# Patient Record
Sex: FEMALE | Race: WHITE | NOT HISPANIC OR LATINO
[De-identification: names, ages, dates, MRNs, and addresses within clinical notes are randomized per-mention and may not be internally consistent; named-entity substitution may affect disease eponyms.]

---

## 2021-07-12 ENCOUNTER — APPOINTMENT (OUTPATIENT)
Dept: UROGYNECOLOGY | Facility: CLINIC | Age: 56
End: 2021-07-12

## 2022-11-17 ENCOUNTER — OUTPATIENT (OUTPATIENT)
Dept: OUTPATIENT SERVICES | Facility: HOSPITAL | Age: 57
LOS: 1 days | End: 2022-11-17
Payer: MEDICARE

## 2022-11-17 VITALS
OXYGEN SATURATION: 100 % | TEMPERATURE: 98 F | DIASTOLIC BLOOD PRESSURE: 67 MMHG | WEIGHT: 229.06 LBS | RESPIRATION RATE: 16 BRPM | HEIGHT: 64 IN | HEART RATE: 83 BPM | SYSTOLIC BLOOD PRESSURE: 125 MMHG

## 2022-11-17 DIAGNOSIS — Z29.9 ENCOUNTER FOR PROPHYLACTIC MEASURES, UNSPECIFIED: ICD-10-CM

## 2022-11-17 DIAGNOSIS — Z01.818 ENCOUNTER FOR OTHER PREPROCEDURAL EXAMINATION: ICD-10-CM

## 2022-11-17 DIAGNOSIS — M47.896 OTHER SPONDYLOSIS, LUMBAR REGION: ICD-10-CM

## 2022-11-17 DIAGNOSIS — Z98.890 OTHER SPECIFIED POSTPROCEDURAL STATES: Chronic | ICD-10-CM

## 2022-11-17 DIAGNOSIS — Z90.710 ACQUIRED ABSENCE OF BOTH CERVIX AND UTERUS: Chronic | ICD-10-CM

## 2022-11-17 DIAGNOSIS — Z96.652 PRESENCE OF LEFT ARTIFICIAL KNEE JOINT: Chronic | ICD-10-CM

## 2022-11-17 DIAGNOSIS — Z90.49 ACQUIRED ABSENCE OF OTHER SPECIFIED PARTS OF DIGESTIVE TRACT: Chronic | ICD-10-CM

## 2022-11-17 LAB
A1C WITH ESTIMATED AVERAGE GLUCOSE RESULT: 5.9 % — HIGH (ref 4–5.6)
ANION GAP SERPL CALC-SCNC: 3 MMOL/L — LOW (ref 5–17)
APPEARANCE UR: CLEAR — SIGNIFICANT CHANGE UP
APTT BLD: 28.9 SEC — SIGNIFICANT CHANGE UP (ref 27.5–35.5)
BASOPHILS # BLD AUTO: 0.03 K/UL — SIGNIFICANT CHANGE UP (ref 0–0.2)
BASOPHILS NFR BLD AUTO: 0.5 % — SIGNIFICANT CHANGE UP (ref 0–2)
BILIRUB UR-MCNC: NEGATIVE — SIGNIFICANT CHANGE UP
BUN SERPL-MCNC: 19 MG/DL — SIGNIFICANT CHANGE UP (ref 7–23)
CALCIUM SERPL-MCNC: 9.7 MG/DL — SIGNIFICANT CHANGE UP (ref 8.5–10.1)
CHLORIDE SERPL-SCNC: 105 MMOL/L — SIGNIFICANT CHANGE UP (ref 96–108)
CO2 SERPL-SCNC: 29 MMOL/L — SIGNIFICANT CHANGE UP (ref 22–31)
COLOR SPEC: YELLOW — SIGNIFICANT CHANGE UP
CREAT SERPL-MCNC: 0.47 MG/DL — LOW (ref 0.5–1.3)
DIFF PNL FLD: NEGATIVE — SIGNIFICANT CHANGE UP
EGFR: 110 ML/MIN/1.73M2 — SIGNIFICANT CHANGE UP
EOSINOPHIL # BLD AUTO: 0.3 K/UL — SIGNIFICANT CHANGE UP (ref 0–0.5)
EOSINOPHIL NFR BLD AUTO: 4.8 % — SIGNIFICANT CHANGE UP (ref 0–6)
ESTIMATED AVERAGE GLUCOSE: 123 MG/DL — HIGH (ref 68–114)
GLUCOSE SERPL-MCNC: 103 MG/DL — HIGH (ref 70–99)
GLUCOSE UR QL: NEGATIVE — SIGNIFICANT CHANGE UP
HCT VFR BLD CALC: 42.2 % — SIGNIFICANT CHANGE UP (ref 34.5–45)
HGB BLD-MCNC: 14 G/DL — SIGNIFICANT CHANGE UP (ref 11.5–15.5)
IMM GRANULOCYTES NFR BLD AUTO: 0.2 % — SIGNIFICANT CHANGE UP (ref 0–0.9)
INR BLD: 0.95 RATIO — SIGNIFICANT CHANGE UP (ref 0.88–1.16)
KETONES UR-MCNC: NEGATIVE — SIGNIFICANT CHANGE UP
LEUKOCYTE ESTERASE UR-ACNC: ABNORMAL
LYMPHOCYTES # BLD AUTO: 1.82 K/UL — SIGNIFICANT CHANGE UP (ref 1–3.3)
LYMPHOCYTES # BLD AUTO: 29 % — SIGNIFICANT CHANGE UP (ref 13–44)
MCHC RBC-ENTMCNC: 28.6 PG — SIGNIFICANT CHANGE UP (ref 27–34)
MCHC RBC-ENTMCNC: 33.2 GM/DL — SIGNIFICANT CHANGE UP (ref 32–36)
MCV RBC AUTO: 86.3 FL — SIGNIFICANT CHANGE UP (ref 80–100)
MONOCYTES # BLD AUTO: 0.64 K/UL — SIGNIFICANT CHANGE UP (ref 0–0.9)
MONOCYTES NFR BLD AUTO: 10.2 % — SIGNIFICANT CHANGE UP (ref 2–14)
MRSA PCR RESULT.: DETECTED
NEUTROPHILS # BLD AUTO: 3.48 K/UL — SIGNIFICANT CHANGE UP (ref 1.8–7.4)
NEUTROPHILS NFR BLD AUTO: 55.3 % — SIGNIFICANT CHANGE UP (ref 43–77)
NITRITE UR-MCNC: NEGATIVE — SIGNIFICANT CHANGE UP
PH UR: 5 — SIGNIFICANT CHANGE UP (ref 5–8)
PLATELET # BLD AUTO: 204 K/UL — SIGNIFICANT CHANGE UP (ref 150–400)
POTASSIUM SERPL-MCNC: 4.2 MMOL/L — SIGNIFICANT CHANGE UP (ref 3.5–5.3)
POTASSIUM SERPL-SCNC: 4.2 MMOL/L — SIGNIFICANT CHANGE UP (ref 3.5–5.3)
PROT UR-MCNC: NEGATIVE — SIGNIFICANT CHANGE UP
PROTHROM AB SERPL-ACNC: 11 SEC — SIGNIFICANT CHANGE UP (ref 10.5–13.4)
RBC # BLD: 4.89 M/UL — SIGNIFICANT CHANGE UP (ref 3.8–5.2)
RBC # FLD: 13.2 % — SIGNIFICANT CHANGE UP (ref 10.3–14.5)
S AUREUS DNA NOSE QL NAA+PROBE: DETECTED
SODIUM SERPL-SCNC: 137 MMOL/L — SIGNIFICANT CHANGE UP (ref 135–145)
SP GR SPEC: 1.02 — SIGNIFICANT CHANGE UP (ref 1.01–1.02)
UROBILINOGEN FLD QL: NEGATIVE — SIGNIFICANT CHANGE UP
WBC # BLD: 6.28 K/UL — SIGNIFICANT CHANGE UP (ref 3.8–10.5)
WBC # FLD AUTO: 6.28 K/UL — SIGNIFICANT CHANGE UP (ref 3.8–10.5)

## 2022-11-17 PROCEDURE — 87086 URINE CULTURE/COLONY COUNT: CPT

## 2022-11-17 PROCEDURE — 71046 X-RAY EXAM CHEST 2 VIEWS: CPT

## 2022-11-17 PROCEDURE — 87640 STAPH A DNA AMP PROBE: CPT

## 2022-11-17 PROCEDURE — 86901 BLOOD TYPING SEROLOGIC RH(D): CPT

## 2022-11-17 PROCEDURE — 71046 X-RAY EXAM CHEST 2 VIEWS: CPT | Mod: 26

## 2022-11-17 PROCEDURE — 80048 BASIC METABOLIC PNL TOTAL CA: CPT

## 2022-11-17 PROCEDURE — 85025 COMPLETE CBC W/AUTO DIFF WBC: CPT

## 2022-11-17 PROCEDURE — 99214 OFFICE O/P EST MOD 30 MIN: CPT | Mod: 25

## 2022-11-17 PROCEDURE — 85730 THROMBOPLASTIN TIME PARTIAL: CPT

## 2022-11-17 PROCEDURE — 87641 MR-STAPH DNA AMP PROBE: CPT

## 2022-11-17 PROCEDURE — 86900 BLOOD TYPING SEROLOGIC ABO: CPT

## 2022-11-17 PROCEDURE — 93005 ELECTROCARDIOGRAM TRACING: CPT

## 2022-11-17 PROCEDURE — 86850 RBC ANTIBODY SCREEN: CPT

## 2022-11-17 PROCEDURE — 93010 ELECTROCARDIOGRAM REPORT: CPT

## 2022-11-17 PROCEDURE — 81001 URINALYSIS AUTO W/SCOPE: CPT

## 2022-11-17 PROCEDURE — 36415 COLL VENOUS BLD VENIPUNCTURE: CPT

## 2022-11-17 PROCEDURE — 83036 HEMOGLOBIN GLYCOSYLATED A1C: CPT

## 2022-11-17 PROCEDURE — 85610 PROTHROMBIN TIME: CPT

## 2022-11-17 NOTE — H&P PST ADULT - NS PRO LAST MENSTRUAL DATE
Prior to injection verified pt identity using pt name and date of birth.    Allergy injection/s given and charted on paper allergy flow sheet.  Patient left AMA, signing form, not staying for the observation period.    Morgan Buchanan RN on 4/6/2022 at 11:22 AM        
Hysterectomy at 33 years old

## 2022-11-17 NOTE — H&P PST ADULT - HISTORY OF PRESENT ILLNESS
57 years old female with spinal stenosis and spondylolisthesis. Reports constant aching and at times sharp pain to mid lumbar spine radiating to bilateral feet. Numbness and tingling to bilateral legs. Reports pain for "a few years " which has gotten  worst in the last year.  Four falls since Summer 2020. Last fell 11/16/2020 with trauma to left palm of hand resulting in 12 sutures to site. Falls due to weakness in left lower extremity. Denies changes in bowel or urinary habits. Pain medications for pain. Planned bilateral laminectomy, fusion and instrumentation L2-5.

## 2022-11-17 NOTE — H&P PST ADULT - ALLERGIC/IMMUNOLOGIC
Reason for call:  Other   Patient called regarding (reason for call): appointment   Additional comments: extra time after physical     Phone number to reach patient:  Home number on file [phone] (home)    Best Time:  Any time    Can we leave a detailed message on this number?  YES    
S-(situation): I received a message from scheduling asking if pt needs more time for her physical this  Monday?  Pt intends to establish care with Dr Langley.    I spoke with pt.    She needs to establish care with primary care provider.  Also, she has history of endometrial cancer in 2007 and was treated with radiation.  Pt had a follow up visit in 2008 with Dr Melara in oncology.  She has not had any health care since.  Pt reports that she walks with a cane due to bilateral hip pain and that her legs are getting weaker.  She has bilateral leg swelling, presumed lymph drainage, ever since her surgery.    She complains of ongoing urinary incontinence.       B-(background): per above.    A-(assessment): upcoming appt to establish care.    R-(recommendations): Advised to arrive fasting for her 7:20 appt on Monday.  Appt is appropriately scheduled.  Orders and follow up recommendations will be determined at the visit.    Zenaida Biggs RN      
negative

## 2022-11-17 NOTE — H&P PST ADULT - NSICDXPASTSURGICALHX_GEN_ALL_CORE_FT
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 Replaced after 9 months    History of cholecystectomy 2018    History of hysterectomy at 33 years old    Status post creation of urethral sling by suprapubic approach at 33 years old

## 2022-11-17 NOTE — H&P PST ADULT - ASSESSMENT
57 years old female present to PST prior to bilateral laminectomies, fusion , instrumentation L2-5, bone graft, allograft, BNP with Dr. Schaefer.    Plan   1. NPO as per ASU  2. Take the following medications with sips of water on the day of procedure: Lisinopril, Amlodipine, Fluoxetine, may take Clonazepam  3. Use E-Z sponge as directed  4. Use Mupirocin as directed.  5. Drink a quart of extra  fluids the day before your surgery.  6 Medical optimization for surgery with Dr. Rosales and cardiac evaluation with Dr. Nation  7. CBC, CMP, PT/ INR and PTT, Urinalysis, Urine culture, Type and Screen, HGB AIC, MRSA sent to lab  8. EKG and Chest x- ray done in [facility]  9. Stop Meloxicam 7 days prior to surgery  10. To schedule covid swab    JOSEI SCORE [CLOT]    AGE RELATED RISK FACTORS                                                       MOBILITY RELATED FACTORS  [x ] Age 41-60 years                                            (1 Point)                  [ ] Bed rest                                                        (1 Point)  [ ] Age: 61-74 years                                           (2 Points)                 [ ] Plaster cast                                                   (2 Points)  [ ] Age= 75 years                                              (3 Points)                 [ ] Bed bound for more than 72 hours                 (2 Points)    DISEASE RELATED RISK FACTORS                                               GENDER SPECIFIC FACTORS  [x ] Edema in the lower extremities                       (1 Point)                  [ ] Pregnancy                                                     (1 Point)  [ ] Varicose veins                                               (1 Point)                  [ ] Post-partum < 6 weeks                                   (1 Point)             [x ] BMI > 25 Kg/m2                                            (1 Point)                  [ ] Hormonal therapy  or oral contraception          (1 Point)                 [ ] Sepsis (in the previous month)                        (1 Point)                  [ ] History of pregnancy complications                 (1 point)  [ ] Pneumonia or serious lung disease                                               [ ] Unexplained or recurrent                     (1 Point)           (in the previous month)                               (1 Point)  [ ] Abnormal pulmonary function test                     (1 Point)                 SURGERY RELATED RISK FACTORS  [ ] Acute myocardial infarction                              (1 Point)                 [ ]  Section                                             (1 Point)  [ ] Congestive heart failure (in the previous month)  (1 Point)               [ ] Minor surgery                                                  (1 Point)   [ ] Inflammatory bowel disease                             (1 Point)                 [ ] Arthroscopic surgery                                        (2 Points)  [ ] Central venous access                                      (2 Points)                [ x] General surgery lasting more than 45 minutes   (2 Points)       [ ] Stroke (in the previous month)                          (5 Points)               [ ] Elective arthroplasty                                         (5 Points)            [ ] malignancy present or previous                      (2 points)                                                                                                                                   HEMATOLOGY RELATED FACTORS                                                 TRAUMA RELATED RISK FACTORS  [ ] Prior episodes of VTE                                     (3 Points)                 [ ] Fracture of the hip, pelvis, or leg                       (5 Points)  [ ] Positive family history for VTE                         (3 Points)                 [ ] Acute spinal cord injury (in the previous month)  (5 Points)  [ ] Prothrombin 91312 A                                     (3 Points)                 [ ] Paralysis  (less than 1 month)                             (5 Points)  [ ] Factor V Leiden                                             (3 Points)                  [ ] Multiple Trauma within 1 month                        (5 Points)  [ ] Lupus anticoagulants                                     (3 Points)                                                           [ ] Anticardiolipin antibodies                               (3 Points)                                                       [ ] High homocysteine in the blood                      (3 Points)                                             [ ] Other congenital or acquired thrombophilia      (3 Points)                                                [ ] Heparin induced thrombocytopenia                  (3 Points)                                          Total Score [      5    ]

## 2022-11-17 NOTE — H&P PST ADULT - NSICDXPASTMEDICALHX_GEN_ALL_CORE_FT
PAST MEDICAL HISTORY:  2019 novel coronavirus disease (COVID-19) 1/ 2022    Anxiety and depression     Asthma     Eczema     HTN (hypertension)     Joint pain Knees. left knee replaced.    Lower back pain with radiating pain to bilatral lower extremities    Lumbar stenosis     Obesity     Osteoarthritis of both knees     Peripheral edema     Prolapse of female pelvic organs     Rheumatoid arthritis     Seasonal allergies     Spondylolisthesis Lumbar

## 2022-11-17 NOTE — H&P PST ADULT - NSICDXFAMILYHX_GEN_ALL_CORE_FT
FAMILY HISTORY:  Father  Still living? No  Family history of bone cancer, Age at diagnosis: Age Unknown  Family history of osteoarthritis, Age at diagnosis: Age Unknown  FHx: diabetes mellitus, Age at diagnosis: Age Unknown    Mother  Still living? No  Family history of dementia, Age at diagnosis: Age Unknown

## 2022-11-18 DIAGNOSIS — M47.896 OTHER SPONDYLOSIS, LUMBAR REGION: ICD-10-CM

## 2022-11-18 DIAGNOSIS — Z01.818 ENCOUNTER FOR OTHER PREPROCEDURAL EXAMINATION: ICD-10-CM

## 2022-11-18 LAB
CULTURE RESULTS: NO GROWTH — SIGNIFICANT CHANGE UP
SPECIMEN SOURCE: SIGNIFICANT CHANGE UP

## 2022-12-01 RX ORDER — TRANEXAMIC ACID 100 MG/ML
1000 INJECTION, SOLUTION INTRAVENOUS ONCE
Refills: 0 | Status: DISCONTINUED | OUTPATIENT
Start: 2022-12-02 | End: 2022-12-07

## 2022-12-01 RX ORDER — TRANEXAMIC ACID 100 MG/ML
1 INJECTION, SOLUTION INTRAVENOUS
Qty: 1000 | Refills: 0 | Status: DISCONTINUED | OUTPATIENT
Start: 2022-12-02 | End: 2022-12-07

## 2022-12-02 ENCOUNTER — INPATIENT (INPATIENT)
Facility: HOSPITAL | Age: 57
LOS: 4 days | Discharge: HOME | DRG: 460 | End: 2022-12-07
Attending: ORTHOPAEDIC SURGERY | Admitting: ORTHOPAEDIC SURGERY
Payer: MEDICARE

## 2022-12-02 VITALS
SYSTOLIC BLOOD PRESSURE: 118 MMHG | RESPIRATION RATE: 16 BRPM | DIASTOLIC BLOOD PRESSURE: 79 MMHG | WEIGHT: 229.28 LBS | TEMPERATURE: 98 F | HEIGHT: 64 IN | HEART RATE: 80 BPM | OXYGEN SATURATION: 100 %

## 2022-12-02 DIAGNOSIS — Z90.710 ACQUIRED ABSENCE OF BOTH CERVIX AND UTERUS: Chronic | ICD-10-CM

## 2022-12-02 DIAGNOSIS — Z98.890 OTHER SPECIFIED POSTPROCEDURAL STATES: Chronic | ICD-10-CM

## 2022-12-02 DIAGNOSIS — Z90.49 ACQUIRED ABSENCE OF OTHER SPECIFIED PARTS OF DIGESTIVE TRACT: Chronic | ICD-10-CM

## 2022-12-02 DIAGNOSIS — M47.896 OTHER SPONDYLOSIS, LUMBAR REGION: ICD-10-CM

## 2022-12-02 DIAGNOSIS — Z96.652 PRESENCE OF LEFT ARTIFICIAL KNEE JOINT: Chronic | ICD-10-CM

## 2022-12-02 PROCEDURE — 87040 BLOOD CULTURE FOR BACTERIA: CPT

## 2022-12-02 PROCEDURE — C1889: CPT

## 2022-12-02 PROCEDURE — 97116 GAIT TRAINING THERAPY: CPT | Mod: GP

## 2022-12-02 PROCEDURE — 36415 COLL VENOUS BLD VENIPUNCTURE: CPT

## 2022-12-02 PROCEDURE — 97163 PT EVAL HIGH COMPLEX 45 MIN: CPT | Mod: GP

## 2022-12-02 PROCEDURE — 71045 X-RAY EXAM CHEST 1 VIEW: CPT

## 2022-12-02 PROCEDURE — C9399: CPT

## 2022-12-02 PROCEDURE — C1713: CPT

## 2022-12-02 PROCEDURE — 97530 THERAPEUTIC ACTIVITIES: CPT | Mod: GP

## 2022-12-02 PROCEDURE — 82962 GLUCOSE BLOOD TEST: CPT

## 2022-12-02 PROCEDURE — 76000 FLUOROSCOPY <1 HR PHYS/QHP: CPT

## 2022-12-02 PROCEDURE — 94640 AIRWAY INHALATION TREATMENT: CPT

## 2022-12-02 PROCEDURE — 80048 BASIC METABOLIC PNL TOTAL CA: CPT

## 2022-12-02 PROCEDURE — 85025 COMPLETE CBC W/AUTO DIFF WBC: CPT

## 2022-12-02 PROCEDURE — 81001 URINALYSIS AUTO W/SCOPE: CPT

## 2022-12-02 PROCEDURE — 85027 COMPLETE CBC AUTOMATED: CPT

## 2022-12-02 PROCEDURE — 86891 AUTOLOGOUS BLOOD OP SALVAGE: CPT

## 2022-12-02 RX ORDER — HYDROMORPHONE HYDROCHLORIDE 2 MG/ML
0.5 INJECTION INTRAMUSCULAR; INTRAVENOUS; SUBCUTANEOUS
Refills: 0 | Status: DISCONTINUED | OUTPATIENT
Start: 2022-12-02 | End: 2022-12-04

## 2022-12-02 RX ORDER — HYDROMORPHONE HYDROCHLORIDE 2 MG/ML
30 INJECTION INTRAMUSCULAR; INTRAVENOUS; SUBCUTANEOUS
Refills: 0 | Status: DISCONTINUED | OUTPATIENT
Start: 2022-12-02 | End: 2022-12-04

## 2022-12-02 RX ORDER — ACETAMINOPHEN 500 MG
650 TABLET ORAL EVERY 6 HOURS
Refills: 0 | Status: DISCONTINUED | OUTPATIENT
Start: 2022-12-02 | End: 2022-12-07

## 2022-12-02 RX ORDER — CEFAZOLIN SODIUM 1 G
2000 VIAL (EA) INJECTION EVERY 8 HOURS
Refills: 0 | Status: COMPLETED | OUTPATIENT
Start: 2022-12-02 | End: 2022-12-03

## 2022-12-02 RX ORDER — SODIUM CHLORIDE 9 MG/ML
1000 INJECTION, SOLUTION INTRAVENOUS
Refills: 0 | Status: DISCONTINUED | OUTPATIENT
Start: 2022-12-02 | End: 2022-12-02

## 2022-12-02 RX ORDER — OXYCODONE HYDROCHLORIDE 5 MG/1
10 TABLET ORAL
Refills: 0 | Status: COMPLETED | OUTPATIENT
Start: 2022-12-02 | End: 2022-12-09

## 2022-12-02 RX ORDER — SENNA PLUS 8.6 MG/1
2 TABLET ORAL AT BEDTIME
Refills: 0 | Status: DISCONTINUED | OUTPATIENT
Start: 2022-12-02 | End: 2022-12-07

## 2022-12-02 RX ORDER — FLUOXETINE HCL 10 MG
40 CAPSULE ORAL DAILY
Refills: 0 | Status: DISCONTINUED | OUTPATIENT
Start: 2022-12-02 | End: 2022-12-07

## 2022-12-02 RX ORDER — CYCLOBENZAPRINE HYDROCHLORIDE 10 MG/1
10 TABLET, FILM COATED ORAL EVERY 8 HOURS
Refills: 0 | Status: DISCONTINUED | OUTPATIENT
Start: 2022-12-02 | End: 2022-12-07

## 2022-12-02 RX ORDER — ALBUTEROL 90 UG/1
2 AEROSOL, METERED ORAL EVERY 6 HOURS
Refills: 0 | Status: DISCONTINUED | OUTPATIENT
Start: 2022-12-02 | End: 2022-12-07

## 2022-12-02 RX ORDER — CLONAZEPAM 1 MG
1 TABLET ORAL THREE TIMES A DAY
Refills: 0 | Status: DISCONTINUED | OUTPATIENT
Start: 2022-12-02 | End: 2022-12-07

## 2022-12-02 RX ORDER — INFLUENZA VIRUS VACCINE 15; 15; 15; 15 UG/.5ML; UG/.5ML; UG/.5ML; UG/.5ML
0.5 SUSPENSION INTRAMUSCULAR ONCE
Refills: 0 | Status: DISCONTINUED | OUTPATIENT
Start: 2022-12-02 | End: 2022-12-07

## 2022-12-02 RX ORDER — MONTELUKAST 4 MG/1
10 TABLET, CHEWABLE ORAL AT BEDTIME
Refills: 0 | Status: DISCONTINUED | OUTPATIENT
Start: 2022-12-02 | End: 2022-12-07

## 2022-12-02 RX ORDER — LISINOPRIL 2.5 MG/1
10 TABLET ORAL DAILY
Refills: 0 | Status: DISCONTINUED | OUTPATIENT
Start: 2022-12-02 | End: 2022-12-07

## 2022-12-02 RX ORDER — SODIUM CHLORIDE 9 MG/ML
1000 INJECTION, SOLUTION INTRAVENOUS
Refills: 0 | Status: DISCONTINUED | OUTPATIENT
Start: 2022-12-02 | End: 2022-12-07

## 2022-12-02 RX ORDER — AMLODIPINE BESYLATE 2.5 MG/1
10 TABLET ORAL DAILY
Refills: 0 | Status: DISCONTINUED | OUTPATIENT
Start: 2022-12-02 | End: 2022-12-07

## 2022-12-02 RX ORDER — GABAPENTIN 400 MG/1
600 CAPSULE ORAL THREE TIMES A DAY
Refills: 0 | Status: DISCONTINUED | OUTPATIENT
Start: 2022-12-02 | End: 2022-12-07

## 2022-12-02 RX ORDER — NALOXONE HYDROCHLORIDE 4 MG/.1ML
0.1 SPRAY NASAL
Refills: 0 | Status: DISCONTINUED | OUTPATIENT
Start: 2022-12-02 | End: 2022-12-04

## 2022-12-02 RX ORDER — DIAZEPAM 5 MG
5 TABLET ORAL EVERY 12 HOURS
Refills: 0 | Status: DISCONTINUED | OUTPATIENT
Start: 2022-12-02 | End: 2022-12-07

## 2022-12-02 RX ORDER — ONDANSETRON 8 MG/1
4 TABLET, FILM COATED ORAL EVERY 6 HOURS
Refills: 0 | Status: DISCONTINUED | OUTPATIENT
Start: 2022-12-02 | End: 2022-12-07

## 2022-12-02 RX ORDER — MORPHINE SULFATE 50 MG/1
4 CAPSULE, EXTENDED RELEASE ORAL
Refills: 0 | Status: COMPLETED | OUTPATIENT
Start: 2022-12-02 | End: 2022-12-09

## 2022-12-02 RX ORDER — CEPHALEXIN 500 MG
1 CAPSULE ORAL
Qty: 0 | Refills: 0 | DISCHARGE

## 2022-12-02 RX ORDER — ONDANSETRON 8 MG/1
4 TABLET, FILM COATED ORAL EVERY 6 HOURS
Refills: 0 | Status: DISCONTINUED | OUTPATIENT
Start: 2022-12-02 | End: 2022-12-04

## 2022-12-02 RX ADMIN — HYDROMORPHONE HYDROCHLORIDE 0.5 MILLIGRAM(S): 2 INJECTION INTRAMUSCULAR; INTRAVENOUS; SUBCUTANEOUS at 16:10

## 2022-12-02 RX ADMIN — GABAPENTIN 600 MILLIGRAM(S): 400 CAPSULE ORAL at 22:42

## 2022-12-02 RX ADMIN — Medication 100 MILLIGRAM(S): at 19:36

## 2022-12-02 RX ADMIN — HYDROMORPHONE HYDROCHLORIDE 30 MILLILITER(S): 2 INJECTION INTRAMUSCULAR; INTRAVENOUS; SUBCUTANEOUS at 15:02

## 2022-12-02 RX ADMIN — ONDANSETRON 4 MILLIGRAM(S): 8 TABLET, FILM COATED ORAL at 15:03

## 2022-12-02 RX ADMIN — CYCLOBENZAPRINE HYDROCHLORIDE 10 MILLIGRAM(S): 10 TABLET, FILM COATED ORAL at 22:42

## 2022-12-02 RX ADMIN — MONTELUKAST 10 MILLIGRAM(S): 4 TABLET, CHEWABLE ORAL at 23:37

## 2022-12-02 NOTE — BRIEF OPERATIVE NOTE - NSICDXBRIEFPOSTOP_GEN_ALL_CORE_FT
POST-OP DIAGNOSIS:  Lumbar canal stenosis 02-Dec-2022 14:46:26  Vasyl Schaefer  Spondylolisthesis, lumbar region 02-Dec-2022 14:47:05  Vasyl Schaefer

## 2022-12-02 NOTE — PATIENT PROFILE ADULT - FALL HARM RISK - RISK INTERVENTIONS

## 2022-12-02 NOTE — BRIEF OPERATIVE NOTE - NSICDXBRIEFPREOP_GEN_ALL_CORE_FT
PRE-OP DIAGNOSIS:  Lumbar canal stenosis 02-Dec-2022 14:45:54  Vasyl Schaefer  Spondylolisthesis, lumbar region 02-Dec-2022 14:46:10  Vasyl Schaefer

## 2022-12-03 LAB
ANION GAP SERPL CALC-SCNC: 7 MMOL/L — SIGNIFICANT CHANGE UP (ref 5–17)
BASOPHILS # BLD AUTO: 0.02 K/UL — SIGNIFICANT CHANGE UP (ref 0–0.2)
BASOPHILS NFR BLD AUTO: 0.1 % — SIGNIFICANT CHANGE UP (ref 0–2)
BUN SERPL-MCNC: 14 MG/DL — SIGNIFICANT CHANGE UP (ref 7–23)
CALCIUM SERPL-MCNC: 8.6 MG/DL — SIGNIFICANT CHANGE UP (ref 8.5–10.1)
CHLORIDE SERPL-SCNC: 108 MMOL/L — SIGNIFICANT CHANGE UP (ref 96–108)
CO2 SERPL-SCNC: 24 MMOL/L — SIGNIFICANT CHANGE UP (ref 22–31)
CREAT SERPL-MCNC: 0.58 MG/DL — SIGNIFICANT CHANGE UP (ref 0.5–1.3)
EGFR: 104 ML/MIN/1.73M2 — SIGNIFICANT CHANGE UP
EOSINOPHIL # BLD AUTO: 0 K/UL — SIGNIFICANT CHANGE UP (ref 0–0.5)
EOSINOPHIL NFR BLD AUTO: 0 % — SIGNIFICANT CHANGE UP (ref 0–6)
GLUCOSE SERPL-MCNC: 146 MG/DL — HIGH (ref 70–99)
HCT VFR BLD CALC: 34.2 % — LOW (ref 34.5–45)
HGB BLD-MCNC: 11 G/DL — LOW (ref 11.5–15.5)
IMM GRANULOCYTES NFR BLD AUTO: 0.6 % — SIGNIFICANT CHANGE UP (ref 0–0.9)
LYMPHOCYTES # BLD AUTO: 1.48 K/UL — SIGNIFICANT CHANGE UP (ref 1–3.3)
LYMPHOCYTES # BLD AUTO: 10.4 % — LOW (ref 13–44)
MCHC RBC-ENTMCNC: 28.2 PG — SIGNIFICANT CHANGE UP (ref 27–34)
MCHC RBC-ENTMCNC: 32.2 GM/DL — SIGNIFICANT CHANGE UP (ref 32–36)
MCV RBC AUTO: 87.7 FL — SIGNIFICANT CHANGE UP (ref 80–100)
MONOCYTES # BLD AUTO: 1.28 K/UL — HIGH (ref 0–0.9)
MONOCYTES NFR BLD AUTO: 9 % — SIGNIFICANT CHANGE UP (ref 2–14)
NEUTROPHILS # BLD AUTO: 11.31 K/UL — HIGH (ref 1.8–7.4)
NEUTROPHILS NFR BLD AUTO: 79.9 % — HIGH (ref 43–77)
PLATELET # BLD AUTO: 223 K/UL — SIGNIFICANT CHANGE UP (ref 150–400)
POTASSIUM SERPL-MCNC: 4.2 MMOL/L — SIGNIFICANT CHANGE UP (ref 3.5–5.3)
POTASSIUM SERPL-SCNC: 4.2 MMOL/L — SIGNIFICANT CHANGE UP (ref 3.5–5.3)
RBC # BLD: 3.9 M/UL — SIGNIFICANT CHANGE UP (ref 3.8–5.2)
RBC # FLD: 13.9 % — SIGNIFICANT CHANGE UP (ref 10.3–14.5)
SODIUM SERPL-SCNC: 139 MMOL/L — SIGNIFICANT CHANGE UP (ref 135–145)
WBC # BLD: 14.18 K/UL — HIGH (ref 3.8–10.5)
WBC # FLD AUTO: 14.18 K/UL — HIGH (ref 3.8–10.5)

## 2022-12-03 PROCEDURE — 99222 1ST HOSP IP/OBS MODERATE 55: CPT

## 2022-12-03 RX ADMIN — Medication 100 MILLIGRAM(S): at 02:40

## 2022-12-03 RX ADMIN — CYCLOBENZAPRINE HYDROCHLORIDE 10 MILLIGRAM(S): 10 TABLET, FILM COATED ORAL at 14:03

## 2022-12-03 RX ADMIN — CYCLOBENZAPRINE HYDROCHLORIDE 10 MILLIGRAM(S): 10 TABLET, FILM COATED ORAL at 05:24

## 2022-12-03 RX ADMIN — Medication 40 MILLIGRAM(S): at 11:02

## 2022-12-03 RX ADMIN — HYDROMORPHONE HYDROCHLORIDE 30 MILLILITER(S): 2 INJECTION INTRAMUSCULAR; INTRAVENOUS; SUBCUTANEOUS at 23:03

## 2022-12-03 RX ADMIN — SODIUM CHLORIDE 100 MILLILITER(S): 9 INJECTION, SOLUTION INTRAVENOUS at 02:43

## 2022-12-03 RX ADMIN — LISINOPRIL 10 MILLIGRAM(S): 2.5 TABLET ORAL at 11:02

## 2022-12-03 RX ADMIN — CYCLOBENZAPRINE HYDROCHLORIDE 10 MILLIGRAM(S): 10 TABLET, FILM COATED ORAL at 21:26

## 2022-12-03 RX ADMIN — MONTELUKAST 10 MILLIGRAM(S): 4 TABLET, CHEWABLE ORAL at 21:26

## 2022-12-03 RX ADMIN — GABAPENTIN 600 MILLIGRAM(S): 400 CAPSULE ORAL at 14:03

## 2022-12-03 RX ADMIN — GABAPENTIN 600 MILLIGRAM(S): 400 CAPSULE ORAL at 05:24

## 2022-12-03 RX ADMIN — GABAPENTIN 600 MILLIGRAM(S): 400 CAPSULE ORAL at 21:25

## 2022-12-03 RX ADMIN — SENNA PLUS 2 TABLET(S): 8.6 TABLET ORAL at 21:25

## 2022-12-03 NOTE — PHYSICAL THERAPY INITIAL EVALUATION ADULT - ADDITIONAL COMMENTS
Pt. lives with her boy-friend and her adult kids, Pt. has ramp to enter home and no steps inside. Pt. was using rollator or cane as needed.

## 2022-12-03 NOTE — PHYSICAL THERAPY INITIAL EVALUATION ADULT - GENERAL OBSERVATIONS, REHAB EVAL
Pt. received supine in bed + IV + O2 nc + one drain + PCA + medina agreeable to PT. Bed mob logroll with SBA/CG A, sit to stand to RW with CG A amb 5 ft with RW SBA. Pt. received supine in bed + IV + O2 nc + one drain + PCA + medina agreeable to PT. Bed mob logroll with SBA/CG A, sit to stand to RW with CG A amb 75 ft with RW SBA.

## 2022-12-03 NOTE — CONSULT NOTE ADULT - SUBJECTIVE AND OBJECTIVE BOX
CC-     HPI:      Review of system- All 10 systems reviewed and is as per HPI otherwise negative.       T(C): 36.4 (12-03-22 @ 09:04), Max: 37.2 (12-03-22 @ 00:50)  HR: 71 (12-03-22 @ 09:04) (65 - 91)  BP: 105/60 (12-03-22 @ 09:04) (99/57 - 117/72)  RR: 18 (12-03-22 @ 09:04) (13 - 19)  SpO2: 96% (12-03-22 @ 09:04) (96% - 100%)  Wt(kg): --    LABS:                        11.0   14.18 )-----------( 223      ( 03 Dec 2022 06:33 )             34.2     12-03    139  |  108  |  14  ----------------------------<  146<H>  4.2   |  24  |  0.58    Ca    8.6      03 Dec 2022 06:33    CAPILLARY BLOOD GLUCOSE  POCT Blood Glucose.: 155 mg/dL (03 Dec 2022 06:24)  POCT Blood Glucose.: 151 mg/dL (02 Dec 2022 15:02)    RADIOLOGY & ADDITIONAL TESTS:      PHYSICAL EXAM:  GENERAL: NAD, well-groomed, well-developed  HEAD:  Atraumatic, Normocephalic  EYES: EOMI, PERRLA, conjunctiva and sclera clear  HEENT: Moist mucous membranes  NECK: Supple, No JVD  NERVOUS SYSTEM:  Alert & Oriented X3, Motor Strength 5/5 B/L upper and lower extremities; DTRs 2+ intact and symmetric  CHEST/LUNG: Clear to auscultation bilaterally; No rales, rhonchi, wheezing, or rubs  HEART: Regular rate and rhythm; No murmurs, rubs, or gallops  ABDOMEN: Soft, Nontender, Nondistended; Bowel sounds present  GENITOURINARY- Voiding, no palpable bladder  EXTREMITIES:  2+ Peripheral Pulses, No clubbing, cyanosis, or edema  MUSCULOSKELTAL- No muscle tenderness, Muscle tone normal, No joint tenderness, no Joint swelling, Joint range of motion-normal  SKIN-no rash, no lesion  CNS- alert, oriented X3, non focal       acetaminophen     Tablet .. 650 milliGRAM(s) Oral every 6 hours PRN  albuterol    90 MICROgram(s) HFA Inhaler 2 Puff(s) Inhalation every 6 hours PRN  amLODIPine   Tablet 10 milliGRAM(s) Oral daily  clonazePAM  Tablet 1 milliGRAM(s) Oral three times a day PRN  cyclobenzaprine 10 milliGRAM(s) Oral every 8 hours  diazepam    Tablet 5 milliGRAM(s) Oral every 12 hours PRN  FLUoxetine 40 milliGRAM(s) Oral daily  gabapentin 600 milliGRAM(s) Oral three times a day  HYDROmorphone PCA (1 mG/mL) 30 milliLiter(s) PCA Continuous PCA Continuous  HYDROmorphone PCA (1 mG/mL) Rescue Clinician Bolus 0.5 milliGRAM(s) IV Push every 15 minutes PRN  influenza   Vaccine 0.5 milliLiter(s) IntraMuscular once  lactated ringers. 1000 milliLiter(s) IV Continuous <Continuous>  lisinopril 10 milliGRAM(s) Oral daily  montelukast 10 milliGRAM(s) Oral at bedtime  morphine  - Injectable 4 milliGRAM(s) IV Push every 3 hours PRN  naloxone Injectable 0.1 milliGRAM(s) IV Push every 3 minutes PRN  ondansetron Injectable 4 milliGRAM(s) IV Push every 6 hours PRN  ondansetron Injectable 4 milliGRAM(s) IV Push every 6 hours PRN  oxyCODONE    IR 10 milliGRAM(s) Oral every 3 hours PRN  senna 2 Tablet(s) Oral at bedtime  tranexamic acid Infusion 1 mG/kG/Hr IV Continuous <Continuous>  tranexamic acid IVPB 1000 milliGRAM(s) IV Intermittent once    Assessment/Plan       CC- s/p L2-5 lumbar laminectomy/PSF    HPI:  56yo/F with PMH HTN, obesity, rheumatoid arthritis, lumbar spinal stenosis presented for elective lumbar laminectomy/ spinal fusion. Hospitalist consult called for postop medical management    PMH- as above  PSH- LT TKR, hysterectomy, cholecystectomy  Soc hx- denies smoking, alcohol socially  Fam hx- f bone ca, diabetes, m dementia    12/3/22- using PCA for pain. Frances in. BETITO- 415cc/24h    Review of system- All 10 systems reviewed and is as per HPI otherwise negative.     T(C): 36.4 (12-03-22 @ 09:04), Max: 37.2 (12-03-22 @ 00:50)  HR: 71 (12-03-22 @ 09:04) (65 - 91)  BP: 105/60 (12-03-22 @ 09:04) (99/57 - 117/72)  RR: 18 (12-03-22 @ 09:04) (13 - 19)  SpO2: 96% (12-03-22 @ 09:04) (96% - 100%)  Wt(kg): --    LABS:                        11.0   14.18 )-----------( 223      ( 03 Dec 2022 06:33 )             34.2     12-03    139  |  108  |  14  ----------------------------<  146<H>  4.2   |  24  |  0.58    Ca    8.6      03 Dec 2022 06:33    CAPILLARY BLOOD GLUCOSE  POCT Blood Glucose.: 155 mg/dL (03 Dec 2022 06:24)  POCT Blood Glucose.: 151 mg/dL (02 Dec 2022 15:02)    RADIOLOGY & ADDITIONAL TESTS:      PHYSICAL EXAM:  GENERAL: NAD, well-groomed, well-developed  HEAD:  Atraumatic, Normocephalic  EYES: EOMI, PERRLA, conjunctiva and sclera clear  HEENT: Moist mucous membranes  NECK: Supple, No JVD  NERVOUS SYSTEM:  Alert & Oriented X3, Motor Strength 5/5 B/L upper and lower extremities; DTRs 2+ intact and symmetric  CHEST/LUNG: Clear to auscultation bilaterally; No rales, rhonchi, wheezing, or rubs  HEART: Regular rate and rhythm; No murmurs, rubs, or gallops  ABDOMEN: Soft, Nontender, Nondistended; Bowel sounds present  GENITOURINARY- Daniels+  EXTREMITIES:  2+ Peripheral Pulses, No clubbing, cyanosis, or edema  MUSCULOSKELTAL- dressing dry, BETITO+  SKIN-no rash, no lesion  CNS- alert, oriented X3, non focal     MEDICATIONS  (STANDING):  amLODIPine   Tablet 10 milliGRAM(s) Oral daily  cyclobenzaprine 10 milliGRAM(s) Oral every 8 hours  FLUoxetine 40 milliGRAM(s) Oral daily  gabapentin 600 milliGRAM(s) Oral three times a day  HYDROmorphone PCA (1 mG/mL) 30 milliLiter(s) PCA Continuous PCA Continuous  influenza   Vaccine 0.5 milliLiter(s) IntraMuscular once  lactated ringers. 1000 milliLiter(s) (100 mL/Hr) IV Continuous <Continuous>  lisinopril 10 milliGRAM(s) Oral daily  montelukast 10 milliGRAM(s) Oral at bedtime  senna 2 Tablet(s) Oral at bedtime  tranexamic acid Infusion 1 mG/kG/Hr (52.8 mL/Hr) IV Continuous <Continuous>  tranexamic acid IVPB 1000 milliGRAM(s) IV Intermittent once    MEDICATIONS  (PRN):  acetaminophen     Tablet .. 650 milliGRAM(s) Oral every 6 hours PRN Temp greater or equal to 38.5C (101.3F), Mild Pain (1 - 3)  albuterol    90 MICROgram(s) HFA Inhaler 2 Puff(s) Inhalation every 6 hours PRN for shortness of breath and/or wheezing  clonazePAM  Tablet 1 milliGRAM(s) Oral three times a day PRN for anxiety  diazepam    Tablet 5 milliGRAM(s) Oral every 12 hours PRN muscle spasm  HYDROmorphone PCA (1 mG/mL) Rescue Clinician Bolus 0.5 milliGRAM(s) IV Push every 15 minutes PRN for Pain Scale GREATER THAN 6  morphine  - Injectable 4 milliGRAM(s) IV Push every 3 hours PRN Severe Pain (7 - 10)  naloxone Injectable 0.1 milliGRAM(s) IV Push every 3 minutes PRN For ANY of the following changes in patient status:  A. RR LESS THAN 10 breaths per minute, B. Oxygen saturation LESS THAN 90%, C. Sedation score of 6  ondansetron Injectable 4 milliGRAM(s) IV Push every 6 hours PRN Nausea  ondansetron Injectable 4 milliGRAM(s) IV Push every 6 hours PRN Nausea and/or Vomiting  oxyCODONE    IR 10 milliGRAM(s) Oral every 3 hours PRN Moderate Pain (4 - 6)      Assessment/Plan  #Lumbar spinal stenosis s/p L2-5 lumbar lami/PSF  Spine team following  Pain meds vis PCA  Daniels per spine  Incentive spirometry  OOb to ambulate  Monitor HH postop  Muscle relaxants, neurontin  Monitor BETITO drain output    #HTN  Norvasc    #DVT proph- venodynes    #Dispo- thank you for consult, will follow with you

## 2022-12-03 NOTE — PHYSICAL THERAPY INITIAL EVALUATION ADULT - PERTINENT HX OF CURRENT PROBLEM, REHAB EVAL
Pt. is 56 yo female s/p Posterior laminectomy of lumbar spine Pt. is 58 yo female s/p Posterior laminectomy of lumbar spine L3-S1.

## 2022-12-04 PROCEDURE — 99232 SBSQ HOSP IP/OBS MODERATE 35: CPT

## 2022-12-04 RX ORDER — MORPHINE SULFATE 50 MG/1
4 CAPSULE, EXTENDED RELEASE ORAL
Refills: 0 | Status: DISCONTINUED | OUTPATIENT
Start: 2022-12-04 | End: 2022-12-07

## 2022-12-04 RX ORDER — OXYCODONE HYDROCHLORIDE 5 MG/1
5 TABLET ORAL EVERY 4 HOURS
Refills: 0 | Status: DISCONTINUED | OUTPATIENT
Start: 2022-12-04 | End: 2022-12-07

## 2022-12-04 RX ORDER — OXYCODONE HYDROCHLORIDE 5 MG/1
10 TABLET ORAL
Refills: 0 | Status: DISCONTINUED | OUTPATIENT
Start: 2022-12-04 | End: 2022-12-07

## 2022-12-04 RX ADMIN — MORPHINE SULFATE 4 MILLIGRAM(S): 50 CAPSULE, EXTENDED RELEASE ORAL at 18:10

## 2022-12-04 RX ADMIN — CYCLOBENZAPRINE HYDROCHLORIDE 10 MILLIGRAM(S): 10 TABLET, FILM COATED ORAL at 15:29

## 2022-12-04 RX ADMIN — SODIUM CHLORIDE 100 MILLILITER(S): 9 INJECTION, SOLUTION INTRAVENOUS at 05:02

## 2022-12-04 RX ADMIN — CYCLOBENZAPRINE HYDROCHLORIDE 10 MILLIGRAM(S): 10 TABLET, FILM COATED ORAL at 21:24

## 2022-12-04 RX ADMIN — Medication 650 MILLIGRAM(S): at 19:35

## 2022-12-04 RX ADMIN — Medication 30 MILLILITER(S): at 20:15

## 2022-12-04 RX ADMIN — GABAPENTIN 600 MILLIGRAM(S): 400 CAPSULE ORAL at 05:02

## 2022-12-04 RX ADMIN — GABAPENTIN 600 MILLIGRAM(S): 400 CAPSULE ORAL at 15:29

## 2022-12-04 RX ADMIN — OXYCODONE HYDROCHLORIDE 10 MILLIGRAM(S): 5 TABLET ORAL at 17:28

## 2022-12-04 RX ADMIN — LISINOPRIL 10 MILLIGRAM(S): 2.5 TABLET ORAL at 10:33

## 2022-12-04 RX ADMIN — OXYCODONE HYDROCHLORIDE 10 MILLIGRAM(S): 5 TABLET ORAL at 16:58

## 2022-12-04 RX ADMIN — Medication 650 MILLIGRAM(S): at 20:20

## 2022-12-04 RX ADMIN — Medication 40 MILLIGRAM(S): at 10:28

## 2022-12-04 RX ADMIN — SENNA PLUS 2 TABLET(S): 8.6 TABLET ORAL at 21:25

## 2022-12-04 RX ADMIN — CYCLOBENZAPRINE HYDROCHLORIDE 10 MILLIGRAM(S): 10 TABLET, FILM COATED ORAL at 05:02

## 2022-12-04 RX ADMIN — GABAPENTIN 600 MILLIGRAM(S): 400 CAPSULE ORAL at 21:24

## 2022-12-05 LAB
ANION GAP SERPL CALC-SCNC: -1 MMOL/L — LOW (ref 5–17)
APPEARANCE UR: CLEAR — SIGNIFICANT CHANGE UP
BILIRUB UR-MCNC: NEGATIVE — SIGNIFICANT CHANGE UP
BUN SERPL-MCNC: 21 MG/DL — SIGNIFICANT CHANGE UP (ref 7–23)
CALCIUM SERPL-MCNC: 9.1 MG/DL — SIGNIFICANT CHANGE UP (ref 8.5–10.1)
CHLORIDE SERPL-SCNC: 105 MMOL/L — SIGNIFICANT CHANGE UP (ref 96–108)
CO2 SERPL-SCNC: 32 MMOL/L — HIGH (ref 22–31)
COLOR SPEC: YELLOW — SIGNIFICANT CHANGE UP
CREAT SERPL-MCNC: 0.49 MG/DL — LOW (ref 0.5–1.3)
DIFF PNL FLD: NEGATIVE — SIGNIFICANT CHANGE UP
EGFR: 108 ML/MIN/1.73M2 — SIGNIFICANT CHANGE UP
GLUCOSE SERPL-MCNC: 99 MG/DL — SIGNIFICANT CHANGE UP (ref 70–99)
GLUCOSE UR QL: NEGATIVE — SIGNIFICANT CHANGE UP
HCT VFR BLD CALC: 33.3 % — LOW (ref 34.5–45)
HGB BLD-MCNC: 10.4 G/DL — LOW (ref 11.5–15.5)
KETONES UR-MCNC: NEGATIVE — SIGNIFICANT CHANGE UP
LEUKOCYTE ESTERASE UR-ACNC: ABNORMAL
MCHC RBC-ENTMCNC: 28.2 PG — SIGNIFICANT CHANGE UP (ref 27–34)
MCHC RBC-ENTMCNC: 31.2 GM/DL — LOW (ref 32–36)
MCV RBC AUTO: 90.2 FL — SIGNIFICANT CHANGE UP (ref 80–100)
NITRITE UR-MCNC: NEGATIVE — SIGNIFICANT CHANGE UP
PH UR: 5 — SIGNIFICANT CHANGE UP (ref 5–8)
PLATELET # BLD AUTO: 203 K/UL — SIGNIFICANT CHANGE UP (ref 150–400)
POTASSIUM SERPL-MCNC: 5 MMOL/L — SIGNIFICANT CHANGE UP (ref 3.5–5.3)
POTASSIUM SERPL-SCNC: 5 MMOL/L — SIGNIFICANT CHANGE UP (ref 3.5–5.3)
PROT UR-MCNC: NEGATIVE — SIGNIFICANT CHANGE UP
RBC # BLD: 3.69 M/UL — LOW (ref 3.8–5.2)
RBC # FLD: 14.1 % — SIGNIFICANT CHANGE UP (ref 10.3–14.5)
SODIUM SERPL-SCNC: 136 MMOL/L — SIGNIFICANT CHANGE UP (ref 135–145)
SP GR SPEC: 1.01 — SIGNIFICANT CHANGE UP (ref 1.01–1.02)
UROBILINOGEN FLD QL: NEGATIVE — SIGNIFICANT CHANGE UP
WBC # BLD: 11.16 K/UL — HIGH (ref 3.8–10.5)
WBC # FLD AUTO: 11.16 K/UL — HIGH (ref 3.8–10.5)

## 2022-12-05 PROCEDURE — 99233 SBSQ HOSP IP/OBS HIGH 50: CPT

## 2022-12-05 RX ORDER — DIPHENHYDRAMINE HCL 50 MG
25 CAPSULE ORAL EVERY 6 HOURS
Refills: 0 | Status: DISCONTINUED | OUTPATIENT
Start: 2022-12-05 | End: 2022-12-07

## 2022-12-05 RX ADMIN — OXYCODONE HYDROCHLORIDE 10 MILLIGRAM(S): 5 TABLET ORAL at 22:35

## 2022-12-05 RX ADMIN — MORPHINE SULFATE 4 MILLIGRAM(S): 50 CAPSULE, EXTENDED RELEASE ORAL at 10:29

## 2022-12-05 RX ADMIN — OXYCODONE HYDROCHLORIDE 10 MILLIGRAM(S): 5 TABLET ORAL at 02:55

## 2022-12-05 RX ADMIN — MORPHINE SULFATE 4 MILLIGRAM(S): 50 CAPSULE, EXTENDED RELEASE ORAL at 06:52

## 2022-12-05 RX ADMIN — CYCLOBENZAPRINE HYDROCHLORIDE 10 MILLIGRAM(S): 10 TABLET, FILM COATED ORAL at 05:24

## 2022-12-05 RX ADMIN — OXYCODONE HYDROCHLORIDE 10 MILLIGRAM(S): 5 TABLET ORAL at 08:23

## 2022-12-05 RX ADMIN — OXYCODONE HYDROCHLORIDE 10 MILLIGRAM(S): 5 TABLET ORAL at 07:53

## 2022-12-05 RX ADMIN — OXYCODONE HYDROCHLORIDE 10 MILLIGRAM(S): 5 TABLET ORAL at 11:47

## 2022-12-05 RX ADMIN — MONTELUKAST 10 MILLIGRAM(S): 4 TABLET, CHEWABLE ORAL at 21:21

## 2022-12-05 RX ADMIN — MORPHINE SULFATE 4 MILLIGRAM(S): 50 CAPSULE, EXTENDED RELEASE ORAL at 10:14

## 2022-12-05 RX ADMIN — SODIUM CHLORIDE 100 MILLILITER(S): 9 INJECTION, SOLUTION INTRAVENOUS at 05:24

## 2022-12-05 RX ADMIN — OXYCODONE HYDROCHLORIDE 10 MILLIGRAM(S): 5 TABLET ORAL at 02:17

## 2022-12-05 RX ADMIN — OXYCODONE HYDROCHLORIDE 10 MILLIGRAM(S): 5 TABLET ORAL at 12:17

## 2022-12-05 RX ADMIN — MORPHINE SULFATE 4 MILLIGRAM(S): 50 CAPSULE, EXTENDED RELEASE ORAL at 07:07

## 2022-12-05 RX ADMIN — Medication 650 MILLIGRAM(S): at 21:25

## 2022-12-05 RX ADMIN — MORPHINE SULFATE 4 MILLIGRAM(S): 50 CAPSULE, EXTENDED RELEASE ORAL at 00:39

## 2022-12-05 RX ADMIN — OXYCODONE HYDROCHLORIDE 10 MILLIGRAM(S): 5 TABLET ORAL at 18:43

## 2022-12-05 RX ADMIN — GABAPENTIN 600 MILLIGRAM(S): 400 CAPSULE ORAL at 05:24

## 2022-12-05 RX ADMIN — GABAPENTIN 600 MILLIGRAM(S): 400 CAPSULE ORAL at 22:06

## 2022-12-05 RX ADMIN — OXYCODONE HYDROCHLORIDE 10 MILLIGRAM(S): 5 TABLET ORAL at 15:05

## 2022-12-05 RX ADMIN — Medication 40 MILLIGRAM(S): at 10:14

## 2022-12-05 RX ADMIN — OXYCODONE HYDROCHLORIDE 10 MILLIGRAM(S): 5 TABLET ORAL at 22:02

## 2022-12-05 RX ADMIN — OXYCODONE HYDROCHLORIDE 10 MILLIGRAM(S): 5 TABLET ORAL at 19:15

## 2022-12-05 RX ADMIN — Medication 1 APPLICATION(S): at 21:19

## 2022-12-05 RX ADMIN — CYCLOBENZAPRINE HYDROCHLORIDE 10 MILLIGRAM(S): 10 TABLET, FILM COATED ORAL at 21:21

## 2022-12-05 RX ADMIN — Medication 650 MILLIGRAM(S): at 21:20

## 2022-12-05 RX ADMIN — MORPHINE SULFATE 4 MILLIGRAM(S): 50 CAPSULE, EXTENDED RELEASE ORAL at 00:24

## 2022-12-05 RX ADMIN — OXYCODONE HYDROCHLORIDE 10 MILLIGRAM(S): 5 TABLET ORAL at 15:35

## 2022-12-05 RX ADMIN — GABAPENTIN 600 MILLIGRAM(S): 400 CAPSULE ORAL at 13:46

## 2022-12-05 RX ADMIN — Medication 25 MILLIGRAM(S): at 17:10

## 2022-12-05 NOTE — PROGRESS NOTE ADULT - ASSESSMENT
Patient presents with increasing L leg pain and back pain. Studies revealed marked spinal stenosis L 3/4 and L2/3.L4/5. Patient failed nonoperative modalities and underwent lumbar lami L2-5, PSF L2-5 with SSS/LBG/BMP on 12/2/22.    POD#1  Maintain PCA  Monitor BETITO  Increase activity with PT-may ambulate without brace  Monitor labs  D/C medina-monitor voiding
Patient presents with increasing L leg pain and back pain. Studies revealed marked spinal stenosis L 3/4 and L2/3.L4/5. Patient failed nonoperative modalities and underwent lumbar lami L2-5, PSF L2-5 with SSS/LBG/BMP on 12/2/22.    POD#3  Cont SA analgesia  Monitor BETITO  Increase activity with PT  Monitor labs  Monitor fever  
Patient presents with increasing L leg pain and back pain. Studies revealed marked spinal stenosis L 3/4 and L2/3.L4/5. Patient failed nonoperative modalities and underwent lumbar lami L2-5, PSF L2-5 with SSS/LBG/BMP on 12/2/22.    POD#2  D/C PCA-start SA analgesia  Monitor BETITO  Increase activity with PT  Monitor labs

## 2022-12-06 LAB
HCT VFR BLD CALC: 35.6 % — SIGNIFICANT CHANGE UP (ref 34.5–45)
HGB BLD-MCNC: 11.2 G/DL — LOW (ref 11.5–15.5)
MCHC RBC-ENTMCNC: 27.9 PG — SIGNIFICANT CHANGE UP (ref 27–34)
MCHC RBC-ENTMCNC: 31.5 GM/DL — LOW (ref 32–36)
MCV RBC AUTO: 88.8 FL — SIGNIFICANT CHANGE UP (ref 80–100)
PLATELET # BLD AUTO: 205 K/UL — SIGNIFICANT CHANGE UP (ref 150–400)
RBC # BLD: 4.01 M/UL — SIGNIFICANT CHANGE UP (ref 3.8–5.2)
RBC # FLD: 13.7 % — SIGNIFICANT CHANGE UP (ref 10.3–14.5)
WBC # BLD: 9.8 K/UL — SIGNIFICANT CHANGE UP (ref 3.8–10.5)
WBC # FLD AUTO: 9.8 K/UL — SIGNIFICANT CHANGE UP (ref 3.8–10.5)

## 2022-12-06 PROCEDURE — 99233 SBSQ HOSP IP/OBS HIGH 50: CPT

## 2022-12-06 PROCEDURE — 71045 X-RAY EXAM CHEST 1 VIEW: CPT | Mod: 26

## 2022-12-06 RX ORDER — BUDESONIDE AND FORMOTEROL FUMARATE DIHYDRATE 160; 4.5 UG/1; UG/1
2 AEROSOL RESPIRATORY (INHALATION)
Refills: 0 | Status: DISCONTINUED | OUTPATIENT
Start: 2022-12-06 | End: 2022-12-07

## 2022-12-06 RX ORDER — TIOTROPIUM BROMIDE 18 UG/1
2 CAPSULE ORAL; RESPIRATORY (INHALATION) DAILY
Refills: 0 | Status: DISCONTINUED | OUTPATIENT
Start: 2022-12-06 | End: 2022-12-07

## 2022-12-06 RX ADMIN — MONTELUKAST 10 MILLIGRAM(S): 4 TABLET, CHEWABLE ORAL at 21:26

## 2022-12-06 RX ADMIN — CYCLOBENZAPRINE HYDROCHLORIDE 10 MILLIGRAM(S): 10 TABLET, FILM COATED ORAL at 21:26

## 2022-12-06 RX ADMIN — CYCLOBENZAPRINE HYDROCHLORIDE 10 MILLIGRAM(S): 10 TABLET, FILM COATED ORAL at 06:12

## 2022-12-06 RX ADMIN — Medication 650 MILLIGRAM(S): at 04:10

## 2022-12-06 RX ADMIN — OXYCODONE HYDROCHLORIDE 10 MILLIGRAM(S): 5 TABLET ORAL at 06:00

## 2022-12-06 RX ADMIN — GABAPENTIN 600 MILLIGRAM(S): 400 CAPSULE ORAL at 21:26

## 2022-12-06 RX ADMIN — OXYCODONE HYDROCHLORIDE 10 MILLIGRAM(S): 5 TABLET ORAL at 09:15

## 2022-12-06 RX ADMIN — BUDESONIDE AND FORMOTEROL FUMARATE DIHYDRATE 2 PUFF(S): 160; 4.5 AEROSOL RESPIRATORY (INHALATION) at 21:10

## 2022-12-06 RX ADMIN — CYCLOBENZAPRINE HYDROCHLORIDE 10 MILLIGRAM(S): 10 TABLET, FILM COATED ORAL at 13:11

## 2022-12-06 RX ADMIN — OXYCODONE HYDROCHLORIDE 10 MILLIGRAM(S): 5 TABLET ORAL at 13:50

## 2022-12-06 RX ADMIN — GABAPENTIN 600 MILLIGRAM(S): 400 CAPSULE ORAL at 13:11

## 2022-12-06 RX ADMIN — LISINOPRIL 10 MILLIGRAM(S): 2.5 TABLET ORAL at 10:24

## 2022-12-06 RX ADMIN — Medication 650 MILLIGRAM(S): at 04:40

## 2022-12-06 RX ADMIN — Medication 650 MILLIGRAM(S): at 22:12

## 2022-12-06 RX ADMIN — OXYCODONE HYDROCHLORIDE 10 MILLIGRAM(S): 5 TABLET ORAL at 18:22

## 2022-12-06 RX ADMIN — OXYCODONE HYDROCHLORIDE 10 MILLIGRAM(S): 5 TABLET ORAL at 05:16

## 2022-12-06 RX ADMIN — Medication 5 MILLIGRAM(S): at 04:10

## 2022-12-06 RX ADMIN — GABAPENTIN 600 MILLIGRAM(S): 400 CAPSULE ORAL at 05:16

## 2022-12-06 RX ADMIN — OXYCODONE HYDROCHLORIDE 10 MILLIGRAM(S): 5 TABLET ORAL at 01:38

## 2022-12-06 RX ADMIN — Medication 1 APPLICATION(S): at 13:14

## 2022-12-06 RX ADMIN — Medication 5 MILLIGRAM(S): at 21:26

## 2022-12-06 RX ADMIN — AMLODIPINE BESYLATE 10 MILLIGRAM(S): 2.5 TABLET ORAL at 10:24

## 2022-12-06 RX ADMIN — OXYCODONE HYDROCHLORIDE 10 MILLIGRAM(S): 5 TABLET ORAL at 13:11

## 2022-12-06 RX ADMIN — OXYCODONE HYDROCHLORIDE 10 MILLIGRAM(S): 5 TABLET ORAL at 02:20

## 2022-12-06 RX ADMIN — Medication 40 MILLIGRAM(S): at 10:23

## 2022-12-06 RX ADMIN — OXYCODONE HYDROCHLORIDE 10 MILLIGRAM(S): 5 TABLET ORAL at 08:28

## 2022-12-06 RX ADMIN — OXYCODONE HYDROCHLORIDE 10 MILLIGRAM(S): 5 TABLET ORAL at 19:15

## 2022-12-06 RX ADMIN — Medication 1 MILLIGRAM(S): at 10:23

## 2022-12-06 RX ADMIN — Medication 650 MILLIGRAM(S): at 22:55

## 2022-12-06 NOTE — PROVIDER CONTACT NOTE (OTHER) - SITUATION
With patient movement in bed, BETITO drain dislodged from incision site. Device placed at the bedside. Drain site cleaned and dressed with gauze and tegaderm.
BETITO drain to left side back incision site no longer holding suction. 10ml of serosanguinous fluid emptied.

## 2022-12-06 NOTE — ADVANCED PRACTICE NURSE CONSULT - ASSESSMENT
This is a 57 year old female admitted on 12/2/2022 for Per brief operative note: Posterior Laminectomy of lumbar spine fusion of spine. PMHx: COVID-19, Eczema, obesity, joint pain, asthma, anxiety and depression, rheumatoid arthitis, peripheral edema, HTN, Spondylolisthesis, lumbar stenosis.     Consulted to assess Lumbar blistering around Tegaderm   Patient consented for me to assess.   Noted to patients lumbar with now seristrips in place and noted nonfluid filled s/p blisters. The blistering is outlining where the tegaderm was and can be classified as a tension blister. Maintain Silvadene to blistered area. May keep open to air because the skin is fragile. May place a nonstick telfa   Patient reported itching to skin, noted multiple excoriated area and educated patient to use caution when itching. Recommend Sween 24 Dimethicone to areas of itching to hydrate dry skin.

## 2022-12-07 ENCOUNTER — TRANSCRIPTION ENCOUNTER (OUTPATIENT)
Age: 57
End: 2022-12-07

## 2022-12-07 VITALS
HEART RATE: 86 BPM | TEMPERATURE: 100 F | SYSTOLIC BLOOD PRESSURE: 100 MMHG | RESPIRATION RATE: 18 BRPM | OXYGEN SATURATION: 94 % | DIASTOLIC BLOOD PRESSURE: 62 MMHG

## 2022-12-07 LAB
ANION GAP SERPL CALC-SCNC: 6 MMOL/L — SIGNIFICANT CHANGE UP (ref 5–17)
BUN SERPL-MCNC: 12 MG/DL — SIGNIFICANT CHANGE UP (ref 7–23)
CALCIUM SERPL-MCNC: 8.7 MG/DL — SIGNIFICANT CHANGE UP (ref 8.5–10.1)
CHLORIDE SERPL-SCNC: 103 MMOL/L — SIGNIFICANT CHANGE UP (ref 96–108)
CO2 SERPL-SCNC: 26 MMOL/L — SIGNIFICANT CHANGE UP (ref 22–31)
CREAT SERPL-MCNC: 0.48 MG/DL — LOW (ref 0.5–1.3)
EGFR: 109 ML/MIN/1.73M2 — SIGNIFICANT CHANGE UP
GLUCOSE SERPL-MCNC: 114 MG/DL — HIGH (ref 70–99)
HCT VFR BLD CALC: 30.7 % — LOW (ref 34.5–45)
HGB BLD-MCNC: 9.7 G/DL — LOW (ref 11.5–15.5)
MCHC RBC-ENTMCNC: 27.9 PG — SIGNIFICANT CHANGE UP (ref 27–34)
MCHC RBC-ENTMCNC: 31.6 GM/DL — LOW (ref 32–36)
MCV RBC AUTO: 88.2 FL — SIGNIFICANT CHANGE UP (ref 80–100)
PLATELET # BLD AUTO: 200 K/UL — SIGNIFICANT CHANGE UP (ref 150–400)
POTASSIUM SERPL-MCNC: 4.1 MMOL/L — SIGNIFICANT CHANGE UP (ref 3.5–5.3)
POTASSIUM SERPL-SCNC: 4.1 MMOL/L — SIGNIFICANT CHANGE UP (ref 3.5–5.3)
RBC # BLD: 3.48 M/UL — LOW (ref 3.8–5.2)
RBC # FLD: 13.9 % — SIGNIFICANT CHANGE UP (ref 10.3–14.5)
SODIUM SERPL-SCNC: 135 MMOL/L — SIGNIFICANT CHANGE UP (ref 135–145)
WBC # BLD: 8.31 K/UL — SIGNIFICANT CHANGE UP (ref 3.8–10.5)
WBC # FLD AUTO: 8.31 K/UL — SIGNIFICANT CHANGE UP (ref 3.8–10.5)

## 2022-12-07 PROCEDURE — 99232 SBSQ HOSP IP/OBS MODERATE 35: CPT

## 2022-12-07 RX ORDER — MELOXICAM 15 MG/1
1 TABLET ORAL
Qty: 0 | Refills: 0 | DISCHARGE

## 2022-12-07 RX ADMIN — OXYCODONE HYDROCHLORIDE 10 MILLIGRAM(S): 5 TABLET ORAL at 01:00

## 2022-12-07 RX ADMIN — TIOTROPIUM BROMIDE 2 PUFF(S): 18 CAPSULE ORAL; RESPIRATORY (INHALATION) at 09:07

## 2022-12-07 RX ADMIN — OXYCODONE HYDROCHLORIDE 10 MILLIGRAM(S): 5 TABLET ORAL at 10:41

## 2022-12-07 RX ADMIN — OXYCODONE HYDROCHLORIDE 10 MILLIGRAM(S): 5 TABLET ORAL at 11:11

## 2022-12-07 RX ADMIN — CYCLOBENZAPRINE HYDROCHLORIDE 10 MILLIGRAM(S): 10 TABLET, FILM COATED ORAL at 05:24

## 2022-12-07 RX ADMIN — OXYCODONE HYDROCHLORIDE 10 MILLIGRAM(S): 5 TABLET ORAL at 05:24

## 2022-12-07 RX ADMIN — Medication 1 APPLICATION(S): at 09:27

## 2022-12-07 RX ADMIN — BUDESONIDE AND FORMOTEROL FUMARATE DIHYDRATE 2 PUFF(S): 160; 4.5 AEROSOL RESPIRATORY (INHALATION) at 09:07

## 2022-12-07 RX ADMIN — Medication 5 MILLIGRAM(S): at 09:28

## 2022-12-07 RX ADMIN — OXYCODONE HYDROCHLORIDE 10 MILLIGRAM(S): 5 TABLET ORAL at 06:21

## 2022-12-07 RX ADMIN — OXYCODONE HYDROCHLORIDE 10 MILLIGRAM(S): 5 TABLET ORAL at 00:36

## 2022-12-07 RX ADMIN — Medication 40 MILLIGRAM(S): at 09:28

## 2022-12-07 RX ADMIN — GABAPENTIN 600 MILLIGRAM(S): 400 CAPSULE ORAL at 05:24

## 2022-12-07 RX ADMIN — Medication 1 MILLIGRAM(S): at 11:37

## 2022-12-07 NOTE — PROGRESS NOTE ADULT - REASON FOR ADMISSION
Back and L leg pain
Back and L leg pain
Posterior lumbar laminectomy/ fusion
Back and L leg pain
Posterior lumbar laminectomy/ fusion

## 2022-12-07 NOTE — DISCHARGE NOTE NURSING/CASE MANAGEMENT/SOCIAL WORK - NSDCPEFALRISK_GEN_ALL_CORE
For information on Fall & Injury Prevention, visit: [email or web address] OR  [email or web address] OR  https://www.cdc.gov/steadi/patient.html

## 2022-12-07 NOTE — DISCHARGE NOTE NURSING/CASE MANAGEMENT/SOCIAL WORK - PATIENT PORTAL LINK FT
You can access the FollowMyHealth Patient Portal offered by [facility] by registering at the following website: [email or web address]. By joining [employer]’s FollowMyHealth portal, you will also be able to view your health information using other applications (apps) compatible with our system.

## 2022-12-07 NOTE — PROGRESS NOTE ADULT - SUBJECTIVE AND OBJECTIVE BOX
CC- s/p L2-5 lumbar laminectomy/PSF    HPI:  58yo/F with PMH HTN, obesity, rheumatoid arthritis, lumbar spinal stenosis presented for elective lumbar laminectomy/ spinal fusion. Hospitalist consult called for postop medical management      12/3/22- using PCA for pain. Daniels in. BETITO- 415cc/24h  12/4/22- up and ambulating, brace on. Daniels removed and voiding. BETITO- 355cc/24h  12/5/22- Tmax last night 101.4, has some dry cough and mild dysuria. BETITO- 75cc/24h  12/6/22: seen at bedside, awake, alert, T max 100.4, non productive cough, + exp wheezes, no cp no sob, lynne po, no n/v    Review of system- All 10 systems reviewed and is as per HPI otherwise negative.     Vital Signs Last 24 Hrs  T(C): 37.1 (06 Dec 2022 09:05), Max: 38 (05 Dec 2022 20:12)  T(F): 98.7 (06 Dec 2022 09:05), Max: 100.4 (05 Dec 2022 20:12)  HR: 85 (06 Dec 2022 09:05) (74 - 94)  BP: 124/61 (06 Dec 2022 09:05) (111/53 - 124/61)  BP(mean): 75 (05 Dec 2022 20:12) (75 - 75)  RR: 18 (06 Dec 2022 09:05) (18 - 18)  SpO2: 100% (06 Dec 2022 09:05) (97% - 100%)    Parameters below as of 06 Dec 2022 09:05  Patient On (Oxygen Delivery Method): room air      LABS:                                                      11.2   9.80  )-----------( 205      ( 06 Dec 2022 08:35 )             35.6       12-05    136  |  105  |  21  ----------------------------<  99  5.0   |  32<H>  |  0.49<L>    Ca    9.1      05 Dec 2022 10:36          PHYSICAL EXAM:  GENERAL: NAD, well-groomed, well-developed  HEAD:  Atraumatic, Normocephalic  EYES: EOMI, PERRLA, conjunctiva and sclera clear  HEENT: Moist mucous membranes  NECK: Supple, No JVD  NERVOUS SYSTEM:  Alert & Oriented X3, Motor Strength 5/5 B/L upper and lower extremities; DTRs 2+ intact and symmetric  CHEST/LUNG: scattered rhonchi,  exp wheezes, non productive cough  HEART: Regular rate and rhythm; No murmurs, rubs, or gallops  ABDOMEN: Soft, Nontender, Nondistended; Bowel sounds present  GENITOURINARY- voiding  EXTREMITIES:  2+ Peripheral Pulses, No clubbing, cyanosis, or edema  MUSCULOSKELETAL- dressing dry, BETITO seelf dislodged  SKIN-blisters at tape sites surrounding the incision where dsg was removed  CNS- alert, oriented X3, non focal     MEDICATIONS  (STANDING):  amLODIPine   Tablet 10 milliGRAM(s) Oral daily  cyclobenzaprine 10 milliGRAM(s) Oral every 8 hours  FLUoxetine 40 milliGRAM(s) Oral daily  gabapentin 600 milliGRAM(s) Oral three times a day  HYDROmorphone PCA (1 mG/mL) 30 milliLiter(s) PCA Continuous PCA Continuous  influenza   Vaccine 0.5 milliLiter(s) IntraMuscular once  lactated ringers. 1000 milliLiter(s) (100 mL/Hr) IV Continuous <Continuous>  lisinopril 10 milliGRAM(s) Oral daily  montelukast 10 milliGRAM(s) Oral at bedtime  senna 2 Tablet(s) Oral at bedtime  tranexamic acid Infusion 1 mG/kG/Hr (52.8 mL/Hr) IV Continuous <Continuous>  tranexamic acid IVPB 1000 milliGRAM(s) IV Intermittent once    MEDICATIONS  (PRN):  acetaminophen     Tablet .. 650 milliGRAM(s) Oral every 6 hours PRN Temp greater or equal to 38.5C (101.3F), Mild Pain (1 - 3)  albuterol    90 MICROgram(s) HFA Inhaler 2 Puff(s) Inhalation every 6 hours PRN for shortness of breath and/or wheezing  clonazePAM  Tablet 1 milliGRAM(s) Oral three times a day PRN for anxiety  diazepam    Tablet 5 milliGRAM(s) Oral every 12 hours PRN muscle spasm  HYDROmorphone PCA (1 mG/mL) Rescue Clinician Bolus 0.5 milliGRAM(s) IV Push every 15 minutes PRN for Pain Scale GREATER THAN 6  morphine  - Injectable 4 milliGRAM(s) IV Push every 3 hours PRN Severe Pain (7 - 10)  naloxone Injectable 0.1 milliGRAM(s) IV Push every 3 minutes PRN For ANY of the following changes in patient status:  A. RR LESS THAN 10 breaths per minute, B. Oxygen saturation LESS THAN 90%, C. Sedation score of 6  ondansetron Injectable 4 milliGRAM(s) IV Push every 6 hours PRN Nausea  ondansetron Injectable 4 milliGRAM(s) IV Push every 6 hours PRN Nausea and/or Vomiting  oxyCODONE    IR 10 milliGRAM(s) Oral every 3 hours PRN Moderate Pain (4 - 6)      Assessment/Plan  #Lumbar spinal stenosis s/p L2-5 lumbar lami/PSF  Spine team following  Pain meds prn  Daniels removed- voiding  Incentive spirometry  OOb to ambulate, brace on  Monitor HH postop  Muscle relaxants, neurontin  Monitor BETITO drain output- 75cc    #Febrile episode  Patient is very concerned about possible MRSA as she had infections in the past  Will r/o other sources as well  UA negative  Will get blood c/s pending  Continue incentive spirometry- pt using it regularly  Monitor fever trends  No need for abx for now    # asthma exacerbation   cont albuterol   add budosenide/formeterol, spriva    #HTN  Norvasc    #DVT proph- venodynes    #Dispo- per spine    
CC- s/p L2-5 lumbar laminectomy/PSF    HPI:  58yo/F with PMH HTN, obesity, rheumatoid arthritis, lumbar spinal stenosis presented for elective lumbar laminectomy/ spinal fusion. Hospitalist consult called for postop medical management      12/3/22- using PCA for pain. Daniels in. BETITO- 415cc/24h  12/4/22- up and ambulating, brace on. Daniels removed and voiding. BETITO- 355cc/24h  12/5/22- Tmax last night 101.4, has some dry cough and mild dysuria. BETITO- 75cc/24h  12/6/22: seen at bedside, awake, alert, T max 100.4, non productive cough, + exp wheezes, no cp no sob, lynne po, no n/v  12/7/22- Tmax 100.6, wheezing better    Review of system- All 10 systems reviewed and is as per HPI otherwise negative.     Vital Signs Last 24 Hrs  T(C): 37.6 (07 Dec 2022 08:49), Max: 38.1 (06 Dec 2022 19:39)  T(F): 99.7 (07 Dec 2022 08:49), Max: 100.6 (06 Dec 2022 19:39)  HR: 74 (07 Dec 2022 09:12) (74 - 104)  BP: 100/62 (07 Dec 2022 08:49) (96/58 - 132/90)  BP(mean): --  RR: 18 (07 Dec 2022 08:49) (18 - 18)  SpO2: 94% (07 Dec 2022 08:49) (93% - 100%)    Parameters below as of 07 Dec 2022 08:49  Patient On (Oxygen Delivery Method): room air      LABS:                                9.7    8.31  )-----------( 200      ( 07 Dec 2022 07:04 )             30.7     07 Dec 2022 07:04    135    |  103    |  12     ----------------------------<  114    4.1     |  26     |  0.48     Ca    8.7        07 Dec 2022 07:04    PHYSICAL EXAM:  GENERAL: NAD, well-groomed, well-developed  HEAD:  Atraumatic, Normocephalic  EYES: EOMI, PERRLA, conjunctiva and sclera clear  HEENT: Moist mucous membranes  NECK: Supple, No JVD  NERVOUS SYSTEM:  Alert & Oriented X3, Motor Strength 5/5 B/L upper and lower extremities; DTRs 2+ intact and symmetric  CHEST/LUNG: scattered rhonchi,  exp wheezes, non productive cough  HEART: Regular rate and rhythm; No murmurs, rubs, or gallops  ABDOMEN: Soft, Nontender, Nondistended; Bowel sounds present  GENITOURINARY- voiding  EXTREMITIES:  2+ Peripheral Pulses, No clubbing, cyanosis, or edema  MUSCULOSKELETAL- dressing dry, BETITO self dislodged  SKIN-blisters at tape sites surrounding the incision where dsg was removed  CNS- alert, oriented X3, non focal     MEDICATIONS  (STANDING):  amLODIPine   Tablet 10 milliGRAM(s) Oral daily  cyclobenzaprine 10 milliGRAM(s) Oral every 8 hours  FLUoxetine 40 milliGRAM(s) Oral daily  gabapentin 600 milliGRAM(s) Oral three times a day  HYDROmorphone PCA (1 mG/mL) 30 milliLiter(s) PCA Continuous PCA Continuous  influenza   Vaccine 0.5 milliLiter(s) IntraMuscular once  lactated ringers. 1000 milliLiter(s) (100 mL/Hr) IV Continuous <Continuous>  lisinopril 10 milliGRAM(s) Oral daily  montelukast 10 milliGRAM(s) Oral at bedtime  senna 2 Tablet(s) Oral at bedtime  tranexamic acid Infusion 1 mG/kG/Hr (52.8 mL/Hr) IV Continuous <Continuous>  tranexamic acid IVPB 1000 milliGRAM(s) IV Intermittent once    MEDICATIONS  (PRN):  acetaminophen     Tablet .. 650 milliGRAM(s) Oral every 6 hours PRN Temp greater or equal to 38.5C (101.3F), Mild Pain (1 - 3)  albuterol    90 MICROgram(s) HFA Inhaler 2 Puff(s) Inhalation every 6 hours PRN for shortness of breath and/or wheezing  clonazePAM  Tablet 1 milliGRAM(s) Oral three times a day PRN for anxiety  diazepam    Tablet 5 milliGRAM(s) Oral every 12 hours PRN muscle spasm  HYDROmorphone PCA (1 mG/mL) Rescue Clinician Bolus 0.5 milliGRAM(s) IV Push every 15 minutes PRN for Pain Scale GREATER THAN 6  morphine  - Injectable 4 milliGRAM(s) IV Push every 3 hours PRN Severe Pain (7 - 10)  naloxone Injectable 0.1 milliGRAM(s) IV Push every 3 minutes PRN For ANY of the following changes in patient status:  A. RR LESS THAN 10 breaths per minute, B. Oxygen saturation LESS THAN 90%, C. Sedation score of 6  ondansetron Injectable 4 milliGRAM(s) IV Push every 6 hours PRN Nausea  ondansetron Injectable 4 milliGRAM(s) IV Push every 6 hours PRN Nausea and/or Vomiting  oxyCODONE    IR 10 milliGRAM(s) Oral every 3 hours PRN Moderate Pain (4 - 6)      Assessment/Plan  #Lumbar spinal stenosis s/p L2-5 lumbar lami/PSF  Spine team following  Pain meds prn  Daniels removed- voiding  Incentive spirometry  OOb to ambulate, brace on  Monitor HH postop  Muscle relaxants, neurontin    #Febrile episode  Patient is very concerned about possible MRSA as she had infections in the past- blood culture drawn on 12/5- no growth  UA negative  CXR done- my reading no infiltrate, official reading pending  Continue incentive spirometry- pt using it regularly  Monitor fever trends  No need for abx for now    # asthma exacerbation   cont albuterol   add budesonide/formeterol, spriva    #HTN  Norvasc    #DVT proph- venodynes    #Dispo- per spine, likely home today    
CC- s/p L2-5 lumbar laminectomy/PSF    HPI:  58yo/F with PMH HTN, obesity, rheumatoid arthritis, lumbar spinal stenosis presented for elective lumbar laminectomy/ spinal fusion. Hospitalist consult called for postop medical management    PMH- as above  PSH- LT TKR, hysterectomy, cholecystectomy  Soc hx- denies smoking, alcohol socially  Fam hx- f bone ca, diabetes, m dementia    12/3/22- using PCA for pain. Daniels in. BETITO- 415cc/24h  12/4/22- up and ambulating, brace on. Daniels removed and voiding. BETITO- 355cc/24h    Review of system- All 10 systems reviewed and is as per HPI otherwise negative.     Vital Signs Last 24 Hrs  T(C): 37 (04 Dec 2022 08:52), Max: 37 (04 Dec 2022 08:52)  T(F): 98.6 (04 Dec 2022 08:52), Max: 98.6 (04 Dec 2022 08:52)  HR: 79 (04 Dec 2022 08:52) (77 - 86)  BP: 100/50 (04 Dec 2022 08:52) (100/50 - 113/67)  BP(mean): 88 (03 Dec 2022 20:31) (88 - 88)  RR: 18 (04 Dec 2022 08:52) (18 - 18)  SpO2: 95% (04 Dec 2022 08:52) (94% - 95%)    Parameters below as of 04 Dec 2022 08:52  Patient On (Oxygen Delivery Method): room air    LABS:                                  11.0   14.18 )-----------( 223      ( 03 Dec 2022 06:33 )             34.2     03 Dec 2022 06:33    139    |  108    |  14     ----------------------------<  146    4.2     |  24     |  0.58     Ca    8.6        03 Dec 2022 06:33    CAPILLARY BLOOD GLUCOSE  POCT Blood Glucose.: 131 mg/dL (03 Dec 2022 21:28)    RADIOLOGY & ADDITIONAL TESTS:      PHYSICAL EXAM:  GENERAL: NAD, well-groomed, well-developed  HEAD:  Atraumatic, Normocephalic  EYES: EOMI, PERRLA, conjunctiva and sclera clear  HEENT: Moist mucous membranes  NECK: Supple, No JVD  NERVOUS SYSTEM:  Alert & Oriented X3, Motor Strength 5/5 B/L upper and lower extremities; DTRs 2+ intact and symmetric  CHEST/LUNG: Clear to auscultation bilaterally; No rales, rhonchi, wheezing, or rubs  HEART: Regular rate and rhythm; No murmurs, rubs, or gallops  ABDOMEN: Soft, Nontender, Nondistended; Bowel sounds present  GENITOURINARY- Daniels removed- voiding  EXTREMITIES:  2+ Peripheral Pulses, No clubbing, cyanosis, or edema  MUSCULOSKELTAL- dressing dry, BETITO+  SKIN-no rash, no lesion  CNS- alert, oriented X3, non focal     MEDICATIONS  (STANDING):  amLODIPine   Tablet 10 milliGRAM(s) Oral daily  cyclobenzaprine 10 milliGRAM(s) Oral every 8 hours  FLUoxetine 40 milliGRAM(s) Oral daily  gabapentin 600 milliGRAM(s) Oral three times a day  HYDROmorphone PCA (1 mG/mL) 30 milliLiter(s) PCA Continuous PCA Continuous  influenza   Vaccine 0.5 milliLiter(s) IntraMuscular once  lactated ringers. 1000 milliLiter(s) (100 mL/Hr) IV Continuous <Continuous>  lisinopril 10 milliGRAM(s) Oral daily  montelukast 10 milliGRAM(s) Oral at bedtime  senna 2 Tablet(s) Oral at bedtime  tranexamic acid Infusion 1 mG/kG/Hr (52.8 mL/Hr) IV Continuous <Continuous>  tranexamic acid IVPB 1000 milliGRAM(s) IV Intermittent once    MEDICATIONS  (PRN):  acetaminophen     Tablet .. 650 milliGRAM(s) Oral every 6 hours PRN Temp greater or equal to 38.5C (101.3F), Mild Pain (1 - 3)  albuterol    90 MICROgram(s) HFA Inhaler 2 Puff(s) Inhalation every 6 hours PRN for shortness of breath and/or wheezing  clonazePAM  Tablet 1 milliGRAM(s) Oral three times a day PRN for anxiety  diazepam    Tablet 5 milliGRAM(s) Oral every 12 hours PRN muscle spasm  HYDROmorphone PCA (1 mG/mL) Rescue Clinician Bolus 0.5 milliGRAM(s) IV Push every 15 minutes PRN for Pain Scale GREATER THAN 6  morphine  - Injectable 4 milliGRAM(s) IV Push every 3 hours PRN Severe Pain (7 - 10)  naloxone Injectable 0.1 milliGRAM(s) IV Push every 3 minutes PRN For ANY of the following changes in patient status:  A. RR LESS THAN 10 breaths per minute, B. Oxygen saturation LESS THAN 90%, C. Sedation score of 6  ondansetron Injectable 4 milliGRAM(s) IV Push every 6 hours PRN Nausea  ondansetron Injectable 4 milliGRAM(s) IV Push every 6 hours PRN Nausea and/or Vomiting  oxyCODONE    IR 10 milliGRAM(s) Oral every 3 hours PRN Moderate Pain (4 - 6)      Assessment/Plan  #Lumbar spinal stenosis s/p L2-5 lumbar lami/PSF  Spine team following  Pain meds vis PCA  Daniels removed- voiding  Incentive spirometry  OOb to ambulate, brace on  Monitor HH postop  Muscle relaxants, neurontin  Monitor BETITO drain output- 355cc    #HTN  Norvasc    #DVT proph- venodynes    #Dispo- per spine    
POST OPERATIVE DAY #:  4  STATUS POST:  Lami/fusion/instr L2-5                     SUBJECTIVE: Patient seen and examined. Doing well. Mobilizing slowly. Tolerating PO.    OBJECTIVE:     Vital Signs Last 24 Hrs  T(C): 37.1 (06 Dec 2022 09:05), Max: 38 (05 Dec 2022 20:12)  T(F): 98.7 (06 Dec 2022 09:05), Max: 100.4 (05 Dec 2022 20:12)  HR: 85 (06 Dec 2022 09:05) (74 - 94)  BP: 124/61 (06 Dec 2022 09:05) (111/53 - 124/61)  BP(mean): 75 (05 Dec 2022 20:12) (75 - 75)  RR: 18 (06 Dec 2022 09:05) (18 - 18)  SpO2: 100% (06 Dec 2022 09:05) (97% - 100%)    Parameters below as of 06 Dec 2022 09:05  Patient On (Oxygen Delivery Method): room air        Awake and alert  HEENT: unremarkable  Neck: supple  Back:          Incision: clean/dry/intact . Some tape blisters noted.         Drain dislodged during the evening.  Bilateral Upper Extremities:         Good Motor Strength         Sensation intact  Bilateral Lower Extremities:          Good Motor Strength          Sensation Intact                   I&O's Detail    05 Dec 2022 07:01  -  06 Dec 2022 07:00  --------------------------------------------------------  IN:  Total IN: 0 mL    OUT:    Bulb (mL): 75 mL  Total OUT: 75 mL    Total NET: -75 mL          LABS:                        11.2   9.80  )-----------( 205      ( 06 Dec 2022 08:35 )             35.6     12-05    136  |  105  |  21  ----------------------------<  99  5.0   |  32<H>  |  0.49<L>    Ca    9.1      05 Dec 2022 10:36        A/P :  57y Female s/p  Lami/fusion/instr L2-5           POD # 4  -    Pain control  -    DVT ppx: SCDs    -    OOB as tolerated  -    Physical Therapy, re; ambulation  -    Continue present treatment  
Patient presents with increasing L leg pain and back pain. Studies revealed marked spinal stenosis L 3/4 and L2/3.L4/5. Patient failed nonoperative modalities and underwent lumbar lami L2-5, PSF L2-5 with SSS/LBG/BMP on 12/2/22.    12/3/22 Patient does not have L leg pain today-even while ambulating    PAST MEDICAL & SURGICAL HISTORY:  Lumbar stenosis      Spondylolisthesis  Lumbar      HTN (hypertension)      Peripheral edema      Rheumatoid arthritis      Anxiety and depression      Asthma      Lower back pain  with radiating pain to bilatral lower extremities      Osteoarthritis of both knees      Joint pain  Knees. left knee replaced.      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 Replaced after 9 months      MEDICATIONS  (STANDING):  amLODIPine   Tablet 10 milliGRAM(s) Oral daily  cyclobenzaprine 10 milliGRAM(s) Oral every 8 hours  FLUoxetine 40 milliGRAM(s) Oral daily  gabapentin 600 milliGRAM(s) Oral three times a day  HYDROmorphone PCA (1 mG/mL) 30 milliLiter(s) PCA Continuous PCA Continuous  influenza   Vaccine 0.5 milliLiter(s) IntraMuscular once  lactated ringers. 1000 milliLiter(s) (100 mL/Hr) IV Continuous <Continuous>  lisinopril 10 milliGRAM(s) Oral daily  montelukast 10 milliGRAM(s) Oral at bedtime  senna 2 Tablet(s) Oral at bedtime  tranexamic acid Infusion 1 mG/kG/Hr (52.8 mL/Hr) IV Continuous <Continuous>  tranexamic acid IVPB 1000 milliGRAM(s) IV Intermittent once    MEDICATIONS  (PRN):  acetaminophen     Tablet .. 650 milliGRAM(s) Oral every 6 hours PRN Temp greater or equal to 38.5C (101.3F), Mild Pain (1 - 3)  albuterol    90 MICROgram(s) HFA Inhaler 2 Puff(s) Inhalation every 6 hours PRN for shortness of breath and/or wheezing  clonazePAM  Tablet 1 milliGRAM(s) Oral three times a day PRN for anxiety  diazepam    Tablet 5 milliGRAM(s) Oral every 12 hours PRN muscle spasm  HYDROmorphone PCA (1 mG/mL) Rescue Clinician Bolus 0.5 milliGRAM(s) IV Push every 15 minutes PRN for Pain Scale GREATER THAN 6  morphine  - Injectable 4 milliGRAM(s) IV Push every 3 hours PRN Severe Pain (7 - 10)  naloxone Injectable 0.1 milliGRAM(s) IV Push every 3 minutes PRN For ANY of the following changes in patient status:  A. RR LESS THAN 10 breaths per minute, B. Oxygen saturation LESS THAN 90%, C. Sedation score of 6  ondansetron Injectable 4 milliGRAM(s) IV Push every 6 hours PRN Nausea  ondansetron Injectable 4 milliGRAM(s) IV Push every 6 hours PRN Nausea and/or Vomiting  oxyCODONE    IR 10 milliGRAM(s) Oral every 3 hours PRN Moderate Pain (4 - 6)    Allergies    Levaquin (Hives; Flushing)    Intolerances    PE:    Vital Signs Last 24 Hrs  T(C): 36.4 (03 Dec 2022 09:04), Max: 37.2 (03 Dec 2022 00:50)  T(F): 97.5 (03 Dec 2022 09:04), Max: 99 (03 Dec 2022 00:50)  HR: 71 (03 Dec 2022 09:04) (65 - 91)  BP: 105/60 (03 Dec 2022 09:04) (99/57 - 117/72)  BP(mean): --  RR: 18 (03 Dec 2022 09:04) (13 - 19)  SpO2: 96% (03 Dec 2022 09:04) (96% - 100%)    Parameters below as of 03 Dec 2022 09:04  Patient On (Oxygen Delivery Method): nasal cannula    Patient sitting in bedside chair-ambulating with PT earlier to day  Tolerating diet  Daniels with good U/O  Dressing clean and dry  HP with moderate drainage  No motor deficits LEs    LABS                        11.0   14.18 )-----------( 223      ( 03 Dec 2022 06:33 )             34.2     12-03    139  |  108  |  14  ----------------------------<  146<H>  4.2   |  24  |  0.58    Ca    8.6      03 Dec 2022 06:33    
CC- s/p L2-5 lumbar laminectomy/PSF    HPI:  56yo/F with PMH HTN, obesity, rheumatoid arthritis, lumbar spinal stenosis presented for elective lumbar laminectomy/ spinal fusion. Hospitalist consult called for postop medical management    PMH- as above  PSH- LT TKR, hysterectomy, cholecystectomy  Soc hx- denies smoking, alcohol socially  Fam hx- f bone ca, diabetes, m dementia    12/3/22- using PCA for pain. Daniels in. BETITO- 415cc/24h  22- up and ambulating, brace on. Daniels removed and voiding. BETITO- 355cc/24h  22- Tmax last night 101.4, has some dry cough and mild dysuria. BETITO- 75cc/24h    Review of system- All 10 systems reviewed and is as per HPI otherwise negative.     Vital Signs Last 24 Hrs  T(C): 37 (05 Dec 2022 12:00), Max: 38.6 (04 Dec 2022 19:23)  T(F): 98.6 (05 Dec 2022 12:00), Max: 101.4 (04 Dec 2022 19:23)  HR: 95 (05 Dec 2022 08:55) (83 - 95)  BP: 109/46 (05 Dec 2022 08:55) (98/43 - 118/58)  BP(mean): --  RR: 18 (05 Dec 2022 08:55) (18 - 20)  SpO2: 100% (05 Dec 2022 08:55) (94% - 100%)    Parameters below as of 05 Dec 2022 08:55  Patient On (Oxygen Delivery Method): room air    LABS:                                   10.4   11.16 )-----------( 203      ( 05 Dec 2022 10:36 )             33.3     05 Dec 2022 10:36    136    |  105    |  21     ----------------------------<  99     5.0     |  32     |  0.49     Ca    9.1        05 Dec 2022 10:36    Urinalysis Basic - ( 05 Dec 2022 13:22 )  Color: Yellow / Appearance: Clear / S.010 / pH: x  Gluc: x / Ketone: Negative  / Bili: Negative / Urobili: Negative   Blood: x / Protein: Negative / Nitrite: Negative   Leuk Esterase: Trace / RBC: 0-2 /HPF / WBC 0-2 /HPF   Sq Epi: x / Non Sq Epi: Few / Bacteria: Occasional    RADIOLOGY & ADDITIONAL TESTS:      PHYSICAL EXAM:  GENERAL: NAD, well-groomed, well-developed  HEAD:  Atraumatic, Normocephalic  EYES: EOMI, PERRLA, conjunctiva and sclera clear  HEENT: Moist mucous membranes  NECK: Supple, No JVD  NERVOUS SYSTEM:  Alert & Oriented X3, Motor Strength 5/5 B/L upper and lower extremities; DTRs 2+ intact and symmetric  CHEST/LUNG: Clear to auscultation bilaterally; No rales, rhonchi, wheezing, or rubs  HEART: Regular rate and rhythm; No murmurs, rubs, or gallops  ABDOMEN: Soft, Nontender, Nondistended; Bowel sounds present  GENITOURINARY- Daniels removed- voiding  EXTREMITIES:  2+ Peripheral Pulses, No clubbing, cyanosis, or edema  MUSCULOSKELTAL- dressing dry, BETITO+  SKIN-no rash, no lesion  CNS- alert, oriented X3, non focal     MEDICATIONS  (STANDING):  amLODIPine   Tablet 10 milliGRAM(s) Oral daily  cyclobenzaprine 10 milliGRAM(s) Oral every 8 hours  FLUoxetine 40 milliGRAM(s) Oral daily  gabapentin 600 milliGRAM(s) Oral three times a day  HYDROmorphone PCA (1 mG/mL) 30 milliLiter(s) PCA Continuous PCA Continuous  influenza   Vaccine 0.5 milliLiter(s) IntraMuscular once  lactated ringers. 1000 milliLiter(s) (100 mL/Hr) IV Continuous <Continuous>  lisinopril 10 milliGRAM(s) Oral daily  montelukast 10 milliGRAM(s) Oral at bedtime  senna 2 Tablet(s) Oral at bedtime  tranexamic acid Infusion 1 mG/kG/Hr (52.8 mL/Hr) IV Continuous <Continuous>  tranexamic acid IVPB 1000 milliGRAM(s) IV Intermittent once    MEDICATIONS  (PRN):  acetaminophen     Tablet .. 650 milliGRAM(s) Oral every 6 hours PRN Temp greater or equal to 38.5C (101.3F), Mild Pain (1 - 3)  albuterol    90 MICROgram(s) HFA Inhaler 2 Puff(s) Inhalation every 6 hours PRN for shortness of breath and/or wheezing  clonazePAM  Tablet 1 milliGRAM(s) Oral three times a day PRN for anxiety  diazepam    Tablet 5 milliGRAM(s) Oral every 12 hours PRN muscle spasm  HYDROmorphone PCA (1 mG/mL) Rescue Clinician Bolus 0.5 milliGRAM(s) IV Push every 15 minutes PRN for Pain Scale GREATER THAN 6  morphine  - Injectable 4 milliGRAM(s) IV Push every 3 hours PRN Severe Pain (7 - 10)  naloxone Injectable 0.1 milliGRAM(s) IV Push every 3 minutes PRN For ANY of the following changes in patient status:  A. RR LESS THAN 10 breaths per minute, B. Oxygen saturation LESS THAN 90%, C. Sedation score of 6  ondansetron Injectable 4 milliGRAM(s) IV Push every 6 hours PRN Nausea  ondansetron Injectable 4 milliGRAM(s) IV Push every 6 hours PRN Nausea and/or Vomiting  oxyCODONE    IR 10 milliGRAM(s) Oral every 3 hours PRN Moderate Pain (4 - 6)      Assessment/Plan  #Lumbar spinal stenosis s/p L2-5 lumbar lami/PSF  Spine team following  Pain meds prn  Daniels removed- voiding  Incentive spirometry  OOb to ambulate, brace on  Monitor HH postop  Muscle relaxants, neurontin  Monitor BETITO drain output- 75cc    #Febrile episode  Patient is very concerned about possible MRSA as she had infections in the past  Will r/o other sources as well  UA negative  Will get blood c/s  Continue incentive spirometry- pt using it regularly  Monitor fever trends  No need for abx for now    #HTN  Norvasc    #DVT proph- venodynes    #Dispo- per spine    
Patient presents with increasing L leg pain and back pain. Studies revealed marked spinal stenosis L 3/4 and L2/3.L4/5. Patient failed nonoperative modalities and underwent lumbar lami L2-5, PSF L2-5 with SSS/LBG/BMP on 12/2/22.    12/3/22 Patient does not have L leg pain today-even while ambulating  12/4/22 Patient with incisional pain without radicular pain, restless night due to pain     PAST MEDICAL & SURGICAL HISTORY:  Lumbar stenosis      Spondylolisthesis  Lumbar      HTN (hypertension)      Peripheral edema      Rheumatoid arthritis      Anxiety and depression      Asthma      Lower back pain  with radiating pain to bilatral lower extremities      Osteoarthritis of both knees      Joint pain  Knees. left knee replaced.      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 Replaced after 9 months      MEDICATIONS  (STANDING):  amLODIPine   Tablet 10 milliGRAM(s) Oral daily  cyclobenzaprine 10 milliGRAM(s) Oral every 8 hours  FLUoxetine 40 milliGRAM(s) Oral daily  gabapentin 600 milliGRAM(s) Oral three times a day  HYDROmorphone PCA (1 mG/mL) 30 milliLiter(s) PCA Continuous PCA Continuous  influenza   Vaccine 0.5 milliLiter(s) IntraMuscular once  lactated ringers. 1000 milliLiter(s) (100 mL/Hr) IV Continuous <Continuous>  lisinopril 10 milliGRAM(s) Oral daily  montelukast 10 milliGRAM(s) Oral at bedtime  senna 2 Tablet(s) Oral at bedtime  tranexamic acid Infusion 1 mG/kG/Hr (52.8 mL/Hr) IV Continuous <Continuous>  tranexamic acid IVPB 1000 milliGRAM(s) IV Intermittent once    MEDICATIONS  (PRN):  acetaminophen     Tablet .. 650 milliGRAM(s) Oral every 6 hours PRN Temp greater or equal to 38.5C (101.3F), Mild Pain (1 - 3)  albuterol    90 MICROgram(s) HFA Inhaler 2 Puff(s) Inhalation every 6 hours PRN for shortness of breath and/or wheezing  clonazePAM  Tablet 1 milliGRAM(s) Oral three times a day PRN for anxiety  diazepam    Tablet 5 milliGRAM(s) Oral every 12 hours PRN muscle spasm  HYDROmorphone PCA (1 mG/mL) Rescue Clinician Bolus 0.5 milliGRAM(s) IV Push every 15 minutes PRN for Pain Scale GREATER THAN 6  morphine  - Injectable 4 milliGRAM(s) IV Push every 3 hours PRN Severe Pain (7 - 10)  naloxone Injectable 0.1 milliGRAM(s) IV Push every 3 minutes PRN For ANY of the following changes in patient status:  A. RR LESS THAN 10 breaths per minute, B. Oxygen saturation LESS THAN 90%, C. Sedation score of 6  ondansetron Injectable 4 milliGRAM(s) IV Push every 6 hours PRN Nausea  ondansetron Injectable 4 milliGRAM(s) IV Push every 6 hours PRN Nausea and/or Vomiting  oxyCODONE    IR 10 milliGRAM(s) Oral every 3 hours PRN Moderate Pain (4 - 6)    Allergies    Levaquin (Hives; Flushing)    Intolerances    PE:    Vital Signs Last 24 Hrs  T(C): 37 (04 Dec 2022 08:52), Max: 37 (04 Dec 2022 08:52)  T(F): 98.6 (04 Dec 2022 08:52), Max: 98.6 (04 Dec 2022 08:52)  HR: 79 (04 Dec 2022 08:52) (77 - 86)  BP: 100/50 (04 Dec 2022 08:52) (100/50 - 113/67)  BP(mean): 88 (03 Dec 2022 20:31) (88 - 88)  RR: 18 (04 Dec 2022 08:52) (18 - 18)  SpO2: 95% (04 Dec 2022 08:52) (94% - 95%)    Parameters below as of 04 Dec 2022 08:52  Patient On (Oxygen Delivery Method): room air    Patient sitting upright in bed-ambulated to , c/o incisional pain  Tolerating diet  Voiding with good U/O  Dressing clean and dry  BETITO with persistent moderate drainage  No motor deficits LEs    LABS                                   11.0   14.18 )-----------( 223      ( 03 Dec 2022 06:33 )             34.2     12-03    139  |  108  |  14  ----------------------------<  146<H>  4.2   |  24  |  0.58    Ca    8.6      03 Dec 2022 06:33    
Patient presents with increasing L leg pain and back pain. Studies revealed marked spinal stenosis L 3/4 and L2/3.L4/5. Patient failed nonoperative modalities and underwent lumbar lami L2-5, PSF L2-5 with SSS/LBG/BMP on 12/2/22.    12/3/22 Patient does not have L leg pain today-even while ambulating  12/4/22 Patient with incisional pain without radicular pain, restless night due to pain   12/5/22 Patient comfortable-(+) fever overnight    PAST MEDICAL & SURGICAL HISTORY:  Lumbar stenosis      Spondylolisthesis  Lumbar      HTN (hypertension)      Peripheral edema      Rheumatoid arthritis      Anxiety and depression      Asthma      Lower back pain  with radiating pain to bilatral lower extremities      Osteoarthritis of both knees      Joint pain  Knees. left knee replaced.      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 Replaced after 9 months      MEDICATIONS  (STANDING):  amLODIPine   Tablet 10 milliGRAM(s) Oral daily  cyclobenzaprine 10 milliGRAM(s) Oral every 8 hours  FLUoxetine 40 milliGRAM(s) Oral daily  gabapentin 600 milliGRAM(s) Oral three times a day  HYDROmorphone PCA (1 mG/mL) 30 milliLiter(s) PCA Continuous PCA Continuous  influenza   Vaccine 0.5 milliLiter(s) IntraMuscular once  lactated ringers. 1000 milliLiter(s) (100 mL/Hr) IV Continuous <Continuous>  lisinopril 10 milliGRAM(s) Oral daily  montelukast 10 milliGRAM(s) Oral at bedtime  senna 2 Tablet(s) Oral at bedtime  tranexamic acid Infusion 1 mG/kG/Hr (52.8 mL/Hr) IV Continuous <Continuous>  tranexamic acid IVPB 1000 milliGRAM(s) IV Intermittent once    MEDICATIONS  (PRN):  acetaminophen     Tablet .. 650 milliGRAM(s) Oral every 6 hours PRN Temp greater or equal to 38.5C (101.3F), Mild Pain (1 - 3)  albuterol    90 MICROgram(s) HFA Inhaler 2 Puff(s) Inhalation every 6 hours PRN for shortness of breath and/or wheezing  clonazePAM  Tablet 1 milliGRAM(s) Oral three times a day PRN for anxiety  diazepam    Tablet 5 milliGRAM(s) Oral every 12 hours PRN muscle spasm  HYDROmorphone PCA (1 mG/mL) Rescue Clinician Bolus 0.5 milliGRAM(s) IV Push every 15 minutes PRN for Pain Scale GREATER THAN 6  morphine  - Injectable 4 milliGRAM(s) IV Push every 3 hours PRN Severe Pain (7 - 10)  naloxone Injectable 0.1 milliGRAM(s) IV Push every 3 minutes PRN For ANY of the following changes in patient status:  A. RR LESS THAN 10 breaths per minute, B. Oxygen saturation LESS THAN 90%, C. Sedation score of 6  ondansetron Injectable 4 milliGRAM(s) IV Push every 6 hours PRN Nausea  ondansetron Injectable 4 milliGRAM(s) IV Push every 6 hours PRN Nausea and/or Vomiting  oxyCODONE    IR 10 milliGRAM(s) Oral every 3 hours PRN Moderate Pain (4 - 6)    Allergies    Levaquin (Hives; Flushing)    Intolerances    PE:    Vital Signs Last 24 Hrs  T(C): 37.8 (05 Dec 2022 08:55), Max: 38.6 (04 Dec 2022 19:23)  T(F): 100 (05 Dec 2022 08:55), Max: 101.4 (04 Dec 2022 19:23)  HR: 95 (05 Dec 2022 08:55) (83 - 95)  BP: 109/46 (05 Dec 2022 08:55) (98/43 - 118/58)  BP(mean): --  RR: 18 (05 Dec 2022 08:55) (18 - 20)  SpO2: 100% (05 Dec 2022 08:55) (94% - 100%)    Parameters below as of 05 Dec 2022 08:55  Patient On (Oxygen Delivery Method): room air    Patient sitting upright in bed-ambulated to , c/o incisional pain  Tolerating diet  Voiding with good U/O  Dressing clean and dry  BETITO with decreasing moderate drainage-left in  No motor deficits LEs    LABS             pending

## 2022-12-07 NOTE — DISCHARGE NOTE PROVIDER - NSDCMRMEDTOKEN_GEN_ALL_CORE_FT
Albuterol (Eqv-ProAir HFA) 90 mcg/inh inhalation aerosol: 2 puff(s) inhaled every 6 hours, As Needed - for shortness of breath and/or wheezing  amLODIPine 10 mg oral tablet: 1 tab(s) orally once a day (in the morning)  clonazePAM 1 mg oral tablet: 1 tab(s) orally 3 times a day, As Needed - for anxiety  cyclobenzaprine 10 mg oral tablet: 1 tab(s) orally 3 times a day, As Needed for muscle spasm  FLUoxetine 40 mg oral capsule: 1 cap(s) orally once a day (in the morning)  gabapentin 300 mg oral capsule: 2 cap(s) orally 3 times a day  hydrocodone- Acetaminophen:  mg  1 tab by mouth every 4 to 6 hours as needed , max. 4 perday  leflunomide 20 mg oral tablet: 1 tab(s) orally once a day (in the morning)  lisinopril 10 mg oral tablet: 1 tab(s) orally 2 times a day  montelukast 10 mg oral tablet: 1 tab(s) orally once a day (at bedtime)  silver sulfADIAZINE 1% topical cream: 1 application topically once a day MDD:1

## 2022-12-07 NOTE — DISCHARGE NOTE PROVIDER - HOSPITAL COURSE
Patient presents with increasing L leg pain and back pain. Studies revealed marked spinal stenosis L 3/4 and L2/3.L4/5. Patient failed nonoperative modalities and underwent lumbar lami L2-5, PSF L2-5 with SSS/LBG/BMP on 12/2/22.    12/3/22 Patient does not have L leg pain today-even while ambulating. PCA continues.  12/4/22 Patient with incisional pain without radicular pain, restless night due to pain. PCA discontinued.    12/5/22 Patient comfortable-(+) fever overnight. BC drawn.  12/6/22 Patient developed blisters due to tegaderm yesterday-dressing removed. Silvadene applied. BETITO dislodged. Incision clean and dry. BC negative. WBC improved  12/7/22 Patient comfortable. Low grade temp overnight. WBC normal today. Incision clean and dry. Neuro intact. Blisters improved. Stable for D/C today

## 2022-12-07 NOTE — DISCHARGE NOTE PROVIDER - CARE PROVIDER_API CALL
Vasyl Schaefer)  Orthopaedic Surgery  [address] [place] [address]  Phone: [phone]  Fax: [phone]  Follow Up Time:

## 2022-12-09 DIAGNOSIS — E66.9 OBESITY, UNSPECIFIED: ICD-10-CM

## 2022-12-09 DIAGNOSIS — M48.061 SPINAL STENOSIS, LUMBAR REGION WITHOUT NEUROGENIC CLAUDICATION: ICD-10-CM
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
## 2022-12-09 DIAGNOSIS — Z86.14 PERSONAL HISTORY OF METHICILLIN RESISTANT STAPHYLOCOCCUS AUREUS INFECTION: ICD-10-CM

## 2022-12-09 DIAGNOSIS — M43.16 SPONDYLOLISTHESIS, LUMBAR REGION: ICD-10-CM

## 2022-12-09 DIAGNOSIS — Z88.1 ALLERGY STATUS TO OTHER ANTIBIOTIC AGENTS STATUS: ICD-10-CM

## 2022-12-09 DIAGNOSIS — Z86.16 PERSONAL HISTORY OF COVID-19: ICD-10-CM
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
## 2022-12-09 DIAGNOSIS — M06.9 RHEUMATOID ARTHRITIS, UNSPECIFIED: ICD-10-CM

## 2022-12-09 DIAGNOSIS — R50.9 FEVER, UNSPECIFIED: ICD-10-CM

## 2022-12-09 DIAGNOSIS — J45.901 UNSPECIFIED ASTHMA WITH (ACUTE) EXACERBATION: ICD-10-CM

## 2022-12-09 DIAGNOSIS — S30.820A BLISTER (NONTHERMAL) OF LOWER BACK AND PELVIS, INITIAL ENCOUNTER: ICD-10-CM

## 2022-12-09 DIAGNOSIS — Z96.652 PRESENCE OF LEFT ARTIFICIAL KNEE JOINT: ICD-10-CM

## 2022-12-09 DIAGNOSIS — I10 ESSENTIAL (PRIMARY) HYPERTENSION: ICD-10-CM

## 2022-12-09 DIAGNOSIS — F41.8 OTHER SPECIFIED ANXIETY DISORDERS: ICD-10-CM

## 2022-12-10 LAB
CULTURE RESULTS: SIGNIFICANT CHANGE UP
SPECIMEN SOURCE: SIGNIFICANT CHANGE UP

## 2023-01-14 ENCOUNTER — EMERGENCY (EMERGENCY)
Facility: HOSPITAL | Age: 58
LOS: 0 days | Discharge: HOME | End: 2023-01-14
Attending: EMERGENCY MEDICINE
Payer: MEDICARE

## 2023-01-14 VITALS
DIASTOLIC BLOOD PRESSURE: 96 MMHG | TEMPERATURE: 98 F | WEIGHT: 231.04 LBS | HEART RATE: 91 BPM | HEIGHT: 64 IN | SYSTOLIC BLOOD PRESSURE: 168 MMHG | OXYGEN SATURATION: 100 % | RESPIRATION RATE: 19 BRPM

## 2023-01-14 DIAGNOSIS — Z90.710 ACQUIRED ABSENCE OF BOTH CERVIX AND UTERUS: ICD-10-CM

## 2023-01-14 DIAGNOSIS — Z86.39 PERSONAL HISTORY OF OTHER ENDOCRINE, NUTRITIONAL AND METABOLIC DISEASE: ICD-10-CM

## 2023-01-14 DIAGNOSIS — Z96.652 PRESENCE OF LEFT ARTIFICIAL KNEE JOINT: Chronic | ICD-10-CM

## 2023-01-14 DIAGNOSIS — Z90.49 ACQUIRED ABSENCE OF OTHER SPECIFIED PARTS OF DIGESTIVE TRACT: Chronic | ICD-10-CM

## 2023-01-14 DIAGNOSIS — J45.909 UNSPECIFIED ASTHMA, UNCOMPLICATED: ICD-10-CM

## 2023-01-14 DIAGNOSIS — R68.83 CHILLS (WITHOUT FEVER): ICD-10-CM

## 2023-01-14 DIAGNOSIS — I10 ESSENTIAL (PRIMARY) HYPERTENSION: ICD-10-CM

## 2023-01-14 DIAGNOSIS — Z90.710 ACQUIRED ABSENCE OF BOTH CERVIX AND UTERUS: Chronic | ICD-10-CM

## 2023-01-14 DIAGNOSIS — Z90.49 ACQUIRED ABSENCE OF OTHER SPECIFIED PARTS OF DIGESTIVE TRACT: ICD-10-CM

## 2023-01-14 DIAGNOSIS — M43.16 SPONDYLOLISTHESIS, LUMBAR REGION: ICD-10-CM

## 2023-01-14 DIAGNOSIS — Z20.822 CONTACT WITH AND (SUSPECTED) EXPOSURE TO COVID-19: ICD-10-CM

## 2023-01-14 DIAGNOSIS — F41.8 OTHER SPECIFIED ANXIETY DISORDERS: ICD-10-CM
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
## 2023-01-14 DIAGNOSIS — M06.9 RHEUMATOID ARTHRITIS, UNSPECIFIED: ICD-10-CM
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
## 2023-01-14 DIAGNOSIS — Z86.16 PERSONAL HISTORY OF COVID-19: ICD-10-CM

## 2023-01-14 DIAGNOSIS — T81.31XA DISRUPTION OF EXTERNAL OPERATION (SURGICAL) WOUND, NOT ELSEWHERE CLASSIFIED, INITIAL ENCOUNTER: ICD-10-CM

## 2023-01-14 DIAGNOSIS — Z98.890 OTHER SPECIFIED POSTPROCEDURAL STATES: Chronic | ICD-10-CM

## 2023-01-14 DIAGNOSIS — M17.0 BILATERAL PRIMARY OSTEOARTHRITIS OF KNEE: ICD-10-CM

## 2023-01-14 DIAGNOSIS — Z88.1 ALLERGY STATUS TO OTHER ANTIBIOTIC AGENTS STATUS: ICD-10-CM

## 2023-01-14 DIAGNOSIS — Z96.652 PRESENCE OF LEFT ARTIFICIAL KNEE JOINT: ICD-10-CM

## 2023-01-14 LAB
ALBUMIN SERPL ELPH-MCNC: 3.7 G/DL — SIGNIFICANT CHANGE UP (ref 3.3–5)
ALP SERPL-CCNC: 119 U/L — SIGNIFICANT CHANGE UP (ref 40–120)
ALT FLD-CCNC: 27 U/L — SIGNIFICANT CHANGE UP (ref 12–78)
ANION GAP SERPL CALC-SCNC: 5 MMOL/L — SIGNIFICANT CHANGE UP (ref 5–17)
APPEARANCE UR: CLEAR — SIGNIFICANT CHANGE UP
APTT BLD: 26.7 SEC — LOW (ref 27.5–35.5)
AST SERPL-CCNC: 17 U/L — SIGNIFICANT CHANGE UP (ref 15–37)
BASOPHILS # BLD AUTO: 0.04 K/UL — SIGNIFICANT CHANGE UP (ref 0–0.2)
BASOPHILS NFR BLD AUTO: 0.4 % — SIGNIFICANT CHANGE UP (ref 0–2)
BILIRUB SERPL-MCNC: 0.2 MG/DL — SIGNIFICANT CHANGE UP (ref 0.2–1.2)
BILIRUB UR-MCNC: NEGATIVE — SIGNIFICANT CHANGE UP
BUN SERPL-MCNC: 24 MG/DL — HIGH (ref 7–23)
CALCIUM SERPL-MCNC: 9.5 MG/DL — SIGNIFICANT CHANGE UP (ref 8.5–10.1)
CHLORIDE SERPL-SCNC: 106 MMOL/L — SIGNIFICANT CHANGE UP (ref 96–108)
CO2 SERPL-SCNC: 28 MMOL/L — SIGNIFICANT CHANGE UP (ref 22–31)
COLOR SPEC: YELLOW — SIGNIFICANT CHANGE UP
CREAT SERPL-MCNC: 0.59 MG/DL — SIGNIFICANT CHANGE UP (ref 0.5–1.3)
DIFF PNL FLD: NEGATIVE — SIGNIFICANT CHANGE UP
EGFR: 104 ML/MIN/1.73M2 — SIGNIFICANT CHANGE UP
EOSINOPHIL # BLD AUTO: 0.35 K/UL — SIGNIFICANT CHANGE UP (ref 0–0.5)
EOSINOPHIL NFR BLD AUTO: 3.2 % — SIGNIFICANT CHANGE UP (ref 0–6)
ERYTHROCYTE [SEDIMENTATION RATE] IN BLOOD: 8 MM/HR — SIGNIFICANT CHANGE UP (ref 0–20)
FLUAV AG NPH QL: SIGNIFICANT CHANGE UP
FLUBV AG NPH QL: SIGNIFICANT CHANGE UP
GLUCOSE SERPL-MCNC: 97 MG/DL — SIGNIFICANT CHANGE UP (ref 70–99)
GLUCOSE UR QL: NEGATIVE — SIGNIFICANT CHANGE UP
HCT VFR BLD CALC: 41.4 % — SIGNIFICANT CHANGE UP (ref 34.5–45)
HGB BLD-MCNC: 13 G/DL — SIGNIFICANT CHANGE UP (ref 11.5–15.5)
IMM GRANULOCYTES NFR BLD AUTO: 0.4 % — SIGNIFICANT CHANGE UP (ref 0–0.9)
INR BLD: 0.91 RATIO — SIGNIFICANT CHANGE UP (ref 0.88–1.16)
KETONES UR-MCNC: NEGATIVE — SIGNIFICANT CHANGE UP
LACTATE SERPL-SCNC: 0.6 MMOL/L — LOW (ref 0.7–2)
LEUKOCYTE ESTERASE UR-ACNC: ABNORMAL
LYMPHOCYTES # BLD AUTO: 18.9 % — SIGNIFICANT CHANGE UP (ref 13–44)
LYMPHOCYTES # BLD AUTO: 2.09 K/UL — SIGNIFICANT CHANGE UP (ref 1–3.3)
MCHC RBC-ENTMCNC: 27.6 PG — SIGNIFICANT CHANGE UP (ref 27–34)
MCHC RBC-ENTMCNC: 31.4 GM/DL — LOW (ref 32–36)
MCV RBC AUTO: 87.9 FL — SIGNIFICANT CHANGE UP (ref 80–100)
MONOCYTES # BLD AUTO: 0.69 K/UL — SIGNIFICANT CHANGE UP (ref 0–0.9)
MONOCYTES NFR BLD AUTO: 6.2 % — SIGNIFICANT CHANGE UP (ref 2–14)
NEUTROPHILS # BLD AUTO: 7.84 K/UL — HIGH (ref 1.8–7.4)
NEUTROPHILS NFR BLD AUTO: 70.9 % — SIGNIFICANT CHANGE UP (ref 43–77)
NITRITE UR-MCNC: NEGATIVE — SIGNIFICANT CHANGE UP
PH UR: 5 — SIGNIFICANT CHANGE UP (ref 5–8)
PLATELET # BLD AUTO: 219 K/UL — SIGNIFICANT CHANGE UP (ref 150–400)
POTASSIUM SERPL-MCNC: 4.1 MMOL/L — SIGNIFICANT CHANGE UP (ref 3.5–5.3)
POTASSIUM SERPL-SCNC: 4.1 MMOL/L — SIGNIFICANT CHANGE UP (ref 3.5–5.3)
PROT SERPL-MCNC: 7.7 GM/DL — SIGNIFICANT CHANGE UP (ref 6–8.3)
PROT UR-MCNC: NEGATIVE — SIGNIFICANT CHANGE UP
PROTHROM AB SERPL-ACNC: 10.5 SEC — SIGNIFICANT CHANGE UP (ref 10.5–13.4)
RBC # BLD: 4.71 M/UL — SIGNIFICANT CHANGE UP (ref 3.8–5.2)
RBC # FLD: 13.6 % — SIGNIFICANT CHANGE UP (ref 10.3–14.5)
RSV RNA NPH QL NAA+NON-PROBE: SIGNIFICANT CHANGE UP
SARS-COV-2 RNA SPEC QL NAA+PROBE: SIGNIFICANT CHANGE UP
SODIUM SERPL-SCNC: 139 MMOL/L — SIGNIFICANT CHANGE UP (ref 135–145)
SP GR SPEC: 1.03 — HIGH (ref 1.01–1.02)
UROBILINOGEN FLD QL: NEGATIVE — SIGNIFICANT CHANGE UP
WBC # BLD: 11.05 K/UL — HIGH (ref 3.8–10.5)
WBC # FLD AUTO: 11.05 K/UL — HIGH (ref 3.8–10.5)

## 2023-01-14 PROCEDURE — 93005 ELECTROCARDIOGRAM TRACING: CPT

## 2023-01-14 PROCEDURE — 85730 THROMBOPLASTIN TIME PARTIAL: CPT

## 2023-01-14 PROCEDURE — 72110 X-RAY EXAM L-2 SPINE 4/>VWS: CPT

## 2023-01-14 PROCEDURE — 85652 RBC SED RATE AUTOMATED: CPT

## 2023-01-14 PROCEDURE — 81001 URINALYSIS AUTO W/SCOPE: CPT

## 2023-01-14 PROCEDURE — 93010 ELECTROCARDIOGRAM REPORT: CPT

## 2023-01-14 PROCEDURE — 36415 COLL VENOUS BLD VENIPUNCTURE: CPT

## 2023-01-14 PROCEDURE — 83605 ASSAY OF LACTIC ACID: CPT

## 2023-01-14 PROCEDURE — 99285 EMERGENCY DEPT VISIT HI MDM: CPT | Mod: 25

## 2023-01-14 PROCEDURE — 80053 COMPREHEN METABOLIC PANEL: CPT

## 2023-01-14 PROCEDURE — 87040 BLOOD CULTURE FOR BACTERIA: CPT

## 2023-01-14 PROCEDURE — 85025 COMPLETE CBC W/AUTO DIFF WBC: CPT

## 2023-01-14 PROCEDURE — 72110 X-RAY EXAM L-2 SPINE 4/>VWS: CPT | Mod: 26

## 2023-01-14 PROCEDURE — 87086 URINE CULTURE/COLONY COUNT: CPT

## 2023-01-14 PROCEDURE — 85610 PROTHROMBIN TIME: CPT

## 2023-01-14 PROCEDURE — 0241U: CPT

## 2023-01-14 PROCEDURE — 99285 EMERGENCY DEPT VISIT HI MDM: CPT

## 2023-01-14 PROCEDURE — 86140 C-REACTIVE PROTEIN: CPT

## 2023-01-14 NOTE — ED STATDOCS - PATIENT PORTAL LINK FT
You can access the FollowMyHealth Patient Portal offered by [facility] by registering at the following website: [email or web address]. By joining [employer]’s FollowMyHealth portal, you will also be able to view your health information using other applications (apps) compatible with our system.

## 2023-01-14 NOTE — ED ADULT TRIAGE NOTE - CHIEF COMPLAINT QUOTE
pt presents to ED due to concerns of infection of surgical site on back pt had surgery with MD Agrawal 1 month ago and now seeing the wound starting to open denies fever or chills

## 2023-01-14 NOTE — ED STATDOCS - NSFOLLOWUPINSTRUCTIONS_ED_ALL_ED_FT
Follow up with your spine doctor for further evaluation of your wound.  Continue to take your antibiotics as directed.     Return to the ER for any new or other concerns.

## 2023-01-14 NOTE — ED STATDOCS - PHYSICAL EXAMINATION
Constitutional: NAD, well appearing  HEENT: no rhinorrhea, PERRL, no oropharyngeal erythema or exudates, midline uvula.  TMs clear.  CVS:  RRR, no m/r/g  Resp:  CTAB  GI: soft, ntnd  MSK:  no restriction to rom, full ROM to all extremities  Neuro:  A&Ox3, 5/5 strength to all extremities,  SILT to all extremities  Skin: no rash. +slight dehiscence to surgical site lumbar spine, with mild surrounding erythema, non-tender. No purulent drainage.  psych: clear thought content  Heme/lymph:  No LAD

## 2023-01-14 NOTE — CONSULT NOTE ADULT - SUBJECTIVE AND OBJECTIVE BOX
Patient is a 57y Female who presents with concern of SSI. Patient had L2-5 lami fusion done on 22. Patient was seen 1.5 weeks ago in office and was prescribed duricef for delayed wound healing. Patient has been adherent to medications. Patient presents today with concern for surgical site infection. Patient and daughter at bedside. They have been concerned about surgical site because now it appears to show further white tissue and has not significantly improved on oral abx. Patient has had slightly increased serous drainage of the incision. 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 Denies trauma/falls. Denies HS/LOC. Denies pain/injury elsewhere. Denies numbness/tingling/paresthesias/weakness. Denies bowel/bladder incontinence. Denies fevers/chills. No other complaints at this time.    HEALTH ISSUES - PROBLEM Dx:          MEDICATIONS  (STANDING):      Allergies    Levaquin (Hives; Flushing)    Intolerances        PAST MEDICAL & SURGICAL HISTORY:  Lumbar stenosis    Spondylolisthesis  Lumbar    HTN (hypertension)    Peripheral edema    Rheumatoid arthritis    Anxiety and depression    Asthma    Lower back pain  with radiating pain to bilatral lower extremities    Osteoarthritis of both knees    Joint pain  Knees. left knee replaced.    Obesity    Seasonal allergies    Prolapse of female pelvic organs    Eczema    2019 novel coronavirus disease (COVID-19)  2022    History of cholecystectomy  2018    Status post creation of urethral sling by suprapubic approach  at 33 years old    History of hysterectomy  at 33 years old    H/O total knee replacement, left  . Developed MRSA. Infected knee removed. Replaced after 9 months                              13.0   11.05 )-----------( 219      ( 2023 19:36 )             41.4       2023 19:36    139    |  106    |  24     ----------------------------<  97     4.1     |  28     |  0.59     Ca    9.5        2023 19:36    TPro  7.7    /  Alb  3.7    /  TBili  0.2    /  DBili  x      /  AST  17     /  ALT  27     /  AlkPhos  119    2023 19:36      PT/INR - ( 2023 19:36 )   PT: 10.5 sec;   INR: 0.91 ratio         PTT - ( 2023 19:36 )  PTT:26.7 sec    Urinalysis Basic - ( 2023 19:24 )    Color: Yellow / Appearance: Clear / S.030 / pH: x  Gluc: x / Ketone: Negative  / Bili: Negative / Urobili: Negative   Blood: x / Protein: Negative / Nitrite: Negative   Leuk Esterase: Trace / RBC: Negative /HPF / WBC 0-2 /HPF   Sq Epi: x / Non Sq Epi: Few / Bacteria: Occasional        Vital Signs Last 24 Hrs  T(C): 36.5 (23 @ 18:20), Max: 36.5 (23 @ 18:20)  T(F): 97.7 (23 @ 18:20), Max: 97.7 (23 @ 18:20)  HR: 91 (23 @ 18:20) (91 - 91)  BP: 168/96 (23 @ 18:20) (168/96 - 168/96)  BP(mean): 116 (23 @ 18:20) (116 - 116)  RR: 19 (23 @ 18:20) (19 - 19)  SpO2: 100% (23 @ 18:20) (100% - 100%)    Physical Exam:  Gen: NAD  Spine:  Surgical site well healed except for 1cm site of dehicense approx .5cm in depth, with granulation tissue noted.  No expressible drainage but mild serous drainage noted  No flunctuance noted, no expressible fluid  Mild erythema surrounding surgical dehiscence  No midline TTPL/S spine  No bony step offs  No paraspinal muscle ttp/hypertonicity   Negative Straight leg raise  Negative clonus  Negative babinski  Negative linton    Motor:                 L2             L3             L4               L5            S1  R         5/5           5/5          5/5             5/5           5/5  L          5/5          5/5           5/5             5/5           5/5    Sensory:               L2          L3         L4      L5       S1         (0=absent, 1=impaired, 2=normal, NT=not testable)  R         2            2            2        2        2  L          2            2           2        2         2    Imaging: XR Pending    ********Incomplete plan    A/P: 57y Female with local surgical site dehiscence sp L2-5 Lami fusion  Small surgical site dehiscence with granulation tissues, small area of erythema  Pain control PRN  WBAT with assistive devices as needed  FU Labs/imaging  SCDs  Continue PO ABx  No acute orthopaedic surgical intervention indicated at this time. This patient is orthopaedically stable for discharge.   Patient to follow up with Dr. Schaefer as an outpatient for further evaluation and management, at next clinic day, discussed with patient and family who agrees with plan.   All questions answered   Patient is a 57y Female who presents with concern of SSI. Patient had L2-5 lami fusion done on 22. Patient was seen 1.5 weeks ago in office and was prescribed duricef for delayed wound healing. Patient states she has been adherent to medications. Patient presents today with concern for surgical site infection. Patient and daughter at bedside. They have been concerned about surgical site because now it appears to show further white tissue and has not significantly improved on oral abx. Patient has had slightly increased serous drainage of the incision. 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 Denies trauma/falls. Denies HS/LOC. Denies pain/injury elsewhere. Denies numbness/tingling/paresthesias/weakness. Denies bowel/bladder incontinence. Denies fevers/chills. No other complaints at this time.    HEALTH ISSUES - PROBLEM Dx:          MEDICATIONS  (STANDING):      Allergies    Levaquin (Hives; Flushing)    Intolerances        PAST MEDICAL & SURGICAL HISTORY:  Lumbar stenosis    Spondylolisthesis  Lumbar    HTN (hypertension)    Peripheral edema    Rheumatoid arthritis    Anxiety and depression    Asthma    Lower back pain  with radiating pain to bilatral lower extremities    Osteoarthritis of both knees    Joint pain  Knees. left knee replaced.    Obesity    Seasonal allergies    Prolapse of female pelvic organs    Eczema    2019 novel coronavirus disease (COVID-19)  2022    History of cholecystectomy  2018    Status post creation of urethral sling by suprapubic approach  at 33 years old    History of hysterectomy  at 33 years old    H/O total knee replacement, left  . Developed MRSA. Infected knee removed. Replaced after 9 months                              13.0   11.05 )-----------( 219      ( 2023 19:36 )             41.4       2023 19:36    139    |  106    |  24     ----------------------------<  97     4.1     |  28     |  0.59     Ca    9.5        2023 19:36    TPro  7.7    /  Alb  3.7    /  TBili  0.2    /  DBili  x      /  AST  17     /  ALT  27     /  AlkPhos  119    2023 19:36      PT/INR - ( 2023 19:36 )   PT: 10.5 sec;   INR: 0.91 ratio         PTT - ( 2023 19:36 )  PTT:26.7 sec    Urinalysis Basic - ( 2023 19:24 )    Color: Yellow / Appearance: Clear / S.030 / pH: x  Gluc: x / Ketone: Negative  / Bili: Negative / Urobili: Negative   Blood: x / Protein: Negative / Nitrite: Negative   Leuk Esterase: Trace / RBC: Negative /HPF / WBC 0-2 /HPF   Sq Epi: x / Non Sq Epi: Few / Bacteria: Occasional        Vital Signs Last 24 Hrs  T(C): 36.5 (23 @ 18:20), Max: 36.5 (23 @ 18:20)  T(F): 97.7 (23 @ 18:20), Max: 97.7 (23 @ 18:20)  HR: 91 (23 @ 18:20) (91 - 91)  BP: 168/96 (23 @ 18:20) (168/96 - 168/96)  BP(mean): 116 (23 @ 18:20) (116 - 116)  RR: 19 (23 @ 18:20) (19 - 19)  SpO2: 100% (23 @ 18:20) (100% - 100%)    Physical Exam:  Gen: NAD  Spine:  Surgical site well healed except for 1cm site of dehicense approx .5cm in depth, with granulation tissue noted.  No expressible drainage but mild serous drainage noted  No flunctuance noted, no expressible fluid  Mild erythema surrounding surgical dehiscence  No midline TTPL/S spine  No bony step offs  No paraspinal muscle ttp/hypertonicity   Negative Straight leg raise  Negative clonus  Negative babinski  Negative linton    Motor:                 L2             L3             L4               L5            S1  R         5/5           5/5          5/5             5/5           5/5  L          5/5          5/5           5/5             5/5           5/5    Sensory:               L2          L3         L4      L5       S1         (0=absent, 1=impaired, 2=normal, NT=not testable)  R         2            2            2        2        2  L          2            2           2        2         2    Imaging: XR for monitoring Pending      A/P: 57y Female with local surgical site dehiscence sp L2-5 Lami fusion  Small surgical site dehiscence with granulation tissues, small area of erythema  Low suspicion of SSI at this time, scott small dehiscence with delayed wound healing  Continue with 1 further week of duricef, FU with Dr. Schaefer early next week for further discussions of wound care  Pain control PRN  WBAT with assistive devices as needed  FU Labs/imaging  Continue PO ABx  No acute orthopaedic surgical intervention indicated at this time. This patient is orthopaedically stable for discharge.   Patient to follow up with Dr. Schaefer as an outpatient for further evaluation and management, at next clinic day, discussed with patient and family who agrees with plan.   Discussed with Dr. Schaefer who agrees with above plan

## 2023-01-14 NOTE — ED STATDOCS - OBJECTIVE STATEMENT
58 y/o female PMHx of HTN, asthma, osteoarthritis, rheumatoid arthritis, lumbar spondylolisthesis s/p lumbar fusion and TKR presents to ED due to concerns of infection of surgical site on back, +slight dehiscence to surgical site lumbar spine, with mild surrounding erythema. Pt had surgery with MD Agrawal 1 month ago and now seeing the wound starting to open. Pt reports chills. Pt denies fever. Pt was put on Duricef 2 weeks ago but her symptoms have progressed despite being on it. Pt has a hx of post-op infections.

## 2023-01-14 NOTE — ED STATDOCS - CHPI ED SYMPTOM POS
+post op complication, +chills, +slight dehiscence to surgical site lumbar spine, with mild surrounding erythema/REDNESS

## 2023-01-14 NOTE — ED STATDOCS - NS ED ROS FT
Constitutional: nad, well appearing. No fever. +post op complication. +chills  HEENT:  no nasal congestion, eye drainage or ear pain.    CVS:  no cp  Resp:  No sob, no cough  GI:  no abdominal pain, no nausea or vomiting  :  no dysuria  MSK: no joint pain or limited ROM  Skin: no rash  Neuro: no change in mental status or level of consciousness  Heme/lymph: no bleeding

## 2023-01-14 NOTE — ED STATDOCS - NS ED ATTENDING STATEMENT MOD
This was a shared visit with the SAHIL. I reviewed and verified the documentation and independently performed the documented:

## 2023-01-14 NOTE — ED STATDOCS - CARE PROVIDER_API CALL
Vasyl Schaefer)  Orthopaedic Surgery  [address] [place] [address]  Phone: [phone]  Fax: [phone]  Follow Up Time:

## 2023-01-14 NOTE — ED STATDOCS - PROGRESS NOTE DETAILS
pt seen by ortho team recommend waiting on CRP and ESR and will consult with pt surgeon for definitive plan. pt aware and agrees with plan. -Carmencita Herman PA-C Ortho came to see pt. Pt can be d/c home and continue abx. -Eliseo Rain PA-C ESR negative.  Pt without any significant pain.  No LE symptoms.  No bowel or bladder symptoms.  No fevers.  Wound appears dehisced, but no purulent drainage.  Dr. Schaefer recommends d/c home on continued course of abx.  I offered admission for MRI - pt declines.  Would like to f/u outpatient.  Understands risks and understands indications to return.  D/c home with strict return precautions and prompt outpatient f/u.  Henrique Palomares M.D.

## 2023-01-14 NOTE — ED STATDOCS - CLINICAL SUMMARY MEDICAL DECISION MAKING FREE TEXT BOX
Pt afebrile in ED. No bowel or bladder issues. Given chills at night, will check labs, cultures, ESR. I discussed case with Dr. Schaefer's partner who asked for orthopedic eval in department. Consider MRI. Dispo pending imaging. Pt afebrile in ED. No bowel or bladder issues.  No LE neuro symptoms. Given occasional chills, will check labs, cultures, ESR. I discussed case with Dr. Schaefer's partner and asked for orthopedic eval in department. Consider MRI. Dispo pending imaging.

## 2023-01-15 PROBLEM — J30.2 OTHER SEASONAL ALLERGIC RHINITIS: Chronic | Status: ACTIVE | Noted: 2022-11-17

## 2023-01-15 PROBLEM — E66.9 OBESITY, UNSPECIFIED: Chronic | Status: ACTIVE | Noted: 2022-11-17

## 2023-01-15 PROBLEM — U07.1 COVID-19: Chronic | Status: ACTIVE | Noted: 2022-11-17

## 2023-01-15 PROBLEM — N81.9 FEMALE GENITAL PROLAPSE, UNSPECIFIED: Chronic | Status: ACTIVE | Noted: 2022-11-17

## 2023-01-15 PROBLEM — M25.50 PAIN IN UNSPECIFIED JOINT: Chronic | Status: ACTIVE | Noted: 2022-11-17

## 2023-01-15 PROBLEM — I10 ESSENTIAL (PRIMARY) HYPERTENSION: Chronic | Status: ACTIVE | Noted: 2022-11-17

## 2023-01-15 PROBLEM — M17.0 BILATERAL PRIMARY OSTEOARTHRITIS OF KNEE: Chronic | Status: ACTIVE | Noted: 2022-11-17

## 2023-01-15 PROBLEM — M43.10 SPONDYLOLISTHESIS, SITE UNSPECIFIED: Chronic | Status: ACTIVE | Noted: 2022-11-17

## 2023-01-15 PROBLEM — F41.9 ANXIETY DISORDER, UNSPECIFIED: Chronic | Status: ACTIVE | Noted: 2022-11-17

## 2023-01-15 PROBLEM — M48.061 SPINAL STENOSIS, LUMBAR REGION WITHOUT NEUROGENIC CLAUDICATION: Chronic | Status: ACTIVE | Noted: 2022-11-17

## 2023-01-15 PROBLEM — M54.50 LOW BACK PAIN, UNSPECIFIED: Chronic | Status: ACTIVE | Noted: 2022-11-17

## 2023-01-15 PROBLEM — M06.9 RHEUMATOID ARTHRITIS, UNSPECIFIED: Chronic | Status: ACTIVE | Noted: 2022-11-17

## 2023-01-15 PROBLEM — L30.9 DERMATITIS, UNSPECIFIED: Chronic | Status: ACTIVE | Noted: 2022-11-17

## 2023-01-15 PROBLEM — R60.9 EDEMA, UNSPECIFIED: Chronic | Status: ACTIVE | Noted: 2022-11-17

## 2023-01-15 PROBLEM — J45.909 UNSPECIFIED ASTHMA, UNCOMPLICATED: Chronic | Status: ACTIVE | Noted: 2022-11-17

## 2023-01-15 LAB
CRP SERPL-MCNC: <3 MG/L — SIGNIFICANT CHANGE UP
CULTURE RESULTS: SIGNIFICANT CHANGE UP
SPECIMEN SOURCE: SIGNIFICANT CHANGE UP

## 2023-01-16 ENCOUNTER — INPATIENT (INPATIENT)
Facility: HOSPITAL | Age: 58
LOS: 5 days | Discharge: HOME | DRG: 857 | End: 2023-01-22
Attending: ORTHOPAEDIC SURGERY | Admitting: ORTHOPAEDIC SURGERY
Payer: MEDICARE

## 2023-01-16 VITALS — HEIGHT: 66 IN | WEIGHT: 229.94 LBS

## 2023-01-16 DIAGNOSIS — Z90.710 ACQUIRED ABSENCE OF BOTH CERVIX AND UTERUS: Chronic | ICD-10-CM

## 2023-01-16 DIAGNOSIS — Z98.890 OTHER SPECIFIED POSTPROCEDURAL STATES: Chronic | ICD-10-CM

## 2023-01-16 DIAGNOSIS — Z90.49 ACQUIRED ABSENCE OF OTHER SPECIFIED PARTS OF DIGESTIVE TRACT: Chronic | ICD-10-CM

## 2023-01-16 DIAGNOSIS — Z96.652 PRESENCE OF LEFT ARTIFICIAL KNEE JOINT: Chronic | ICD-10-CM

## 2023-01-16 PROCEDURE — 99285 EMERGENCY DEPT VISIT HI MDM: CPT

## 2023-01-16 RX ORDER — SODIUM CHLORIDE 9 MG/ML
1000 INJECTION, SOLUTION INTRAVENOUS
Refills: 0 | Status: DISCONTINUED | OUTPATIENT
Start: 2023-01-16 | End: 2023-01-22

## 2023-01-16 RX ORDER — HYDROMORPHONE HYDROCHLORIDE 2 MG/ML
0.5 INJECTION INTRAMUSCULAR; INTRAVENOUS; SUBCUTANEOUS EVERY 6 HOURS
Refills: 0 | Status: DISCONTINUED | OUTPATIENT
Start: 2023-01-16 | End: 2023-01-22

## 2023-01-16 RX ORDER — OXYCODONE HYDROCHLORIDE 5 MG/1
5 TABLET ORAL EVERY 4 HOURS
Refills: 0 | Status: DISCONTINUED | OUTPATIENT
Start: 2023-01-16 | End: 2023-01-22

## 2023-01-16 RX ORDER — SODIUM CHLORIDE 9 MG/ML
1000 INJECTION INTRAMUSCULAR; INTRAVENOUS; SUBCUTANEOUS ONCE
Refills: 0 | Status: COMPLETED | OUTPATIENT
Start: 2023-01-16 | End: 2023-01-16

## 2023-01-16 RX ORDER — OXYCODONE HYDROCHLORIDE 5 MG/1
10 TABLET ORAL EVERY 4 HOURS
Refills: 0 | Status: DISCONTINUED | OUTPATIENT
Start: 2023-01-16 | End: 2023-01-19

## 2023-01-16 RX ORDER — ACETAMINOPHEN 500 MG
650 TABLET ORAL EVERY 6 HOURS
Refills: 0 | Status: DISCONTINUED | OUTPATIENT
Start: 2023-01-16 | End: 2023-01-22

## 2023-01-16 RX ADMIN — SODIUM CHLORIDE 1000 MILLILITER(S): 9 INJECTION INTRAMUSCULAR; INTRAVENOUS; SUBCUTANEOUS at 23:50

## 2023-01-16 NOTE — ED ADULT TRIAGE NOTE - CHIEF COMPLAINT QUOTE
Pt c/o infection from lumbar surgery operation on Nov. 29th. Allergy to Levaquin. Pt took hydrocodone 3hrs ago. Pt prescribed Duricef for 2 weeks. Pt A&ox4, ambulatory. Denies any fevers. As per note from , admit to Dr. Schaefer, and page ortho resident". NO s/s of distress.

## 2023-01-16 NOTE — ED PROVIDER NOTE - OBJECTIVE STATEMENT
56 yo female sent in by surgeon Dr. Schaefer for infection tba by ortho 58 yo female sent in by surgeon Dr. Schaefer for infection tba by ortho with local surgical site dehiscence sp L2-5 Lami fusion on 12/2/22  Small surgical site dehiscence with granulation tissues, small area of erythema  Low suspicion of SSI at this time, scott small dehiscence with delayed wound heal

## 2023-01-16 NOTE — ED PROVIDER NOTE - PHYSICAL EXAMINATION
Gen:  Well appearing in NAD  Head:  NC/AT  HEENT: pupils perrl,no pharyngeal erythema, uvula midline  Cardiac: S1S2, RRR  Abd: Soft, non tender  Resp: No distress, CTA   musculoskeletal:: no deformities, no swelling, strength +5/+5, lumbar midline surgical wound with incisional edges approximate with 1 cm of dehiscence with granulation tissue and serous drainage  Skin: warm and dry as visualized, no rashes  Neuro: DUPREE, aao x 4  Psych:alert, cooperative, appropriate mood and affect for situation

## 2023-01-16 NOTE — ED PROVIDER NOTE - PROGRESS NOTE DETAILS
spoke to ortho resident pt tba to Dr. Schaefer, he will place orders for antibiotcs WIL Mendosa DO spoke to vrad radiologist for results of ct ls spine without contrast, he isw unable to determine if there is an epidural abscess due to the artifact from hardwar. spoke to orthopedic resident who ordered study. pt tba, no further radiolgy study at this time WIL Mendosa DO

## 2023-01-17 ENCOUNTER — TRANSCRIPTION ENCOUNTER (OUTPATIENT)
Age: 58
End: 2023-01-17

## 2023-01-17 DIAGNOSIS — M54.9 DORSALGIA, UNSPECIFIED: ICD-10-CM

## 2023-01-17 LAB
ANION GAP SERPL CALC-SCNC: 8 MMOL/L — SIGNIFICANT CHANGE UP (ref 5–17)
ANION GAP SERPL CALC-SCNC: 9 MMOL/L — SIGNIFICANT CHANGE UP (ref 5–17)
APPEARANCE UR: CLEAR — SIGNIFICANT CHANGE UP
APTT BLD: 27.1 SEC — LOW (ref 27.5–35.5)
APTT BLD: 27.2 SEC — LOW (ref 27.5–35.5)
BACTERIA # UR AUTO: ABNORMAL
BILIRUB UR-MCNC: NEGATIVE — SIGNIFICANT CHANGE UP
BUN SERPL-MCNC: 14 MG/DL — SIGNIFICANT CHANGE UP (ref 7–23)
BUN SERPL-MCNC: 16 MG/DL — SIGNIFICANT CHANGE UP (ref 7–23)
CALCIUM SERPL-MCNC: 9.1 MG/DL — SIGNIFICANT CHANGE UP (ref 8.5–10.1)
CALCIUM SERPL-MCNC: 9.2 MG/DL — SIGNIFICANT CHANGE UP (ref 8.5–10.1)
CHLORIDE SERPL-SCNC: 103 MMOL/L — SIGNIFICANT CHANGE UP (ref 96–108)
CHLORIDE SERPL-SCNC: 104 MMOL/L — SIGNIFICANT CHANGE UP (ref 96–108)
CO2 SERPL-SCNC: 25 MMOL/L — SIGNIFICANT CHANGE UP (ref 22–31)
CO2 SERPL-SCNC: 26 MMOL/L — SIGNIFICANT CHANGE UP (ref 22–31)
COLOR SPEC: YELLOW — SIGNIFICANT CHANGE UP
CREAT SERPL-MCNC: 0.49 MG/DL — LOW (ref 0.5–1.3)
CREAT SERPL-MCNC: 0.55 MG/DL — SIGNIFICANT CHANGE UP (ref 0.5–1.3)
CRP SERPL-MCNC: 59 MG/L — HIGH
DIFF PNL FLD: NEGATIVE — SIGNIFICANT CHANGE UP
EGFR: 106 ML/MIN/1.73M2 — SIGNIFICANT CHANGE UP
EGFR: 108 ML/MIN/1.73M2 — SIGNIFICANT CHANGE UP
EPI CELLS # UR: ABNORMAL
ERYTHROCYTE [SEDIMENTATION RATE] IN BLOOD: 23 MM/HR — HIGH (ref 0–20)
FLUAV AG NPH QL: SIGNIFICANT CHANGE UP
FLUBV AG NPH QL: SIGNIFICANT CHANGE UP
GLUCOSE SERPL-MCNC: 110 MG/DL — HIGH (ref 70–99)
GLUCOSE SERPL-MCNC: 118 MG/DL — HIGH (ref 70–99)
GLUCOSE UR QL: NEGATIVE — SIGNIFICANT CHANGE UP
HCG SERPL-ACNC: 1 MIU/ML — SIGNIFICANT CHANGE UP
HCG SERPL-ACNC: 1 MIU/ML — SIGNIFICANT CHANGE UP
HCT VFR BLD CALC: 36.9 % — SIGNIFICANT CHANGE UP (ref 34.5–45)
HCT VFR BLD CALC: 40.5 % — SIGNIFICANT CHANGE UP (ref 34.5–45)
HCV AB S/CO SERPL IA: 0.33 S/CO — SIGNIFICANT CHANGE UP (ref 0–0.99)
HCV AB SERPL-IMP: SIGNIFICANT CHANGE UP
HGB BLD-MCNC: 12 G/DL — SIGNIFICANT CHANGE UP (ref 11.5–15.5)
HGB BLD-MCNC: 13 G/DL — SIGNIFICANT CHANGE UP (ref 11.5–15.5)
INR BLD: 0.95 RATIO — SIGNIFICANT CHANGE UP (ref 0.88–1.16)
INR BLD: 1.04 RATIO — SIGNIFICANT CHANGE UP (ref 0.88–1.16)
KETONES UR-MCNC: NEGATIVE — SIGNIFICANT CHANGE UP
LACTATE SERPL-SCNC: 1 MMOL/L — SIGNIFICANT CHANGE UP (ref 0.7–2)
LEUKOCYTE ESTERASE UR-ACNC: ABNORMAL
LIDOCAIN IGE QN: 101 U/L — SIGNIFICANT CHANGE UP (ref 73–393)
MCHC RBC-ENTMCNC: 27.8 PG — SIGNIFICANT CHANGE UP (ref 27–34)
MCHC RBC-ENTMCNC: 27.9 PG — SIGNIFICANT CHANGE UP (ref 27–34)
MCHC RBC-ENTMCNC: 32.1 GM/DL — SIGNIFICANT CHANGE UP (ref 32–36)
MCHC RBC-ENTMCNC: 32.5 GM/DL — SIGNIFICANT CHANGE UP (ref 32–36)
MCV RBC AUTO: 85.6 FL — SIGNIFICANT CHANGE UP (ref 80–100)
MCV RBC AUTO: 86.9 FL — SIGNIFICANT CHANGE UP (ref 80–100)
NITRITE UR-MCNC: NEGATIVE — SIGNIFICANT CHANGE UP
PH UR: 5 — SIGNIFICANT CHANGE UP (ref 5–8)
PLATELET # BLD AUTO: 196 K/UL — SIGNIFICANT CHANGE UP (ref 150–400)
PLATELET # BLD AUTO: 221 K/UL — SIGNIFICANT CHANGE UP (ref 150–400)
POTASSIUM SERPL-MCNC: 4 MMOL/L — SIGNIFICANT CHANGE UP (ref 3.5–5.3)
POTASSIUM SERPL-MCNC: 4.6 MMOL/L — SIGNIFICANT CHANGE UP (ref 3.5–5.3)
POTASSIUM SERPL-SCNC: 4 MMOL/L — SIGNIFICANT CHANGE UP (ref 3.5–5.3)
POTASSIUM SERPL-SCNC: 4.6 MMOL/L — SIGNIFICANT CHANGE UP (ref 3.5–5.3)
PROT UR-MCNC: NEGATIVE — SIGNIFICANT CHANGE UP
PROTHROM AB SERPL-ACNC: 11 SEC — SIGNIFICANT CHANGE UP (ref 10.5–13.4)
PROTHROM AB SERPL-ACNC: 12.1 SEC — SIGNIFICANT CHANGE UP (ref 10.5–13.4)
RAPID RVP RESULT: SIGNIFICANT CHANGE UP
RBC # BLD: 4.31 M/UL — SIGNIFICANT CHANGE UP (ref 3.8–5.2)
RBC # BLD: 4.66 M/UL — SIGNIFICANT CHANGE UP (ref 3.8–5.2)
RBC # FLD: 14 % — SIGNIFICANT CHANGE UP (ref 10.3–14.5)
RBC # FLD: 14.2 % — SIGNIFICANT CHANGE UP (ref 10.3–14.5)
RBC CASTS # UR COMP ASSIST: NEGATIVE /HPF — SIGNIFICANT CHANGE UP (ref 0–4)
RSV RNA NPH QL NAA+NON-PROBE: SIGNIFICANT CHANGE UP
SARS-COV-2 RNA SPEC QL NAA+PROBE: SIGNIFICANT CHANGE UP
SARS-COV-2 RNA SPEC QL NAA+PROBE: SIGNIFICANT CHANGE UP
SODIUM SERPL-SCNC: 137 MMOL/L — SIGNIFICANT CHANGE UP (ref 135–145)
SODIUM SERPL-SCNC: 138 MMOL/L — SIGNIFICANT CHANGE UP (ref 135–145)
SP GR SPEC: 1.02 — SIGNIFICANT CHANGE UP (ref 1.01–1.02)
UROBILINOGEN FLD QL: NEGATIVE — SIGNIFICANT CHANGE UP
WBC # BLD: 10.75 K/UL — HIGH (ref 3.8–10.5)
WBC # BLD: 9.81 K/UL — SIGNIFICANT CHANGE UP (ref 3.8–10.5)
WBC # FLD AUTO: 10.75 K/UL — HIGH (ref 3.8–10.5)
WBC # FLD AUTO: 9.81 K/UL — SIGNIFICANT CHANGE UP (ref 3.8–10.5)
WBC UR QL: ABNORMAL /HPF (ref 0–5)

## 2023-01-17 PROCEDURE — 85027 COMPLETE CBC AUTOMATED: CPT

## 2023-01-17 PROCEDURE — 99223 1ST HOSP IP/OBS HIGH 75: CPT

## 2023-01-17 PROCEDURE — 87070 CULTURE OTHR SPECIMN AEROBIC: CPT

## 2023-01-17 PROCEDURE — 86900 BLOOD TYPING SEROLOGIC ABO: CPT

## 2023-01-17 PROCEDURE — 83605 ASSAY OF LACTIC ACID: CPT

## 2023-01-17 PROCEDURE — C1751: CPT

## 2023-01-17 PROCEDURE — 72131 CT LUMBAR SPINE W/O DYE: CPT | Mod: MG

## 2023-01-17 PROCEDURE — 85652 RBC SED RATE AUTOMATED: CPT

## 2023-01-17 PROCEDURE — 97530 THERAPEUTIC ACTIVITIES: CPT | Mod: GP

## 2023-01-17 PROCEDURE — 71045 X-RAY EXAM CHEST 1 VIEW: CPT

## 2023-01-17 PROCEDURE — 87102 FUNGUS ISOLATION CULTURE: CPT

## 2023-01-17 PROCEDURE — 97163 PT EVAL HIGH COMPLEX 45 MIN: CPT | Mod: GP

## 2023-01-17 PROCEDURE — 81001 URINALYSIS AUTO W/SCOPE: CPT

## 2023-01-17 PROCEDURE — 80202 ASSAY OF VANCOMYCIN: CPT

## 2023-01-17 PROCEDURE — 86901 BLOOD TYPING SEROLOGIC RH(D): CPT

## 2023-01-17 PROCEDURE — 88304 TISSUE EXAM BY PATHOLOGIST: CPT | Mod: 26

## 2023-01-17 PROCEDURE — 87075 CULTR BACTERIA EXCEPT BLOOD: CPT

## 2023-01-17 PROCEDURE — 93005 ELECTROCARDIOGRAM TRACING: CPT

## 2023-01-17 PROCEDURE — 88304 TISSUE EXAM BY PATHOLOGIST: CPT

## 2023-01-17 PROCEDURE — 83690 ASSAY OF LIPASE: CPT

## 2023-01-17 PROCEDURE — 86803 HEPATITIS C AB TEST: CPT

## 2023-01-17 PROCEDURE — 36569 INSJ PICC 5 YR+ W/O IMAGING: CPT

## 2023-01-17 PROCEDURE — G1004: CPT

## 2023-01-17 PROCEDURE — 97116 GAIT TRAINING THERAPY: CPT | Mod: GP

## 2023-01-17 PROCEDURE — 87086 URINE CULTURE/COLONY COUNT: CPT

## 2023-01-17 PROCEDURE — 85730 THROMBOPLASTIN TIME PARTIAL: CPT

## 2023-01-17 PROCEDURE — 72131 CT LUMBAR SPINE W/O DYE: CPT | Mod: 26

## 2023-01-17 PROCEDURE — 85610 PROTHROMBIN TIME: CPT

## 2023-01-17 PROCEDURE — 71045 X-RAY EXAM CHEST 1 VIEW: CPT | Mod: 26

## 2023-01-17 PROCEDURE — 36415 COLL VENOUS BLD VENIPUNCTURE: CPT

## 2023-01-17 PROCEDURE — 85025 COMPLETE CBC W/AUTO DIFF WBC: CPT

## 2023-01-17 PROCEDURE — 87186 SC STD MICRODIL/AGAR DIL: CPT

## 2023-01-17 PROCEDURE — 86140 C-REACTIVE PROTEIN: CPT

## 2023-01-17 PROCEDURE — 0225U NFCT DS DNA&RNA 21 SARSCOV2: CPT

## 2023-01-17 PROCEDURE — 86850 RBC ANTIBODY SCREEN: CPT

## 2023-01-17 PROCEDURE — 84702 CHORIONIC GONADOTROPIN TEST: CPT

## 2023-01-17 PROCEDURE — 80048 BASIC METABOLIC PNL TOTAL CA: CPT

## 2023-01-17 PROCEDURE — 93010 ELECTROCARDIOGRAM REPORT: CPT

## 2023-01-17 PROCEDURE — 87040 BLOOD CULTURE FOR BACTERIA: CPT

## 2023-01-17 PROCEDURE — 0241U: CPT

## 2023-01-17 RX ORDER — GABAPENTIN 400 MG/1
3 CAPSULE ORAL
Qty: 0 | Refills: 0 | DISCHARGE

## 2023-01-17 RX ORDER — SODIUM CHLORIDE 9 MG/ML
1000 INJECTION, SOLUTION INTRAVENOUS
Refills: 0 | Status: DISCONTINUED | OUTPATIENT
Start: 2023-01-17 | End: 2023-01-22

## 2023-01-17 RX ORDER — INFLUENZA VIRUS VACCINE 15; 15; 15; 15 UG/.5ML; UG/.5ML; UG/.5ML; UG/.5ML
0.5 SUSPENSION INTRAMUSCULAR ONCE
Refills: 0 | Status: DISCONTINUED | OUTPATIENT
Start: 2023-01-17 | End: 2023-01-22

## 2023-01-17 RX ORDER — CYCLOBENZAPRINE HYDROCHLORIDE 10 MG/1
1 TABLET, FILM COATED ORAL
Qty: 0 | Refills: 0 | DISCHARGE

## 2023-01-17 RX ORDER — SENNA PLUS 8.6 MG/1
2 TABLET ORAL AT BEDTIME
Refills: 0 | Status: DISCONTINUED | OUTPATIENT
Start: 2023-01-17 | End: 2023-01-22

## 2023-01-17 RX ORDER — HYDROCORTISONE 20 MG
50 TABLET ORAL ONCE
Refills: 0 | Status: COMPLETED | OUTPATIENT
Start: 2023-01-17 | End: 2023-01-17

## 2023-01-17 RX ORDER — AMLODIPINE BESYLATE 2.5 MG/1
10 TABLET ORAL DAILY
Refills: 0 | Status: DISCONTINUED | OUTPATIENT
Start: 2023-01-17 | End: 2023-01-22

## 2023-01-17 RX ORDER — CYCLOBENZAPRINE HYDROCHLORIDE 10 MG/1
10 TABLET, FILM COATED ORAL EVERY 8 HOURS
Refills: 0 | Status: DISCONTINUED | OUTPATIENT
Start: 2023-01-17 | End: 2023-01-22

## 2023-01-17 RX ORDER — MONTELUKAST 4 MG/1
1 TABLET, CHEWABLE ORAL
Qty: 0 | Refills: 0 | DISCHARGE

## 2023-01-17 RX ORDER — MONTELUKAST 4 MG/1
10 TABLET, CHEWABLE ORAL AT BEDTIME
Refills: 0 | Status: DISCONTINUED | OUTPATIENT
Start: 2023-01-17 | End: 2023-01-22

## 2023-01-17 RX ORDER — HYDROCODONE BITARTRATE AND ACETAMINOPHEN 7.5; 325 MG/15ML; MG/15ML
1 SOLUTION ORAL
Qty: 0 | Refills: 0 | DISCHARGE

## 2023-01-17 RX ORDER — DIAZEPAM 5 MG
5 TABLET ORAL EVERY 12 HOURS
Refills: 0 | Status: DISCONTINUED | OUTPATIENT
Start: 2023-01-17 | End: 2023-01-22

## 2023-01-17 RX ORDER — HYDROXYCHLOROQUINE SULFATE 200 MG
1 TABLET ORAL
Qty: 0 | Refills: 0 | DISCHARGE

## 2023-01-17 RX ORDER — FLUOXETINE HCL 10 MG
40 CAPSULE ORAL DAILY
Refills: 0 | Status: DISCONTINUED | OUTPATIENT
Start: 2023-01-17 | End: 2023-01-22

## 2023-01-17 RX ORDER — CEFAZOLIN SODIUM 1 G
2000 VIAL (EA) INJECTION EVERY 8 HOURS
Refills: 0 | Status: DISCONTINUED | OUTPATIENT
Start: 2023-01-17 | End: 2023-01-18

## 2023-01-17 RX ORDER — GABAPENTIN 400 MG/1
2 CAPSULE ORAL
Qty: 0 | Refills: 0 | DISCHARGE

## 2023-01-17 RX ORDER — CEFAZOLIN SODIUM 1 G
2000 VIAL (EA) INJECTION EVERY 8 HOURS
Refills: 0 | Status: DISCONTINUED | OUTPATIENT
Start: 2023-01-17 | End: 2023-01-17

## 2023-01-17 RX ORDER — ALBUTEROL 90 UG/1
2 AEROSOL, METERED ORAL
Qty: 0 | Refills: 0 | DISCHARGE

## 2023-01-17 RX ORDER — OXYCODONE HYDROCHLORIDE 5 MG/1
10 TABLET ORAL EVERY 4 HOURS
Refills: 0 | Status: DISCONTINUED | OUTPATIENT
Start: 2023-01-17 | End: 2023-01-22

## 2023-01-17 RX ORDER — ALBUTEROL 90 UG/1
2 AEROSOL, METERED ORAL EVERY 6 HOURS
Refills: 0 | Status: DISCONTINUED | OUTPATIENT
Start: 2023-01-17 | End: 2023-01-22

## 2023-01-17 RX ORDER — OXYCODONE HYDROCHLORIDE 5 MG/1
10 TABLET ORAL ONCE
Refills: 0 | Status: DISCONTINUED | OUTPATIENT
Start: 2023-01-17 | End: 2023-01-18

## 2023-01-17 RX ORDER — FLUOXETINE HCL 10 MG
1 CAPSULE ORAL
Qty: 0 | Refills: 0 | DISCHARGE

## 2023-01-17 RX ORDER — MORPHINE SULFATE 50 MG/1
4 CAPSULE, EXTENDED RELEASE ORAL
Refills: 0 | Status: DISCONTINUED | OUTPATIENT
Start: 2023-01-17 | End: 2023-01-22

## 2023-01-17 RX ORDER — LEFLUNOMIDE 10 MG/1
1 TABLET ORAL
Qty: 0 | Refills: 0 | DISCHARGE

## 2023-01-17 RX ORDER — OXYCODONE HYDROCHLORIDE 5 MG/1
5 TABLET ORAL ONCE
Refills: 0 | Status: DISCONTINUED | OUTPATIENT
Start: 2023-01-17 | End: 2023-01-17

## 2023-01-17 RX ORDER — ONDANSETRON 8 MG/1
4 TABLET, FILM COATED ORAL ONCE
Refills: 0 | Status: DISCONTINUED | OUTPATIENT
Start: 2023-01-17 | End: 2023-01-17

## 2023-01-17 RX ORDER — FENTANYL CITRATE 50 UG/ML
50 INJECTION INTRAVENOUS
Refills: 0 | Status: DISCONTINUED | OUTPATIENT
Start: 2023-01-17 | End: 2023-01-17

## 2023-01-17 RX ORDER — AMLODIPINE BESYLATE 2.5 MG/1
1 TABLET ORAL
Qty: 0 | Refills: 0 | DISCHARGE

## 2023-01-17 RX ORDER — LISINOPRIL 2.5 MG/1
1 TABLET ORAL
Qty: 0 | Refills: 0 | DISCHARGE

## 2023-01-17 RX ORDER — CLONAZEPAM 1 MG
0.5 TABLET ORAL
Refills: 0 | Status: DISCONTINUED | OUTPATIENT
Start: 2023-01-17 | End: 2023-01-22

## 2023-01-17 RX ORDER — CLONAZEPAM 1 MG
1 TABLET ORAL
Qty: 0 | Refills: 0 | DISCHARGE

## 2023-01-17 RX ORDER — GABAPENTIN 400 MG/1
600 CAPSULE ORAL THREE TIMES A DAY
Refills: 0 | Status: DISCONTINUED | OUTPATIENT
Start: 2023-01-17 | End: 2023-01-22

## 2023-01-17 RX ORDER — DIPHENHYDRAMINE HCL 50 MG
12.5 CAPSULE ORAL EVERY 4 HOURS
Refills: 0 | Status: DISCONTINUED | OUTPATIENT
Start: 2023-01-17 | End: 2023-01-22

## 2023-01-17 RX ORDER — SODIUM CHLORIDE 9 MG/ML
1000 INJECTION, SOLUTION INTRAVENOUS
Refills: 0 | Status: DISCONTINUED | OUTPATIENT
Start: 2023-01-17 | End: 2023-01-17

## 2023-01-17 RX ORDER — CYCLOBENZAPRINE HYDROCHLORIDE 10 MG/1
10 TABLET, FILM COATED ORAL THREE TIMES A DAY
Refills: 0 | Status: DISCONTINUED | OUTPATIENT
Start: 2023-01-17 | End: 2023-01-17

## 2023-01-17 RX ORDER — ACETAMINOPHEN 500 MG
650 TABLET ORAL EVERY 6 HOURS
Refills: 0 | Status: DISCONTINUED | OUTPATIENT
Start: 2023-01-17 | End: 2023-01-22

## 2023-01-17 RX ORDER — LISINOPRIL 2.5 MG/1
10 TABLET ORAL EVERY 12 HOURS
Refills: 0 | Status: DISCONTINUED | OUTPATIENT
Start: 2023-01-17 | End: 2023-01-22

## 2023-01-17 RX ADMIN — Medication 20 MILLIGRAM(S): at 12:35

## 2023-01-17 RX ADMIN — FENTANYL CITRATE 50 MICROGRAM(S): 50 INJECTION INTRAVENOUS at 17:01

## 2023-01-17 RX ADMIN — LISINOPRIL 10 MILLIGRAM(S): 2.5 TABLET ORAL at 21:18

## 2023-01-17 RX ADMIN — OXYCODONE HYDROCHLORIDE 10 MILLIGRAM(S): 5 TABLET ORAL at 17:37

## 2023-01-17 RX ADMIN — GABAPENTIN 600 MILLIGRAM(S): 400 CAPSULE ORAL at 05:50

## 2023-01-17 RX ADMIN — HYDROMORPHONE HYDROCHLORIDE 0.5 MILLIGRAM(S): 2 INJECTION INTRAMUSCULAR; INTRAVENOUS; SUBCUTANEOUS at 05:13

## 2023-01-17 RX ADMIN — Medication 100 MILLIGRAM(S): at 05:50

## 2023-01-17 RX ADMIN — HYDROMORPHONE HYDROCHLORIDE 0.5 MILLIGRAM(S): 2 INJECTION INTRAMUSCULAR; INTRAVENOUS; SUBCUTANEOUS at 04:19

## 2023-01-17 RX ADMIN — Medication 50 MILLIGRAM(S): at 14:38

## 2023-01-17 RX ADMIN — OXYCODONE HYDROCHLORIDE 10 MILLIGRAM(S): 5 TABLET ORAL at 17:02

## 2023-01-17 RX ADMIN — MORPHINE SULFATE 4 MILLIGRAM(S): 50 CAPSULE, EXTENDED RELEASE ORAL at 21:16

## 2023-01-17 RX ADMIN — GABAPENTIN 600 MILLIGRAM(S): 400 CAPSULE ORAL at 21:18

## 2023-01-17 RX ADMIN — FENTANYL CITRATE 50 MICROGRAM(S): 50 INJECTION INTRAVENOUS at 17:38

## 2023-01-17 RX ADMIN — Medication 40 MILLIGRAM(S): at 09:53

## 2023-01-17 RX ADMIN — SENNA PLUS 2 TABLET(S): 8.6 TABLET ORAL at 21:18

## 2023-01-17 RX ADMIN — SODIUM CHLORIDE 100 MILLILITER(S): 9 INJECTION, SOLUTION INTRAVENOUS at 21:19

## 2023-01-17 RX ADMIN — SODIUM CHLORIDE 125 MILLILITER(S): 9 INJECTION, SOLUTION INTRAVENOUS at 16:59

## 2023-01-17 RX ADMIN — LISINOPRIL 10 MILLIGRAM(S): 2.5 TABLET ORAL at 09:53

## 2023-01-17 RX ADMIN — HYDROMORPHONE HYDROCHLORIDE 0.5 MILLIGRAM(S): 2 INJECTION INTRAMUSCULAR; INTRAVENOUS; SUBCUTANEOUS at 10:43

## 2023-01-17 RX ADMIN — AMLODIPINE BESYLATE 10 MILLIGRAM(S): 2.5 TABLET ORAL at 09:54

## 2023-01-17 RX ADMIN — HYDROMORPHONE HYDROCHLORIDE 0.5 MILLIGRAM(S): 2 INJECTION INTRAMUSCULAR; INTRAVENOUS; SUBCUTANEOUS at 09:53

## 2023-01-17 RX ADMIN — GABAPENTIN 600 MILLIGRAM(S): 400 CAPSULE ORAL at 13:06

## 2023-01-17 RX ADMIN — Medication 650 MILLIGRAM(S): at 06:15

## 2023-01-17 RX ADMIN — Medication 650 MILLIGRAM(S): at 05:50

## 2023-01-17 RX ADMIN — MONTELUKAST 10 MILLIGRAM(S): 4 TABLET, CHEWABLE ORAL at 21:19

## 2023-01-17 RX ADMIN — Medication 100 MILLIGRAM(S): at 13:07

## 2023-01-17 RX ADMIN — CYCLOBENZAPRINE HYDROCHLORIDE 10 MILLIGRAM(S): 10 TABLET, FILM COATED ORAL at 21:18

## 2023-01-17 RX ADMIN — MORPHINE SULFATE 4 MILLIGRAM(S): 50 CAPSULE, EXTENDED RELEASE ORAL at 21:31

## 2023-01-17 NOTE — PATIENT PROFILE ADULT - FALL HARM RISK - RISK INTERVENTIONS

## 2023-01-17 NOTE — H&P ADULT - HISTORY OF PRESENT ILLNESS
Orthopedics    Patient is a 57yFemale who presents to  ED w/ a c/o of 2 weeks of drainage from her surgical site s/p L2-5 lumbar lami/fusion with Dr Schaefer 12/2/22. Patient states she noticed drainage and now burning pain at her surigcal site , Denies Fevers/chills/CP/SOB/palpitations/N/v/Headache/confusion/dizziness/weakness/fatigue. States ability to walk. Denies any bowel/bladder incontinence. Endorses BUE paresthesia which are always present. Denies having any other pain elsewhere. No other orthopedic concerns at this time.    Lumbar stenosis    Spondylolisthesis    HTN (hypertension)    Peripheral edema    Rheumatoid arthritis    Anxiety and depression    Asthma    Lower back pain    Osteoarthritis of both knees    Joint pain    Obesity    Seasonal allergies    Prolapse of female pelvic organs    Eczema    2019 novel coronavirus disease (COVID-19)            Levaquin (Hives; Flushing)      PHYSICAL EXAM:  T(C): 36.4 (01-16-23 @ 23:33), Max: 36.4 (01-16-23 @ 23:33)  HR: 93 (01-16-23 @ 23:33) (93 - 93)  BP: 144/78 (01-16-23 @ 23:33) (144/78 - 144/78)  RR: 17 (01-16-23 @ 23:33) (17 - 17)  SpO2: 97% (01-16-23 @ 23:33) (97% - 97%)    GEN: NAD, AAOx3    SPINE:  Skin with surgical wound dehiscence in central aspect of incision. Purulent drainage  TTP over the surgical site  No bony step-offs   Grossly moving all extremities  + Median/Radial/Ulnar/Musculocutaneous/Axillary nerves intact  + Hip Flexors/Quads/Hamstrings/TA/EHL/FHL/GS  SILT C5-T1  SILT L2-S1  + Radial Pulse  + DP/PT Pulses  No saddle anesthesia    Motor:                          Deltoid       Biceps      Triceps      Wrist Ext      Finger Flex    Finger Abduction   RIGHT             5/5             5/5             5/5             5/5                 5/5                     5/5  LEFT                5/5             5/5             5/5             5/5                 5/5                     5/5                          Hip Flex       Knee Ext        Knee Flex     Dorsiflex      Hallux Ext        PlantarFlex  RIGHT            5/5                5/5                 5/5               5/5                 5/5                    5/5  LEFT               4+/5                5/5                 5/5               5/5                 5/5                    5/5      Sensory:                      C5      C6      C7      C8       T1          RIGHT          2         2        2         2         2          (0=absent, 1=impaired, 2=normal, NT=not testable)  LEFT             2         2        2         2         2          (0=absent, 1=impaired, 2=normal, NT=not testable)                        L2        L3       L4      L5       S1          RIGHT        2          2         2        2        2           (0=absent, 1=impaired, 2=normal, NT=not testable)  LEFT           2          2         2        2        2           (0=absent, 1=impaired, 2=normal, NT=not testable)    Negative Ro's sign bilaterally  Negative Babinski bilaterally   Negative Myoclonus bilaterally      Secondary Survey:   No TTP over bony prominences, SILT, palpable pulses, full/painless A/PROM, compartments soft. No TTP over spinous processes or paraspinal muscles at C/T/L spine. No palpable step off. No other injuries or complaints.        A/P: 57F w/ Surgical site infection s/p L2-5 Lami/PLIF 12/2/22    Plan:  -Admit to ortho spine  -FU Blood culutres/labs  -IV ancef q8h  -Plan for possible surgical intervention 1/17 I+D  -Preop labs/imaging: CBC/BMP/PT/PTT/INR/T&S/Covid/CXR/EKG/HCG  -NPO except meds/IVFs while NPO.  -WBAT  -Hold chem DVT ppx  -Pain control prn  -Medical management, continue home meds.  -FU Med C/s for medical optimization  -Case discussed with attending, will advise if plan changes.

## 2023-01-17 NOTE — ED ADULT NURSE NOTE - OBJECTIVE STATEMENT
pt presents to the ED with c/o post surgical issues/ possible infection. pt reports that she had multiple surgeries within the last year - however her most recent one was back surgery with instrumentation. pt reports she told her surgeon she believed something was wrong with the site but was 'dismissed'. pt has noticable opening in her back that is covered by a piece of gauze that she changes. pt reports no current pain in the area but there is discomfort there from time to time. pt A&Ox4 and reports no other complaints at this time and knows she is here for admission.

## 2023-01-17 NOTE — CONSULT NOTE ADULT - ASSESSMENT
56 y/o F w/ PMH of anxiety, depression, HTN, lumbar stensois, obesity, OA, RA , p/w surgical site infection    *Surgical site infeciton s/p L2-5 Lami/PLIF on 12/2/22  -IV abx   -ID consult   -F/u cultures   -Patient admitted to ortho service, w/ plan for possible I&D on 1/17  -NPO after MN   -EKG:  -As per RCRI criteria patient has 0 points and is a Class I risk, low risk for major cardiac complications in perioperative period for intermediate risk surgery.     *RA  -Patient states she was told by her ortho to hold off on prednisone / leflunomide / plaquenil, so patient has not taken meds in 3 days    *H/o anxiety / depression / HTN / lumbar stenosis / obesity / OA   -C/w home meds and f/u outpatient for further management if conditions remain stable during hospitalization     *DVT ppx   -SCDs 2/2 OR    56 y/o F w/ PMH of anxiety, depression, HTN, lumbar stensois, obesity, OA, RA , p/w surgical site infection    *Surgical site infeciton s/p L2-5 Lami/PLIF on 12/2/22  -IV abx   -ID consult   -F/u cultures   -Patient admitted to ortho service, w/ plan for possible I&D on 1/17  -NPO after MN   -EKG: NSR 91 bpm   -As per RCRI criteria patient has 0 points and is a Class I risk, low risk for major cardiac complications in perioperative period for intermediate risk surgery.     *RA  -Patient states she was told by her ortho to hold off on prednisone / leflunomide / plaquenil, so patient has not taken meds in 3 days    *H/o anxiety / depression / HTN / lumbar stenosis / obesity / OA   -C/w home meds and f/u outpatient for further management if conditions remain stable during hospitalization     *DVT ppx   -SCDs 2/2 OR

## 2023-01-18 LAB
ANION GAP SERPL CALC-SCNC: 6 MMOL/L — SIGNIFICANT CHANGE UP (ref 5–17)
BASOPHILS # BLD AUTO: 0.02 K/UL — SIGNIFICANT CHANGE UP (ref 0–0.2)
BASOPHILS NFR BLD AUTO: 0.3 % — SIGNIFICANT CHANGE UP (ref 0–2)
BUN SERPL-MCNC: 19 MG/DL — SIGNIFICANT CHANGE UP (ref 7–23)
CALCIUM SERPL-MCNC: 8.8 MG/DL — SIGNIFICANT CHANGE UP (ref 8.5–10.1)
CHLORIDE SERPL-SCNC: 106 MMOL/L — SIGNIFICANT CHANGE UP (ref 96–108)
CO2 SERPL-SCNC: 25 MMOL/L — SIGNIFICANT CHANGE UP (ref 22–31)
CREAT SERPL-MCNC: 0.5 MG/DL — SIGNIFICANT CHANGE UP (ref 0.5–1.3)
CULTURE RESULTS: SIGNIFICANT CHANGE UP
EGFR: 108 ML/MIN/1.73M2 — SIGNIFICANT CHANGE UP
EOSINOPHIL # BLD AUTO: 0.09 K/UL — SIGNIFICANT CHANGE UP (ref 0–0.5)
EOSINOPHIL NFR BLD AUTO: 1.3 % — SIGNIFICANT CHANGE UP (ref 0–6)
GLUCOSE SERPL-MCNC: 122 MG/DL — HIGH (ref 70–99)
HCT VFR BLD CALC: 33.1 % — LOW (ref 34.5–45)
HGB BLD-MCNC: 10.5 G/DL — LOW (ref 11.5–15.5)
IMM GRANULOCYTES NFR BLD AUTO: 0.3 % — SIGNIFICANT CHANGE UP (ref 0–0.9)
LYMPHOCYTES # BLD AUTO: 1.57 K/UL — SIGNIFICANT CHANGE UP (ref 1–3.3)
LYMPHOCYTES # BLD AUTO: 22.2 % — SIGNIFICANT CHANGE UP (ref 13–44)
MCHC RBC-ENTMCNC: 27.9 PG — SIGNIFICANT CHANGE UP (ref 27–34)
MCHC RBC-ENTMCNC: 31.7 GM/DL — LOW (ref 32–36)
MCV RBC AUTO: 88 FL — SIGNIFICANT CHANGE UP (ref 80–100)
MONOCYTES # BLD AUTO: 0.66 K/UL — SIGNIFICANT CHANGE UP (ref 0–0.9)
MONOCYTES NFR BLD AUTO: 9.3 % — SIGNIFICANT CHANGE UP (ref 2–14)
NEUTROPHILS # BLD AUTO: 4.71 K/UL — SIGNIFICANT CHANGE UP (ref 1.8–7.4)
NEUTROPHILS NFR BLD AUTO: 66.6 % — SIGNIFICANT CHANGE UP (ref 43–77)
PLATELET # BLD AUTO: 194 K/UL — SIGNIFICANT CHANGE UP (ref 150–400)
POTASSIUM SERPL-MCNC: 3.9 MMOL/L — SIGNIFICANT CHANGE UP (ref 3.5–5.3)
POTASSIUM SERPL-SCNC: 3.9 MMOL/L — SIGNIFICANT CHANGE UP (ref 3.5–5.3)
RBC # BLD: 3.76 M/UL — LOW (ref 3.8–5.2)
RBC # FLD: 14 % — SIGNIFICANT CHANGE UP (ref 10.3–14.5)
SODIUM SERPL-SCNC: 137 MMOL/L — SIGNIFICANT CHANGE UP (ref 135–145)
SPECIMEN SOURCE: SIGNIFICANT CHANGE UP
WBC # BLD: 7.07 K/UL — SIGNIFICANT CHANGE UP (ref 3.8–10.5)
WBC # FLD AUTO: 7.07 K/UL — SIGNIFICANT CHANGE UP (ref 3.8–10.5)

## 2023-01-18 PROCEDURE — 99233 SBSQ HOSP IP/OBS HIGH 50: CPT

## 2023-01-18 RX ORDER — HYDROCORTISONE 20 MG
25 TABLET ORAL EVERY 8 HOURS
Refills: 0 | Status: COMPLETED | OUTPATIENT
Start: 2023-01-18 | End: 2023-01-19

## 2023-01-18 RX ORDER — VANCOMYCIN HCL 1 G
1000 VIAL (EA) INTRAVENOUS EVERY 12 HOURS
Refills: 0 | Status: DISCONTINUED | OUTPATIENT
Start: 2023-01-18 | End: 2023-01-20

## 2023-01-18 RX ORDER — CEFTRIAXONE 500 MG/1
2000 INJECTION, POWDER, FOR SOLUTION INTRAMUSCULAR; INTRAVENOUS EVERY 24 HOURS
Refills: 0 | Status: DISCONTINUED | OUTPATIENT
Start: 2023-01-18 | End: 2023-01-20

## 2023-01-18 RX ORDER — LACTOBACILLUS ACIDOPHILUS 100MM CELL
1 CAPSULE ORAL DAILY
Refills: 0 | Status: DISCONTINUED | OUTPATIENT
Start: 2023-01-18 | End: 2023-01-22

## 2023-01-18 RX ADMIN — CEFTRIAXONE 2000 MILLIGRAM(S): 500 INJECTION, POWDER, FOR SOLUTION INTRAMUSCULAR; INTRAVENOUS at 07:58

## 2023-01-18 RX ADMIN — Medication 250 MILLIGRAM(S): at 10:01

## 2023-01-18 RX ADMIN — Medication 1 TABLET(S): at 17:18

## 2023-01-18 RX ADMIN — OXYCODONE HYDROCHLORIDE 10 MILLIGRAM(S): 5 TABLET ORAL at 15:35

## 2023-01-18 RX ADMIN — Medication 250 MILLIGRAM(S): at 21:36

## 2023-01-18 RX ADMIN — Medication 25 MILLIGRAM(S): at 10:02

## 2023-01-18 RX ADMIN — SENNA PLUS 2 TABLET(S): 8.6 TABLET ORAL at 21:35

## 2023-01-18 RX ADMIN — Medication 40 MILLIGRAM(S): at 10:01

## 2023-01-18 RX ADMIN — Medication 650 MILLIGRAM(S): at 23:34

## 2023-01-18 RX ADMIN — Medication 650 MILLIGRAM(S): at 13:04

## 2023-01-18 RX ADMIN — Medication 5 MILLIGRAM(S): at 07:59

## 2023-01-18 RX ADMIN — LISINOPRIL 10 MILLIGRAM(S): 2.5 TABLET ORAL at 21:35

## 2023-01-18 RX ADMIN — Medication 100 MILLIGRAM(S): at 00:27

## 2023-01-18 RX ADMIN — GABAPENTIN 600 MILLIGRAM(S): 400 CAPSULE ORAL at 13:04

## 2023-01-18 RX ADMIN — Medication 650 MILLIGRAM(S): at 23:10

## 2023-01-18 RX ADMIN — MORPHINE SULFATE 4 MILLIGRAM(S): 50 CAPSULE, EXTENDED RELEASE ORAL at 01:02

## 2023-01-18 RX ADMIN — Medication 5 MILLIGRAM(S): at 21:36

## 2023-01-18 RX ADMIN — MORPHINE SULFATE 4 MILLIGRAM(S): 50 CAPSULE, EXTENDED RELEASE ORAL at 00:32

## 2023-01-18 RX ADMIN — Medication 25 MILLIGRAM(S): at 17:18

## 2023-01-18 RX ADMIN — Medication 650 MILLIGRAM(S): at 17:18

## 2023-01-18 RX ADMIN — GABAPENTIN 600 MILLIGRAM(S): 400 CAPSULE ORAL at 06:10

## 2023-01-18 RX ADMIN — CYCLOBENZAPRINE HYDROCHLORIDE 10 MILLIGRAM(S): 10 TABLET, FILM COATED ORAL at 06:10

## 2023-01-18 RX ADMIN — OXYCODONE HYDROCHLORIDE 10 MILLIGRAM(S): 5 TABLET ORAL at 06:09

## 2023-01-18 RX ADMIN — Medication 650 MILLIGRAM(S): at 06:10

## 2023-01-18 RX ADMIN — LISINOPRIL 10 MILLIGRAM(S): 2.5 TABLET ORAL at 10:01

## 2023-01-18 RX ADMIN — OXYCODONE HYDROCHLORIDE 10 MILLIGRAM(S): 5 TABLET ORAL at 10:00

## 2023-01-18 RX ADMIN — Medication 650 MILLIGRAM(S): at 06:40

## 2023-01-18 RX ADMIN — HYDROMORPHONE HYDROCHLORIDE 0.5 MILLIGRAM(S): 2 INJECTION INTRAMUSCULAR; INTRAVENOUS; SUBCUTANEOUS at 21:50

## 2023-01-18 RX ADMIN — AMLODIPINE BESYLATE 10 MILLIGRAM(S): 2.5 TABLET ORAL at 10:00

## 2023-01-18 RX ADMIN — GABAPENTIN 600 MILLIGRAM(S): 400 CAPSULE ORAL at 21:36

## 2023-01-18 RX ADMIN — OXYCODONE HYDROCHLORIDE 10 MILLIGRAM(S): 5 TABLET ORAL at 06:36

## 2023-01-18 RX ADMIN — HYDROMORPHONE HYDROCHLORIDE 0.5 MILLIGRAM(S): 2 INJECTION INTRAMUSCULAR; INTRAVENOUS; SUBCUTANEOUS at 21:35

## 2023-01-18 RX ADMIN — Medication 650 MILLIGRAM(S): at 00:29

## 2023-01-18 RX ADMIN — OXYCODONE HYDROCHLORIDE 10 MILLIGRAM(S): 5 TABLET ORAL at 10:30

## 2023-01-18 RX ADMIN — MONTELUKAST 10 MILLIGRAM(S): 4 TABLET, CHEWABLE ORAL at 21:35

## 2023-01-18 RX ADMIN — Medication 1 TABLET(S): at 10:01

## 2023-01-18 RX ADMIN — Medication 650 MILLIGRAM(S): at 00:59

## 2023-01-18 RX ADMIN — SODIUM CHLORIDE 100 MILLILITER(S): 9 INJECTION, SOLUTION INTRAVENOUS at 21:36

## 2023-01-18 RX ADMIN — OXYCODONE HYDROCHLORIDE 10 MILLIGRAM(S): 5 TABLET ORAL at 15:05

## 2023-01-18 RX ADMIN — CYCLOBENZAPRINE HYDROCHLORIDE 10 MILLIGRAM(S): 10 TABLET, FILM COATED ORAL at 21:36

## 2023-01-18 RX ADMIN — CYCLOBENZAPRINE HYDROCHLORIDE 10 MILLIGRAM(S): 10 TABLET, FILM COATED ORAL at 13:04

## 2023-01-18 NOTE — PROGRESS NOTE ADULT - SUBJECTIVE AND OBJECTIVE BOX
HPI  Patient is a 57y Female who presents to  ED w/ a c/o of 2 weeks of drainage from her surgical site s/p L2-5 lumbar lami/fusion with Dr Schaefer 12/2/22. Patient states she noticed drainage and now burning pain at her surigcal site , Denies Fevers/chills/CP/SOB/palpitations/N/v/Headache/confusion/dizziness/weakness/fatigue. States ability to walk. Denies any bowel/bladder incontinence. Endorses BUE paresthesia which are always present. Denies having any other pain elsewhere. No other orthopedic concerns at this time.    1/17/23 Patient underwent I&D of superficial wound infection-contained to extrafascial plane  1/17/23 Patient comfortable. IVAB completed-awaiting results    PMH/PSH:  Lumbar stenosis    Spondylolisthesis    HTN (hypertension)    Peripheral edema    Rheumatoid arthritis    Anxiety and depression    Asthma    Lower back pain    Osteoarthritis of both knees    Joint pain    Obesity    Seasonal allergies    Prolapse of female pelvic organs    Eczema    2019 novel coronavirus disease (COVID-19)    MEDICATIONS  (STANDING):  acetaminophen     Tablet .. 650 milliGRAM(s) Oral every 6 hours  amLODIPine   Tablet 10 milliGRAM(s) Oral daily  cefTRIAXone Injectable. 2000 milliGRAM(s) IV Push every 24 hours  cyclobenzaprine 10 milliGRAM(s) Oral every 8 hours  FLUoxetine 40 milliGRAM(s) Oral daily  gabapentin 600 milliGRAM(s) Oral three times a day  hydrocortisone sodium succinate Injectable 25 milliGRAM(s) IV Push every 8 hours  influenza   Vaccine 0.5 milliLiter(s) IntraMuscular once  lactated ringers. 1000 milliLiter(s) (75 mL/Hr) IV Continuous <Continuous>  lactated ringers. 1000 milliLiter(s) (100 mL/Hr) IV Continuous <Continuous>  lisinopril 10 milliGRAM(s) Oral every 12 hours  montelukast 10 milliGRAM(s) Oral at bedtime  multivitamin 1 Tablet(s) Oral daily  senna 2 Tablet(s) Oral at bedtime  vancomycin  IVPB 1000 milliGRAM(s) IV Intermittent every 12 hours    MEDICATIONS  (PRN):  acetaminophen     Tablet .. 650 milliGRAM(s) Oral every 6 hours PRN Temp greater or equal to 38.5C (101.3F), Mild Pain (1 - 3)  albuterol    90 MICROgram(s) HFA Inhaler 2 Puff(s) Inhalation every 6 hours PRN for shortness of breath and/or wheezing  clonazePAM  Tablet 0.5 milliGRAM(s) Oral two times a day PRN anxiety  diazepam    Tablet 5 milliGRAM(s) Oral every 12 hours PRN muscle spasm  diphenhydrAMINE Injectable 12.5 milliGRAM(s) IV Push every 4 hours PRN Itching  HYDROmorphone  Injectable 0.5 milliGRAM(s) IV Push every 6 hours PRN Severe Pain (7 - 10)  morphine  - Injectable 4 milliGRAM(s) IV Push every 3 hours PRN Severe Pain (7 - 10)  oxyCODONE    IR 10 milliGRAM(s) Oral every 4 hours PRN Moderate Pain (4 - 6)  oxyCODONE    IR 5 milliGRAM(s) Oral every 4 hours PRN Mild Pain (1 - 3)  oxyCODONE    IR 10 milliGRAM(s) Oral every 4 hours PRN Moderate Pain (4 - 6)    Allergies    Levaquin (Hives; Flushing)      PHYSICAL EXAM:    Vital Signs Last 24 Hrs  T(C): 36.7 (18 Jan 2023 08:59), Max: 36.7 (17 Jan 2023 18:00)  T(F): 98 (18 Jan 2023 08:59), Max: 98.1 (17 Jan 2023 18:00)  HR: 78 (18 Jan 2023 08:59) (76 - 98)  BP: 122/67 (18 Jan 2023 08:59) (111/58 - 161/87)  BP(mean): 81 (17 Jan 2023 20:40) (81 - 81)  RR: 18 (18 Jan 2023 08:59) (12 - 18)  SpO2: 100% (18 Jan 2023 08:59) (97% - 100%)    Parameters below as of 18 Jan 2023 08:59  Patient On (Oxygen Delivery Method): room air    Patient sitting upright in bed  Tolerating diet  Voiding  Dressing reinforced-no active drainage  BETITO with minimal drainage-left in today  neuro without deficits    LABS                        10.5   7.07  )-----------( 194      ( 18 Jan 2023 08:39 )             33.1     01-18    137  |  106  |  19  ----------------------------<  122<H>  3.9   |  25  |  0.50    Ca    8.8      18 Jan 2023 08:39             HPI  Patient is a 57y Female who presents to  ED w/ a c/o of 2 weeks of drainage from her surgical site s/p L2-5 lumbar lami/fusion with Dr Schaefer 12/2/22. Patient states she noticed drainage and now burning pain at her surigcal site , Denies Fevers/chills/CP/SOB/palpitations/N/v/Headache/confusion/dizziness/weakness/fatigue. States ability to walk. Denies any bowel/bladder incontinence. Endorses BUE paresthesia which are always present. Denies having any other pain elsewhere. No other orthopedic concerns at this time.    1/17/23 Patient underwent I&D of superficial wound infection-contained to extrafascial plane  1/18/23 Patient comfortable. IVAB changed by ID-awaiting results    PMH/PSH:  Lumbar stenosis    Spondylolisthesis    HTN (hypertension)    Peripheral edema    Rheumatoid arthritis    Anxiety and depression    Asthma    Lower back pain    Osteoarthritis of both knees    Joint pain    Obesity    Seasonal allergies    Prolapse of female pelvic organs    Eczema    2019 novel coronavirus disease (COVID-19)    MEDICATIONS  (STANDING):  acetaminophen     Tablet .. 650 milliGRAM(s) Oral every 6 hours  amLODIPine   Tablet 10 milliGRAM(s) Oral daily  cefTRIAXone Injectable. 2000 milliGRAM(s) IV Push every 24 hours  cyclobenzaprine 10 milliGRAM(s) Oral every 8 hours  FLUoxetine 40 milliGRAM(s) Oral daily  gabapentin 600 milliGRAM(s) Oral three times a day  hydrocortisone sodium succinate Injectable 25 milliGRAM(s) IV Push every 8 hours  influenza   Vaccine 0.5 milliLiter(s) IntraMuscular once  lactated ringers. 1000 milliLiter(s) (75 mL/Hr) IV Continuous <Continuous>  lactated ringers. 1000 milliLiter(s) (100 mL/Hr) IV Continuous <Continuous>  lisinopril 10 milliGRAM(s) Oral every 12 hours  montelukast 10 milliGRAM(s) Oral at bedtime  multivitamin 1 Tablet(s) Oral daily  senna 2 Tablet(s) Oral at bedtime  vancomycin  IVPB 1000 milliGRAM(s) IV Intermittent every 12 hours    MEDICATIONS  (PRN):  acetaminophen     Tablet .. 650 milliGRAM(s) Oral every 6 hours PRN Temp greater or equal to 38.5C (101.3F), Mild Pain (1 - 3)  albuterol    90 MICROgram(s) HFA Inhaler 2 Puff(s) Inhalation every 6 hours PRN for shortness of breath and/or wheezing  clonazePAM  Tablet 0.5 milliGRAM(s) Oral two times a day PRN anxiety  diazepam    Tablet 5 milliGRAM(s) Oral every 12 hours PRN muscle spasm  diphenhydrAMINE Injectable 12.5 milliGRAM(s) IV Push every 4 hours PRN Itching  HYDROmorphone  Injectable 0.5 milliGRAM(s) IV Push every 6 hours PRN Severe Pain (7 - 10)  morphine  - Injectable 4 milliGRAM(s) IV Push every 3 hours PRN Severe Pain (7 - 10)  oxyCODONE    IR 10 milliGRAM(s) Oral every 4 hours PRN Moderate Pain (4 - 6)  oxyCODONE    IR 5 milliGRAM(s) Oral every 4 hours PRN Mild Pain (1 - 3)  oxyCODONE    IR 10 milliGRAM(s) Oral every 4 hours PRN Moderate Pain (4 - 6)    Allergies    Levaquin (Hives; Flushing)      PHYSICAL EXAM:    Vital Signs Last 24 Hrs  T(C): 36.7 (18 Jan 2023 08:59), Max: 36.7 (17 Jan 2023 18:00)  T(F): 98 (18 Jan 2023 08:59), Max: 98.1 (17 Jan 2023 18:00)  HR: 78 (18 Jan 2023 08:59) (76 - 98)  BP: 122/67 (18 Jan 2023 08:59) (111/58 - 161/87)  BP(mean): 81 (17 Jan 2023 20:40) (81 - 81)  RR: 18 (18 Jan 2023 08:59) (12 - 18)  SpO2: 100% (18 Jan 2023 08:59) (97% - 100%)    Parameters below as of 18 Jan 2023 08:59  Patient On (Oxygen Delivery Method): room air    Patient sitting upright in bed  Tolerating diet  Voiding  Dressing reinforced-no active drainage  BETITO with minimal drainage-left in today  neuro without deficits    LABS                        10.5   7.07  )-----------( 194      ( 18 Jan 2023 08:39 )             33.1     01-18    137  |  106  |  19  ----------------------------<  122<H>  3.9   |  25  |  0.50    Ca    8.8      18 Jan 2023 08:39      Wound cultures pending

## 2023-01-18 NOTE — CONSULT NOTE ADULT - ASSESSMENT
56 y/o Female with h/o lumbar spine stenosis s/p decompression/ fusion on 12/2/2022, RA, OA, asthma, depression, HTN was admitted on 1/17 for drainage from her surgical site x 2 weeks PTA. She underwent L2-5 lumbar lami/fusion with Dr Schaefer 12/2/22 and did well postoperative until 2 weeks PRA when the patient states she noticed drainage and now burning pain at her surgical site. She denies fevers/chills/CP/SOB/palpitations/N/v/Headache/confusion/dizziness/weakness/fatigue. States ability to walk. Denies any bowel/bladder incontinence. Endorses BUE paresthesia which are always present. On 1/17 she underwent irrigation and debridement, spine, lumbosacral and superficial infection with minimal purulence was reported. Surgical cultures were collected. She received cefazolin.     1. Lumbar spine surgical site infection s/p recent lumbar spine decompression/fusion  -superficial infection with purulence noted during surgical debridement, but concern about a possible deeper infectious process remains  -BC x 2 collected  -surgical cultures collected  -start vancomycin 1000 mg IV Q12h and ceftriaxone 2 gm IV qd  -reason for abx use and side effects reviewed with patient; monitor BMP and vancomycin trough levels   -f/u cultures  -will d/w spine surgery RE further abx management  -local wound care  -old chart reviewed to assess prior cultures  -monitor temps  -f/u CBC  -supportive care  2. Other issues:   -care per medicine

## 2023-01-18 NOTE — PROGRESS NOTE ADULT - ASSESSMENT
A/P: 57F w/ Surgical site infection s/p L2-5 Lami/PLIF 12/2/22-S/P I&D 1/17/23 with findings of superficial infection    Plan:  -Admit to ortho spine  -FU wound cultures  -IV ancef q8h  -Analgesia prn  -Medical management.  -Srat OOB/PT  -Monitor BETITO A/P: 57F w/ Surgical site infection s/p L2-5 Lami/PLIF 12/2/22-S/P I&D 1/17/23 with findings of superficial infection    Plan:  -Admit to ortho spine  -FU wound cultures  -IVAB as per ID  -Analgesia prn  -Medical management.  -Start OOB/PT  -Monitor BETITO

## 2023-01-18 NOTE — PHYSICAL THERAPY INITIAL EVALUATION ADULT - MODALITIES TREATMENT COMMENTS
pt left in bed supine post Eval @ pt request; bed alarm on; IV, BETITO drain in place; callbell in reach; pt instructed not to get up alone; call nursing for assist; lynne well; pain level 6/10; DAVID jack

## 2023-01-18 NOTE — CONSULT NOTE ADULT - SUBJECTIVE AND OBJECTIVE BOX
Patient is a 57y old  Female who presents with a chief complaint of Surgical site infection s/p Lumbar decompression/fusion 22     HPI:  58 y/o Female with h/o lumbar spine stenosis s/p decompression/ fusion on 2022, RA, OA, asthma, depression, HTN was admitted on  for drainage from her surgical site x 2 weeks PTA. She underwent L2-5 lumbar lami/fusion with Dr Schaefer 22 and did well postoperative until 2 weeks PRA when the patient states she noticed drainage and now burning pain at her surgical site. She denies fevers/chills/CP/SOB/palpitations/N/v/Headache/confusion/dizziness/weakness/fatigue. States ability to walk. Denies any bowel/bladder incontinence. Endorses BUE paresthesia which are always present. On  she underwent irrigation and debridement, spine, lumbosacral and superficial infection with minimal purulence was reported. Surgical cultures were collected. She received cefazolin.     PMH:  Lumbar stenosis  Spondylolisthesis  HTN (hypertension)  Peripheral edema  Rheumatoid arthritis  Anxiety and depression  Asthma  Lower back pain  Osteoarthritis of both knees  Joint pain  Obesity  Prolapse of female pelvic organs  Eczema  2019 novel coronavirus disease (COVID-19)    PMH: as above  PSH: as above  Meds: per reconciliation sheet, noted below  MEDICATIONS  (STANDING):  acetaminophen     Tablet .. 650 milliGRAM(s) Oral every 6 hours  amLODIPine   Tablet 10 milliGRAM(s) Oral daily  ceFAZolin   IVPB 2000 milliGRAM(s) IV Intermittent every 8 hours  cyclobenzaprine 10 milliGRAM(s) Oral every 8 hours  FLUoxetine 40 milliGRAM(s) Oral daily  gabapentin 600 milliGRAM(s) Oral three times a day  influenza   Vaccine 0.5 milliLiter(s) IntraMuscular once  lactated ringers. 1000 milliLiter(s) (75 mL/Hr) IV Continuous <Continuous>  lactated ringers. 1000 milliLiter(s) (100 mL/Hr) IV Continuous <Continuous>  lisinopril 10 milliGRAM(s) Oral every 12 hours  montelukast 10 milliGRAM(s) Oral at bedtime  multivitamin 1 Tablet(s) Oral daily  predniSONE   Tablet 20 milliGRAM(s) Oral daily  senna 2 Tablet(s) Oral at bedtime    MEDICATIONS  (PRN):  acetaminophen     Tablet .. 650 milliGRAM(s) Oral every 6 hours PRN Temp greater or equal to 38.5C (101.3F), Mild Pain (1 - 3)  albuterol    90 MICROgram(s) HFA Inhaler 2 Puff(s) Inhalation every 6 hours PRN for shortness of breath and/or wheezing  clonazePAM  Tablet 0.5 milliGRAM(s) Oral two times a day PRN anxiety  diazepam    Tablet 5 milliGRAM(s) Oral every 12 hours PRN muscle spasm  diphenhydrAMINE Injectable 12.5 milliGRAM(s) IV Push every 4 hours PRN Itching  HYDROmorphone  Injectable 0.5 milliGRAM(s) IV Push every 6 hours PRN Severe Pain (7 - 10)  morphine  - Injectable 4 milliGRAM(s) IV Push every 3 hours PRN Severe Pain (7 - 10)  oxyCODONE    IR 10 milliGRAM(s) Oral every 4 hours PRN Moderate Pain (4 - 6)  oxyCODONE    IR 5 milliGRAM(s) Oral every 4 hours PRN Mild Pain (1 - 3)  oxyCODONE    IR 10 milliGRAM(s) Oral every 4 hours PRN Moderate Pain (4 - 6)  oxyCODONE    IR 10 milliGRAM(s) Oral once PRN Moderate Pain (4 - 6)    Allergies    Levaquin (Hives; Flushing)    Intolerances      Social: no smoking, no alcohol, no illegal drugs; no recent travel, no exposure to TB  FAMILY HISTORY:  Family history of dementia (Mother)    Family history of bone cancer (Father)    Family history of osteoarthritis (Father)    FHx: diabetes mellitus (Father)      no history of premature cardiovascular disease in first degree relatives    ROS: the patient denies fever, no chills, no HA, no seizures, no dizziness, no sore throat, no nasal congestion, no blurry vision, no CP, no palpitations, no SOB, no cough, no abdominal pain, no diarrhea, no N/V, no dysuria, no leg pain, no claudication, no rash, no joint aches, no rectal pain or bleeding, no night sweats; has lower back pain  All other systems reviewed and are negative    Vital Signs Last 24 Hrs  T(C): 36.5 (2023 00:05), Max: 36.7 (2023 18:00)  T(F): 97.7 (2023 00:05), Max: 98.1 (2023 18:00)  HR: 98 (2023 00:05) (71 - 98)  BP: 123/66 (2023 00:05) (111/58 - 161/87)  BP(mean): 81 (2023 20:40) (81 - 81)  RR: 18 (2023 00:05) (12 - 18)  SpO2: 97% (2023 00:05) (97% - 100%)    Parameters below as of 2023 00:05  Patient On (Oxygen Delivery Method): room air    PE:    Constitutional:  No acute distress  HEENT: NC/AT, EOMI, PERRLA, conjunctivae clear; ears and nose atraumatic; pharynx benign  Neck: supple; thyroid not palpable  Back: no tenderness  Respiratory: respiratory effort normal; clear to auscultation  Cardiovascular: S1S2 regular, no murmurs  Abdomen: soft, not tender, not distended, positive BS; no liver or spleen organomegaly  Genitourinary: no suprapubic tenderness  Lymphatic: no LN palpable  Musculoskeletal: no muscle tenderness, no joint swelling or tenderness  Lumbar spine postsurgical site s/p I and D; no drainage   Extremities: no pedal edema  Neurological/ Psychiatric: AxOx3, judgement and insight normal; moving all extremities  Skin: no rashes; no palpable lesions    Labs: all available labs reviewed                        12.0   9.81  )-----------( 196      ( 2023 04:33 )             36.9         137  |  104  |  14  ----------------------------<  118<H>  4.0   |  25  |  0.49<L>    Ca    9.1      2023 04:33         Urinalysis Basic - ( 2023 04:30 )    Color: Yellow / Appearance: Clear / S.020 / pH: x  Gluc: x / Ketone: Negative  / Bili: Negative / Urobili: Negative   Blood: x / Protein: Negative / Nitrite: Negative   Leuk Esterase: Small / RBC: Negative /HPF / WBC 6-10 /HPF   Sq Epi: x / Non Sq Epi: Many / Bacteria: Few    ( @ 00:03)  [facility]      Culture - Blood (collected 2023 19:36)  Source: .Blood None  Preliminary Report (2023 03:01):    No growth to date.    Culture - Blood (collected 2023 19:36)  Source: .Blood None  Preliminary Report (2023 03:01):    No growth to date.    Culture - Urine (collected 2023 19:24)  Source: Clean Catch None  Final Report (15 Edilberto 2023 23:18):    <10,000 CFU/mL Normal Urogenital Rosangela    Radiology: all available radiological tests reviewed    Advanced directives addressed: full resuscitation
56 y/o F w/ PMH of anxiety, depression, HTN, lumbar stensois, obesity, OA, RA , p/w surgical site infection. Patient states that the bottom of surgical scar still remains open. Patient has been having drainage, and pain associated with it. Patient was evaluated by her ortho and sent in to ED. Patient evaluted by ortho at bedside and plans to do I&D tomorrow. Denies CP, SOB, cough, runny nose, sore throat     PSH: TKR, cholecystectomy, hysterectomy, uretral sling creation     Social Hx: Denies tobacco / etoh / drugs     Family Hx: Father - bone cancer, mother - dementia

## 2023-01-18 NOTE — CONSULT NOTE ADULT - REASON FOR ADMISSION
Surgical site infection s/p Lumbar decompression/fusion 12/2/22
Surgical site infection s/p Lumbar decompression/fusion 12/2/22

## 2023-01-19 LAB — VANCOMYCIN TROUGH SERPL-MCNC: 30.1 UG/ML — CRITICAL HIGH (ref 10–20)

## 2023-01-19 PROCEDURE — 99232 SBSQ HOSP IP/OBS MODERATE 35: CPT

## 2023-01-19 RX ORDER — GENTAMICIN SULFATE 3 MG/ML
1 SOLUTION/ DROPS OPHTHALMIC EVERY 4 HOURS
Refills: 0 | Status: DISCONTINUED | OUTPATIENT
Start: 2023-01-19 | End: 2023-01-19

## 2023-01-19 RX ORDER — HYDROCORTISONE 1 %
1 OINTMENT (GRAM) TOPICAL
Refills: 0 | Status: DISCONTINUED | OUTPATIENT
Start: 2023-01-19 | End: 2023-01-22

## 2023-01-19 RX ADMIN — OXYCODONE HYDROCHLORIDE 10 MILLIGRAM(S): 5 TABLET ORAL at 20:00

## 2023-01-19 RX ADMIN — AMLODIPINE BESYLATE 10 MILLIGRAM(S): 2.5 TABLET ORAL at 10:06

## 2023-01-19 RX ADMIN — Medication 650 MILLIGRAM(S): at 20:00

## 2023-01-19 RX ADMIN — CYCLOBENZAPRINE HYDROCHLORIDE 10 MILLIGRAM(S): 10 TABLET, FILM COATED ORAL at 06:11

## 2023-01-19 RX ADMIN — Medication 650 MILLIGRAM(S): at 23:28

## 2023-01-19 RX ADMIN — OXYCODONE HYDROCHLORIDE 10 MILLIGRAM(S): 5 TABLET ORAL at 15:35

## 2023-01-19 RX ADMIN — OXYCODONE HYDROCHLORIDE 10 MILLIGRAM(S): 5 TABLET ORAL at 10:11

## 2023-01-19 RX ADMIN — Medication 25 MILLIGRAM(S): at 01:11

## 2023-01-19 RX ADMIN — LISINOPRIL 10 MILLIGRAM(S): 2.5 TABLET ORAL at 21:13

## 2023-01-19 RX ADMIN — Medication 650 MILLIGRAM(S): at 19:30

## 2023-01-19 RX ADMIN — CEFTRIAXONE 2000 MILLIGRAM(S): 500 INJECTION, POWDER, FOR SOLUTION INTRAMUSCULAR; INTRAVENOUS at 06:12

## 2023-01-19 RX ADMIN — OXYCODONE HYDROCHLORIDE 10 MILLIGRAM(S): 5 TABLET ORAL at 23:59

## 2023-01-19 RX ADMIN — Medication 1 TABLET(S): at 10:06

## 2023-01-19 RX ADMIN — CYCLOBENZAPRINE HYDROCHLORIDE 10 MILLIGRAM(S): 10 TABLET, FILM COATED ORAL at 21:13

## 2023-01-19 RX ADMIN — MONTELUKAST 10 MILLIGRAM(S): 4 TABLET, CHEWABLE ORAL at 21:13

## 2023-01-19 RX ADMIN — GABAPENTIN 600 MILLIGRAM(S): 400 CAPSULE ORAL at 13:08

## 2023-01-19 RX ADMIN — Medication 1 APPLICATION(S): at 21:22

## 2023-01-19 RX ADMIN — OXYCODONE HYDROCHLORIDE 10 MILLIGRAM(S): 5 TABLET ORAL at 14:35

## 2023-01-19 RX ADMIN — Medication 40 MILLIGRAM(S): at 10:06

## 2023-01-19 RX ADMIN — Medication 650 MILLIGRAM(S): at 11:36

## 2023-01-19 RX ADMIN — OXYCODONE HYDROCHLORIDE 10 MILLIGRAM(S): 5 TABLET ORAL at 11:11

## 2023-01-19 RX ADMIN — CYCLOBENZAPRINE HYDROCHLORIDE 10 MILLIGRAM(S): 10 TABLET, FILM COATED ORAL at 13:08

## 2023-01-19 RX ADMIN — Medication 20 MILLIGRAM(S): at 10:06

## 2023-01-19 RX ADMIN — GABAPENTIN 600 MILLIGRAM(S): 400 CAPSULE ORAL at 06:11

## 2023-01-19 RX ADMIN — OXYCODONE HYDROCHLORIDE 10 MILLIGRAM(S): 5 TABLET ORAL at 23:29

## 2023-01-19 RX ADMIN — Medication 650 MILLIGRAM(S): at 06:11

## 2023-01-19 RX ADMIN — Medication 650 MILLIGRAM(S): at 06:17

## 2023-01-19 RX ADMIN — Medication 250 MILLIGRAM(S): at 21:14

## 2023-01-19 RX ADMIN — LISINOPRIL 10 MILLIGRAM(S): 2.5 TABLET ORAL at 10:07

## 2023-01-19 RX ADMIN — GABAPENTIN 600 MILLIGRAM(S): 400 CAPSULE ORAL at 21:13

## 2023-01-19 RX ADMIN — OXYCODONE HYDROCHLORIDE 10 MILLIGRAM(S): 5 TABLET ORAL at 19:30

## 2023-01-19 RX ADMIN — Medication 250 MILLIGRAM(S): at 10:06

## 2023-01-19 RX ADMIN — SENNA PLUS 2 TABLET(S): 8.6 TABLET ORAL at 21:13

## 2023-01-19 NOTE — CDI QUERY NOTE - NSCDIOTHERTXTBX_GEN_ALL_CORE_HH
Per OR report 1/17/2023  "Superficial infection, Status post laminectomy and fusion  Irrigation and debridement of lumbar Spinal wound"    The necrotic wound edges was excised in an elliptical manner.  Next the subcutaneous tissue region was examined  The  subcutaneous tissue was irrigated and suctioned dried. Carias was used to debride the wound edges bilaterally"    Brief OP note:    PRE-OP DIAGNOSIS:  Superficial skin infection 17-Jan-2023 16:31:54  Vasyl Schaefer  ·  POST-OP DIAGNOSIS:  Superficial skin infection 17-Jan-2023 16:32:22  Vasyl Schaefer  ·  PROCEDURES:  Irrigation and debridement, spine, lumbosacral     * Please clarify and include the following additional detail:  Type of debridement  a) excisional debridement  b) non-excisional debridement       and the Depth of tissue debrided   a) Skin only  b) Fascia  c) Skin , and Subcutaneous tissue  d) Muscle  e) Other, please clarify

## 2023-01-19 NOTE — PROGRESS NOTE ADULT - ASSESSMENT
58 y/o Female with h/o lumbar spine stenosis s/p decompression/ fusion on 12/2/2022, RA, OA, asthma, depression, HTN was admitted on 1/17 for drainage from her surgical site x 2 weeks PTA. She underwent L2-5 lumbar lami/fusion with Dr Schaefer 12/2/22 and did well postoperative until 2 weeks PRA when the patient states she noticed drainage and now burning pain at her surgical site. She denies fevers/chills/CP/SOB/palpitations/N/v/Headache/confusion/dizziness/weakness/fatigue. States ability to walk. Denies any bowel/bladder incontinence. Endorses BUE paresthesia which are always present. On 1/17 she underwent irrigation and debridement, spine, lumbosacral and superficial infection with minimal purulence was reported. Surgical cultures were collected. She received cefazolin.     1. Lumbar spine surgical site infection s/p recent lumbar spine decompression/fusion  -superficial infection with purulence noted during surgical debridement, but concern about a possible deeper infectious process remains  -BC x 2 noted  -surgical cultures collected  -on vancomycin 1000 mg IV Q12h and ceftriaxone 2 gm IV qd # 2  -tolerating abx well so far; no side effects noted  -obtain vancomycin trough level   -f/u cultures  -local wound care  -continue abx coverage   -monitor temps  -f/u CBC  -supportive care  2. Other issues:   -care per medicine

## 2023-01-19 NOTE — PROGRESS NOTE ADULT - ASSESSMENT
A/P: 57F w/ Surgical site infection s/p L2-5 Lami/PLIF 12/2/22-S/P I&D 1/17/23 with findings of superficial infection    Plan:  -Admit to ortho spine  -(+) wound cultures for STAU  -IVAB as per ID  -Analgesia prn  -Medical management.  -Increase OOB/PT  -Monitor BETITO

## 2023-01-19 NOTE — PROGRESS NOTE ADULT - SUBJECTIVE AND OBJECTIVE BOX
Date of service: 23 @ 08:45    Lying in bed in NAD  Has lumbar spine discomfort  No fever    ROS: no fever or chills; denies dizziness, no HA, no SOB or cough, no abdominal pain, no diarrhea or constipation; no dysuria, no legs pain, no rashes    MEDICATIONS  (STANDING):  acetaminophen     Tablet .. 650 milliGRAM(s) Oral every 6 hours  amLODIPine   Tablet 10 milliGRAM(s) Oral daily  cefTRIAXone Injectable. 2000 milliGRAM(s) IV Push every 24 hours  cyclobenzaprine 10 milliGRAM(s) Oral every 8 hours  FLUoxetine 40 milliGRAM(s) Oral daily  gabapentin 600 milliGRAM(s) Oral three times a day  gentamicin 0.3% Ophthalmic Solution 1 Drop(s) Right EYE every 4 hours  influenza   Vaccine 0.5 milliLiter(s) IntraMuscular once  lactated ringers. 1000 milliLiter(s) (75 mL/Hr) IV Continuous <Continuous>  lactated ringers. 1000 milliLiter(s) (100 mL/Hr) IV Continuous <Continuous>  lactobacillus acidophilus 1 Tablet(s) Oral daily  lisinopril 10 milliGRAM(s) Oral every 12 hours  montelukast 10 milliGRAM(s) Oral at bedtime  multivitamin 1 Tablet(s) Oral daily  predniSONE   Tablet 20 milliGRAM(s) Oral daily  senna 2 Tablet(s) Oral at bedtime  vancomycin  IVPB 1000 milliGRAM(s) IV Intermittent every 12 hours    Vital Signs Last 24 Hrs  T(C): 36.3 (2023 05:24), Max: 36.8 (2023 20:26)  T(F): 97.3 (2023 05:24), Max: 98.2 (2023 20:26)  HR: 85 (2023 05:24) (78 - 85)  BP: 134/84 (2023 05:24) (120/69 - 140/71)  BP(mean): --  RR: 18 (2023 05:24) (18 - 18)  SpO2: 100% (2023 05:24) (97% - 100%)    Parameters below as of 2023 05:24  Patient On (Oxygen Delivery Method): room air     Physical exam:    Constitutional:  No acute distress  HEENT: NC/AT, EOMI, PERRLA, conjunctivae clear; ears and nose atraumatic  Neck: supple; thyroid not palpable  Back: no tenderness  Respiratory: respiratory effort normal; clear to auscultation  Cardiovascular: S1S2 regular, no murmurs  Abdomen: soft, not tender, not distended, positive BS  Genitourinary: no suprapubic tenderness  Lymphatic: no LN palpable  Musculoskeletal: no muscle tenderness, no joint swelling or tenderness  Lumbar spine postsurgical site s/p I and D; no drainage   Extremities: no pedal edema  Neurological/ Psychiatric: AxOx3, judgement and insight normal; moving all extremities  Skin: no rashes; no palpable lesions    Labs: reviewed                        10.5   7.07  )-----------( 194      ( 2023 08:39 )             33.1         137  |  106  |  19  ----------------------------<  122<H>  3.9   |  25  |  0.50    Ca    8.8      2023 08:39    C-Reactive Protein, Serum: 59 mg/L (23 @ 00:03)  C-Reactive Protein, Serum: <3 mg/L (23 @ 19:36)                          12.0   9.81  )-----------( 196      ( 2023 04:33 )             36.9         137  |  104  |  14  ----------------------------<  118<H>  4.0   |  25  |  0.49<L>    Ca    9.1      2023 04:33    Urinalysis Basic - ( 2023 04:30 )    Color: Yellow / Appearance: Clear / S.020 / pH: x  Gluc: x / Ketone: Negative  / Bili: Negative / Urobili: Negative   Blood: x / Protein: Negative / Nitrite: Negative   Leuk Esterase: Small / RBC: Negative /HPF / WBC 6-10 /HPF   Sq Epi: x / Non Sq Epi: Many / Bacteria: Few    ( @ 00:03)  [facility]      Culture - Blood (collected 2023 19:36)  Source: .Blood None  Preliminary Report (2023 03:01):    No growth to date.    Culture - Blood (collected 2023 19:36)  Source: .Blood None  Preliminary Report (2023 03:01):    No growth to date.    Culture - Urine (collected 2023 19:24)  Source: Clean Catch None  Final Report (15 Edilberto 2023 23:18):    <10,000 CFU/mL Normal Urogenital Rosangela    Radiology: all available radiological tests reviewed    Advanced directives addressed: full resuscitation

## 2023-01-20 ENCOUNTER — TRANSCRIPTION ENCOUNTER (OUTPATIENT)
Age: 58
End: 2023-01-20

## 2023-01-20 LAB
-  AMPICILLIN/SULBACTAM: SIGNIFICANT CHANGE UP
-  CEFAZOLIN: SIGNIFICANT CHANGE UP
-  CLINDAMYCIN: SIGNIFICANT CHANGE UP
-  DAPTOMYCIN: SIGNIFICANT CHANGE UP
-  ERYTHROMYCIN: SIGNIFICANT CHANGE UP
-  GENTAMICIN: SIGNIFICANT CHANGE UP
-  LINEZOLID: SIGNIFICANT CHANGE UP
-  OXACILLIN: SIGNIFICANT CHANGE UP
-  PENICILLIN: SIGNIFICANT CHANGE UP
-  RIFAMPIN: SIGNIFICANT CHANGE UP
-  TETRACYCLINE: SIGNIFICANT CHANGE UP
-  TRIMETHOPRIM/SULFAMETHOXAZOLE: SIGNIFICANT CHANGE UP
-  VANCOMYCIN: SIGNIFICANT CHANGE UP
ANION GAP SERPL CALC-SCNC: 4 MMOL/L — LOW (ref 5–17)
BUN SERPL-MCNC: 10 MG/DL — SIGNIFICANT CHANGE UP (ref 7–23)
CALCIUM SERPL-MCNC: 9.2 MG/DL — SIGNIFICANT CHANGE UP (ref 8.5–10.1)
CHLORIDE SERPL-SCNC: 106 MMOL/L — SIGNIFICANT CHANGE UP (ref 96–108)
CO2 SERPL-SCNC: 30 MMOL/L — SIGNIFICANT CHANGE UP (ref 22–31)
CREAT SERPL-MCNC: 0.44 MG/DL — LOW (ref 0.5–1.3)
CULTURE RESULTS: SIGNIFICANT CHANGE UP
CULTURE RESULTS: SIGNIFICANT CHANGE UP
EGFR: 111 ML/MIN/1.73M2 — SIGNIFICANT CHANGE UP
GLUCOSE SERPL-MCNC: 102 MG/DL — HIGH (ref 70–99)
METHOD TYPE: SIGNIFICANT CHANGE UP
POTASSIUM SERPL-MCNC: 3.5 MMOL/L — SIGNIFICANT CHANGE UP (ref 3.5–5.3)
POTASSIUM SERPL-SCNC: 3.5 MMOL/L — SIGNIFICANT CHANGE UP (ref 3.5–5.3)
SODIUM SERPL-SCNC: 140 MMOL/L — SIGNIFICANT CHANGE UP (ref 135–145)
SPECIMEN SOURCE: SIGNIFICANT CHANGE UP
SPECIMEN SOURCE: SIGNIFICANT CHANGE UP
VANCOMYCIN TROUGH SERPL-MCNC: 8.9 UG/ML — LOW (ref 10–20)

## 2023-01-20 PROCEDURE — 71045 X-RAY EXAM CHEST 1 VIEW: CPT | Mod: 26

## 2023-01-20 PROCEDURE — 99232 SBSQ HOSP IP/OBS MODERATE 35: CPT

## 2023-01-20 RX ORDER — VANCOMYCIN HCL 1 G
1250 VIAL (EA) INTRAVENOUS EVERY 12 HOURS
Refills: 0 | Status: DISCONTINUED | OUTPATIENT
Start: 2023-01-20 | End: 2023-01-22

## 2023-01-20 RX ORDER — VANCOMYCIN HCL 1 G
1.25 VIAL (EA) INTRAVENOUS
Qty: 0 | Refills: 0 | DISCHARGE
Start: 2023-01-20

## 2023-01-20 RX ADMIN — LISINOPRIL 10 MILLIGRAM(S): 2.5 TABLET ORAL at 09:18

## 2023-01-20 RX ADMIN — GABAPENTIN 600 MILLIGRAM(S): 400 CAPSULE ORAL at 22:31

## 2023-01-20 RX ADMIN — OXYCODONE HYDROCHLORIDE 10 MILLIGRAM(S): 5 TABLET ORAL at 09:18

## 2023-01-20 RX ADMIN — Medication 20 MILLIGRAM(S): at 09:18

## 2023-01-20 RX ADMIN — OXYCODONE HYDROCHLORIDE 10 MILLIGRAM(S): 5 TABLET ORAL at 13:56

## 2023-01-20 RX ADMIN — Medication 1 TABLET(S): at 09:18

## 2023-01-20 RX ADMIN — Medication 650 MILLIGRAM(S): at 12:01

## 2023-01-20 RX ADMIN — OXYCODONE HYDROCHLORIDE 10 MILLIGRAM(S): 5 TABLET ORAL at 14:56

## 2023-01-20 RX ADMIN — LISINOPRIL 10 MILLIGRAM(S): 2.5 TABLET ORAL at 22:31

## 2023-01-20 RX ADMIN — Medication 250 MILLIGRAM(S): at 09:19

## 2023-01-20 RX ADMIN — HYDROMORPHONE HYDROCHLORIDE 0.5 MILLIGRAM(S): 2 INJECTION INTRAMUSCULAR; INTRAVENOUS; SUBCUTANEOUS at 03:55

## 2023-01-20 RX ADMIN — Medication 650 MILLIGRAM(S): at 18:47

## 2023-01-20 RX ADMIN — Medication 1 APPLICATION(S): at 09:22

## 2023-01-20 RX ADMIN — Medication 40 MILLIGRAM(S): at 09:18

## 2023-01-20 RX ADMIN — CYCLOBENZAPRINE HYDROCHLORIDE 10 MILLIGRAM(S): 10 TABLET, FILM COATED ORAL at 13:56

## 2023-01-20 RX ADMIN — OXYCODONE HYDROCHLORIDE 10 MILLIGRAM(S): 5 TABLET ORAL at 10:15

## 2023-01-20 RX ADMIN — Medication 650 MILLIGRAM(S): at 06:16

## 2023-01-20 RX ADMIN — CEFTRIAXONE 2000 MILLIGRAM(S): 500 INJECTION, POWDER, FOR SOLUTION INTRAMUSCULAR; INTRAVENOUS at 07:01

## 2023-01-20 RX ADMIN — HYDROMORPHONE HYDROCHLORIDE 0.5 MILLIGRAM(S): 2 INJECTION INTRAMUSCULAR; INTRAVENOUS; SUBCUTANEOUS at 03:25

## 2023-01-20 RX ADMIN — OXYCODONE HYDROCHLORIDE 10 MILLIGRAM(S): 5 TABLET ORAL at 22:30

## 2023-01-20 RX ADMIN — SENNA PLUS 2 TABLET(S): 8.6 TABLET ORAL at 22:31

## 2023-01-20 RX ADMIN — GABAPENTIN 600 MILLIGRAM(S): 400 CAPSULE ORAL at 06:16

## 2023-01-20 RX ADMIN — OXYCODONE HYDROCHLORIDE 10 MILLIGRAM(S): 5 TABLET ORAL at 23:30

## 2023-01-20 RX ADMIN — AMLODIPINE BESYLATE 10 MILLIGRAM(S): 2.5 TABLET ORAL at 09:17

## 2023-01-20 RX ADMIN — GABAPENTIN 600 MILLIGRAM(S): 400 CAPSULE ORAL at 13:56

## 2023-01-20 RX ADMIN — MONTELUKAST 10 MILLIGRAM(S): 4 TABLET, CHEWABLE ORAL at 22:30

## 2023-01-20 NOTE — DISCHARGE NOTE NURSING/CASE MANAGEMENT/SOCIAL WORK - PATIENT PORTAL LINK FT
You can access the FollowMyHealth Patient Portal offered by [facility] by registering at the following website: [email or web address]. By joining [employer]’s FollowMyHealth portal, you will also be able to view your health information using other applications (apps) compatible with our system.

## 2023-01-20 NOTE — PROGRESS NOTE ADULT - SUBJECTIVE AND OBJECTIVE BOX
HPI  Patient is a 57y Female who presents to  ED w/ a c/o of 2 weeks of drainage from her surgical site s/p L2-5 lumbar lami/fusion with Dr Schaefer 12/2/22. Patient states she noticed drainage and now burning pain at her surigcal site , Denies Fevers/chills/CP/SOB/palpitations/N/v/Headache/confusion/dizziness/weakness/fatigue. States ability to walk. Denies any bowel/bladder incontinence. Endorses BUE paresthesia which are always present. Denies having any other pain elsewhere. No other orthopedic concerns at this time.    1/17/23 Patient underwent I&D of superficial wound infection-contained to extrafascial plane  1/18/23 Patient comfortable. IVAB continue-awaiting culture results  1/19/23 Patient comfortable-now on IV Vanco and Ceftriaxone  1/20/23 Patient comfortable    PMH/PSH:  Lumbar stenosis    Spondylolisthesis    HTN (hypertension)    Peripheral edema    Rheumatoid arthritis    Anxiety and depression    Asthma    Lower back pain    Osteoarthritis of both knees    Joint pain    Obesity    Seasonal allergies    Prolapse of female pelvic organs    Eczema    2019 novel coronavirus disease (COVID-19)    MEDICATIONS  (STANDING):  acetaminophen     Tablet .. 650 milliGRAM(s) Oral every 6 hours  amLODIPine   Tablet 10 milliGRAM(s) Oral daily  cefTRIAXone Injectable. 2000 milliGRAM(s) IV Push every 24 hours  cyclobenzaprine 10 milliGRAM(s) Oral every 8 hours  FLUoxetine 40 milliGRAM(s) Oral daily  gabapentin 600 milliGRAM(s) Oral three times a day  influenza   Vaccine 0.5 milliLiter(s) IntraMuscular once  lactated ringers. 1000 milliLiter(s) (75 mL/Hr) IV Continuous <Continuous>  lactated ringers. 1000 milliLiter(s) (100 mL/Hr) IV Continuous <Continuous>  lactobacillus acidophilus 1 Tablet(s) Oral daily  lisinopril 10 milliGRAM(s) Oral every 12 hours  montelukast 10 milliGRAM(s) Oral at bedtime  multivitamin 1 Tablet(s) Oral daily  predniSONE   Tablet 20 milliGRAM(s) Oral daily  senna 2 Tablet(s) Oral at bedtime  vancomycin  IVPB 1000 milliGRAM(s) IV Intermittent every 12 hours    MEDICATIONS  (PRN):  acetaminophen     Tablet .. 650 milliGRAM(s) Oral every 6 hours PRN Temp greater or equal to 38.5C (101.3F), Mild Pain (1 - 3)  albuterol    90 MICROgram(s) HFA Inhaler 2 Puff(s) Inhalation every 6 hours PRN for shortness of breath and/or wheezing  clonazePAM  Tablet 0.5 milliGRAM(s) Oral two times a day PRN anxiety  diazepam    Tablet 5 milliGRAM(s) Oral every 12 hours PRN muscle spasm  diphenhydrAMINE Injectable 12.5 milliGRAM(s) IV Push every 4 hours PRN Itching  HYDROmorphone  Injectable 0.5 milliGRAM(s) IV Push every 6 hours PRN Severe Pain (7 - 10)  morphine  - Injectable 4 milliGRAM(s) IV Push every 3 hours PRN Severe Pain (7 - 10)  oxyCODONE    IR 10 milliGRAM(s) Oral every 4 hours PRN Moderate Pain (4 - 6)  oxyCODONE    IR 5 milliGRAM(s) Oral every 4 hours PRN Mild Pain (1 - 3)  oxyCODONE    IR 10 milliGRAM(s) Oral every 4 hours PRN Moderate Pain (4 - 6)    Allergies    Levaquin (Hives; Flushing)      PHYSICAL EXAM:    Vital Signs Last 24 Hrs  T(C): 36.4 (20 Jan 2023 07:53), Max: 36.4 (20 Jan 2023 07:53)  T(F): 97.5 (20 Jan 2023 07:53), Max: 97.5 (20 Jan 2023 07:53)  HR: 76 (20 Jan 2023 07:53) (75 - 98)  BP: 147/96 (20 Jan 2023 07:53) (140/86 - 149/83)  BP(mean): 95 (19 Jan 2023 20:02) (95 - 95)  RR: 18 (20 Jan 2023 07:53) (18 - 18)  SpO2: 98% (20 Jan 2023 07:53) (96% - 98%)    Parameters below as of 20 Jan 2023 07:53  Patient On (Oxygen Delivery Method): room air    Patient sitting upright on side of bed-comfortable  Tolerating diet  Voiding  Dressing changed-no active bleeding or drainage  BETITO with minimal drainage-removed-small amount of clear drainage accompanied removal.  Neuro without deficits    LABS                                   10.5   7.07  )-----------( 194      ( 18 Jan 2023 08:39 )             33.1     01-18    137  |  106  |  19  ----------------------------<  122<H>  3.9   |  25  |  0.50    Ca    8.8      18 Jan 2023 08:39    Culture - Surgical Swab (01.17.23 @ 15:15)    -  Ampicillin/Sulbactam: R <=8/4    -  Cefazolin: R <=4    -  Clindamycin: S <=0.25    -  Daptomycin: S 1    -  Erythromycin: S <=0.25    -  Gentamicin: R >8 Should not be used as monotherapy    -  Linezolid: S 4    -  Oxacillin: R >2    -  Penicillin: R >8    -  Rifampin: S <=1 Should not be used as monotherapy    -  Tetracycline: S <=1    -  Trimethoprim/Sulfamethoxazole: S <=0.5/9.5    -  Vancomycin: S 2    Specimen Source: .Surgical Swab superficial spine wound #1    Culture Results:   Moderate Methicillin Resistant Staphylococcus aureus    Organism Identification: Methicillin resistant Staphylococcus aureus    Organism: Methicillin resistant Staphylococcus aureus    Method Type: ADI

## 2023-01-20 NOTE — DISCHARGE NOTE NURSING/CASE MANAGEMENT/SOCIAL WORK - NSDCPEFALRISK_GEN_ALL_CORE
For information on Fall & Injury Prevention, visit: [email or web address] OR  [email or web address] OR  https://www.cdc.gov/steadi/patient.html

## 2023-01-20 NOTE — DISCHARGE NOTE PROVIDER - NSDCMRMEDTOKEN_GEN_ALL_CORE_FT
Albuterol (Eqv-ProAir HFA) 90 mcg/inh inhalation aerosol: 2 puff(s) inhaled every 6 hours, As Needed - for shortness of breath and/or wheezing  cyclobenzaprine 10 mg oral tablet: 1 tab(s) orally 3 times a day, As Needed for muscle spasm  gabapentin 300 mg oral capsule: 3 cap(s) orally 3 times a day  hydrocodone-acetaminophen 10 mg-325 mg oral tablet: 1 tab(s) orally every 4 to 6 hours, As Needed MDD:4  hydroxychloroquine 200 mg oral tablet: 1 tab(s) orally 2 times a day  ***PATIENT STOPPED X 2 DAYS***  leflunomide 20 mg oral tablet: 1 tab(s) orally once a day (in the morning)  predniSONE 20 mg oral tablet: 1 tab(s) orally once a day  vancomycin 1.25 g intravenous injection: 1.25 gram(s) intravenous every 12 hours

## 2023-01-20 NOTE — DISCHARGE NOTE PROVIDER - NSDCFUADDINST_GEN_ALL_CORE_FT
Patient may shower daily with dressing. See Dr. Schaefer on Monday 1/23/23. Call if develops fever> 100.5 or marked wound drainage

## 2023-01-20 NOTE — PROGRESS NOTE ADULT - SUBJECTIVE AND OBJECTIVE BOX
Date of service: 23 @ 08:21    Lying in bed in NAD  Feels better  Has mild lumbar spine tenderness  No discharge  Reported with vancomycin level high    ROS: no fever or chills; denies dizziness, no HA, no SOB or cough, no abdominal pain, no diarrhea or constipation; no dysuria, no legs pain, no rashes    MEDICATIONS  (STANDING):  acetaminophen     Tablet .. 650 milliGRAM(s) Oral every 6 hours  amLODIPine   Tablet 10 milliGRAM(s) Oral daily  cefTRIAXone Injectable. 2000 milliGRAM(s) IV Push every 24 hours  cyclobenzaprine 10 milliGRAM(s) Oral every 8 hours  FLUoxetine 40 milliGRAM(s) Oral daily  gabapentin 600 milliGRAM(s) Oral three times a day  hydrocortisone 1% Cream 1 Application(s) Topical two times a day  influenza   Vaccine 0.5 milliLiter(s) IntraMuscular once  lactated ringers. 1000 milliLiter(s) (75 mL/Hr) IV Continuous <Continuous>  lactated ringers. 1000 milliLiter(s) (100 mL/Hr) IV Continuous <Continuous>  lactobacillus acidophilus 1 Tablet(s) Oral daily  lisinopril 10 milliGRAM(s) Oral every 12 hours  montelukast 10 milliGRAM(s) Oral at bedtime  multivitamin 1 Tablet(s) Oral daily  predniSONE   Tablet 20 milliGRAM(s) Oral daily  senna 2 Tablet(s) Oral at bedtime  vancomycin  IVPB 1000 milliGRAM(s) IV Intermittent every 12 hours    Vital Signs Last 24 Hrs  T(C): 36.4 (2023 07:53), Max: 36.4 (2023 08:53)  T(F): 97.5 (2023 07:53), Max: 97.5 (2023 08:53)  HR: 76 (2023 07:53) (75 - 98)  BP: 147/96 (2023 07:53) (118/80 - 149/83)  BP(mean): 95 (2023 20:02) (95 - 95)  RR: 18 (2023 07:53) (18 - 18)  SpO2: 98% (2023 07:53) (96% - 100%)    Parameters below as of 2023 07:53  Patient On (Oxygen Delivery Method): room air     Physical exam:    Constitutional:  No acute distress  HEENT: NC/AT, EOMI, PERRLA, conjunctivae clear; ears and nose atraumatic  Neck: supple; thyroid not palpable  Back: no tenderness  Respiratory: respiratory effort normal; clear to auscultation  Cardiovascular: S1S2 regular, no murmurs  Abdomen: soft, not tender, not distended, positive BS  Genitourinary: no suprapubic tenderness  Lymphatic: no LN palpable  Musculoskeletal: no muscle tenderness, no joint swelling or tenderness  Lumbar spine postsurgical site s/p I and D; no drainage   Extremities: no pedal edema  Neurological/ Psychiatric: AxOx3, judgement and insight normal; moving all extremities  Skin: no rashes; no palpable lesions    Labs: reviewed                        10.5   7.07  )-----------( 194      ( 2023 08:39 )             33.1         137  |  106  |  19  ----------------------------<  122<H>  3.9   |  25  |  0.50    Ca    8.8      2023 08:39        Vancomycin Level, Trough: 30.1 ug/mL ( @ 21:54)    C-Reactive Protein, Serum: 59 mg/L (23 @ 00:03)  C-Reactive Protein, Serum: <3 mg/L (23 @ 19:36)                        10.5   7.07  )-----------( 194      ( 2023 08:39 )             33.1         137  |  106  |  19  ----------------------------<  122<H>  3.9   |  25  |  0.50    Ca    8.8      2023 08:39    C-Reactive Protein, Serum: 59 mg/L (23 @ 00:03)  C-Reactive Protein, Serum: <3 mg/L (23 @ 19:36)                          12.0   9.81  )-----------( 196      ( 2023 04:33 )             36.9         137  |  104  |  14  ----------------------------<  118<H>  4.0   |  25  |  0.49<L>    Ca    9.1      2023 04:33    Urinalysis Basic - ( 2023 04:30 )    Color: Yellow / Appearance: Clear / S.020 / pH: x  Gluc: x / Ketone: Negative  / Bili: Negative / Urobili: Negative   Blood: x / Protein: Negative / Nitrite: Negative   Leuk Esterase: Small / RBC: Negative /HPF / WBC 6-10 /HPF   Sq Epi: x / Non Sq Epi: Many / Bacteria: Few    ( @ 00:03)  [facility]      Culture - Fungal, Other (collected 2023 15:15)  Source: .Other superficial spine wound #2  Preliminary Report (2023 07:42):    Testing in progress    Culture - Surgical Swab (collected 2023 15:15)  Source: .Surgical Swab superficial spine wound #2  Preliminary Report (2023 08:52):    Moderate Staphylococcus aureus See previous culture [id number]    Culture - Fungal, Other (collected 2023 15:15)  Source: .Other superficial spine wound # 1  Preliminary Report (2023 07:27):    Testing in progress    Culture - Surgical Swab (collected 2023 15:15)  Source: .Surgical Swab superficial spine wound #1  Preliminary Report (2023 08:39):    Moderate Staphylococcus aureus    Susceptibility to follow.    Culture - Urine (collected 2023 04:30)  Source: Clean Catch Clean Catch (Midstream)  Final Report (2023 06:51):    <10,000 CFU/mL Normal Urogenital Rosangela    Culture - Blood (collected 2023 00:03)  Source: .Blood None  Preliminary Report (2023 10:01):    No growth to date.    Culture - Blood (collected 2023 00:03)  Source: .Blood None  Preliminary Report (2023 10:01):    No growth to date.    Culture - Blood (collected 2023 19:36)  Source: .Blood None  Final Report (2023 03:00):    No Growth Final    Culture - Blood (collected 2023 19:36)  Source: .Blood None  Final Report (2023 03:00):    No Growth Final    Culture - Urine (collected 2023 19:24)  Source: Clean Catch None  Final Report (15 Edilberto 2023 23:18):    <10,000 CFU/mL Normal Urogenital Rosangela    Radiology: all available radiological tests reviewed    Advanced directives addressed: full resuscitation

## 2023-01-20 NOTE — PROGRESS NOTE ADULT - ASSESSMENT
A/P: 57F w/ Surgical site infection s/p L2-5 Lami/PLIF 12/2/22-S/P I&D 1/17/23 with findings of superficial infection    Plan: POD #3  -Admit to ortho spine  -(+) wound cultures for MSSA  -IVAB as per ID  -Analgesia prn  -Medical management.  -Increase OOB/PT   A/P: 57F w/ Surgical site infection s/p L2-5 Lami/PLIF 12/2/22-S/P I&D 1/17/23 with findings of superficial infection    Plan: POD #3  -Admit to ortho spine  -(+) wound cultures for MRSA  -IVAB as per ID  -Analgesia prn  -Medical management.  -Increase OOB/PT

## 2023-01-20 NOTE — CHART NOTE - NSCHARTNOTEFT_GEN_A_CORE
Called about a critical value of vanc trough at 30. Will inform the day team.
HOSPITALIST ADDENDUM:    pt seen and examined this am. chart/labs reviewed.   57F, pmh of HTN, RA, Anxiety/depression, L2-5 Lumbar lami/fusion 12/2 who is admitted with poor wound healing/possible post op infn.   -has been on prednisone 20mg daily X at least a month.   -can not r/o suppression of adrenal axis.   -will need to resume prednisone.   -will need stress dose steroids periop-->hydrocortisone 50mg iv on call to OR, then 25mg iv hydrocortisone Q8H post op.   -rest of mx as outlined in initial hospitalist consult
Reported with MRSA in wound c/s  Case reviewed with Dr. Schaefer again  Plan to treated with IV vancomycin 1250 mg IV q12h for 6 weeks via PICC line  Weekly labs and close f/u

## 2023-01-20 NOTE — PROVIDER CONTACT NOTE (CRITICAL VALUE NOTIFICATION) - BACKGROUND
Results obtained through [facility] a 0900 1/20, provider informed by DAVID Bang. Patient isolated for infection and arrangements made to transfer patient to alternative unit. Phone call from lab received 1930.
Pt post lumbar lami l2-l5 with infection to BETITO drain sites

## 2023-01-20 NOTE — DISCHARGE NOTE PROVIDER - NSDCCPCAREPLAN_GEN_ALL_CORE_FT
PRINCIPAL DISCHARGE DIAGNOSIS  Diagnosis: Postoperative wound infection  Assessment and Plan of Treatment:

## 2023-01-20 NOTE — PROGRESS NOTE ADULT - SUBJECTIVE AND OBJECTIVE BOX
C/c: poor wound healing.     HPI: 57F, PMH of HTN, anxiety, depression, asthma, lumbar stenosis, obesity, OA, RA, Prosthetic joint infn s/p eileen/spacer and revision,  presented to ED with poorly healing wound from recent lumbar lami/fusion performed on dec 2nd 2022. She was prescribed outpt abx without improvement.   Hospitalist service consulted for medical comanagement/clearance. She has been on prednisone 20mg daily for about a month prior to admission. Given periop steroids.  she underwent I+D 1/17.     1/20/23- up and ambulating. +wound c/s MRSA    ROS: all 10 systems reviewed and is as above otherwise negative.     Vital Signs Last 24 Hrs  T(C): 36.4 (20 Jan 2023 07:53), Max: 36.4 (20 Jan 2023 07:53)  T(F): 97.5 (20 Jan 2023 07:53), Max: 97.5 (20 Jan 2023 07:53)  HR: 76 (20 Jan 2023 07:53) (75 - 98)  BP: 147/96 (20 Jan 2023 07:53) (140/86 - 149/83)  BP(mean): 95 (19 Jan 2023 20:02) (95 - 95)  RR: 18 (20 Jan 2023 07:53) (18 - 18)  SpO2: 98% (20 Jan 2023 07:53) (96% - 98%)    Parameters below as of 20 Jan 2023 07:53  Patient On (Oxygen Delivery Method): room air    PHYSICAL EXAM:  GENERAL: Comfortable, no acute distress   HEAD:  Normocephalic, atraumatic  EYES: EOMI, PERRLA  HEENT: Moist mucous membranes  NECK: Supple, No JVD  NERVOUS SYSTEM:  Alert & Oriented X3, Motor Strength 5/5 B/L upper and lower extremities  CHEST/LUNG: Clear to auscultation bilaterally  HEART: Regular rate and rhythm  ABDOMEN: Soft, Nontender, Nondistended, Bowel sounds present  GENITOURINARY: Voiding, no palpable bladder  EXTREMITIES:   No clubbing, cyanosis, or edema  MUSCULOSKELTAL- back dressing +  SKIN-no rash    LABS:             20 Jan 2023 08:10    140    |  106    |  10     ----------------------------<  102    3.5     |  30     |  0.44     Ca    9.2        20 Jan 2023 08:10    MEDICATIONS  (STANDING):  acetaminophen     Tablet .. 650 milliGRAM(s) Oral every 6 hours  amLODIPine   Tablet 10 milliGRAM(s) Oral daily  cefTRIAXone Injectable. 2000 milliGRAM(s) IV Push every 24 hours  cyclobenzaprine 10 milliGRAM(s) Oral every 8 hours  FLUoxetine 40 milliGRAM(s) Oral daily  gabapentin 600 milliGRAM(s) Oral three times a day  hydrocortisone sodium succinate Injectable 25 milliGRAM(s) IV Push every 8 hours  influenza   Vaccine 0.5 milliLiter(s) IntraMuscular once  lactated ringers. 1000 milliLiter(s) (75 mL/Hr) IV Continuous <Continuous>  lactated ringers. 1000 milliLiter(s) (100 mL/Hr) IV Continuous <Continuous>  lactobacillus acidophilus 1 Tablet(s) Oral daily  lisinopril 10 milliGRAM(s) Oral every 12 hours  montelukast 10 milliGRAM(s) Oral at bedtime  multivitamin 1 Tablet(s) Oral daily  senna 2 Tablet(s) Oral at bedtime  vancomycin  IVPB 1000 milliGRAM(s) IV Intermittent every 12 hours    MEDICATIONS  (PRN):  acetaminophen     Tablet .. 650 milliGRAM(s) Oral every 6 hours PRN Temp greater or equal to 38.5C (101.3F), Mild Pain (1 - 3)  albuterol    90 MICROgram(s) HFA Inhaler 2 Puff(s) Inhalation every 6 hours PRN for shortness of breath and/or wheezing  clonazePAM  Tablet 0.5 milliGRAM(s) Oral two times a day PRN anxiety  diazepam    Tablet 5 milliGRAM(s) Oral every 12 hours PRN muscle spasm  diphenhydrAMINE Injectable 12.5 milliGRAM(s) IV Push every 4 hours PRN Itching  HYDROmorphone  Injectable 0.5 milliGRAM(s) IV Push every 6 hours PRN Severe Pain (7 - 10)  morphine  - Injectable 4 milliGRAM(s) IV Push every 3 hours PRN Severe Pain (7 - 10)  oxyCODONE    IR 10 milliGRAM(s) Oral every 4 hours PRN Moderate Pain (4 - 6)  oxyCODONE    IR 5 milliGRAM(s) Oral every 4 hours PRN Mild Pain (1 - 3)  oxyCODONE    IR 10 milliGRAM(s) Oral every 4 hours PRN Moderate Pain (4 - 6)    ASSESSMENT AND PLAN:  57F, pmh as above a/w    1. Recent Lumbar lami/fusion complicated by post op infn:  -pt with h/o post op infns, likely related to underlying immunosuppression with RA/RA treatment.   -s/p I+D 1/7.   -wound c/s +MRSA  -drain removed  -ID following.   -will need PICC line for 6 weeks of IV abx on discharge  -physical therapy  -incentive spirometry.     2. Acute blood loss anemia:  -d/t surgery  -no need for transfusion at this time.     3. H/o RA:  -has been on prednisone for at least a month @ 20mg daily.   -s/p iv hydrocortisone 50mg on call to OR/then 25mg Q8H X 3.  -back on prednisone at home dose.    4. HTN:  -continue lisinopril.     5. Asthma:  -prn albuterol.   -singulair.     6. Anxiety/depression:  -continue fluoxetine, prn klonipin.     7. DVT px:  -per primary team.     #Dispo- likely home when PICC line placed and IV abx arranged for home

## 2023-01-20 NOTE — DISCHARGE NOTE PROVIDER - CARE PROVIDER_API CALL
Vasyl Schaefer)  Orthopaedic Surgery  [address] [place] [address]  Phone: [phone]  Fax: [phone]  Follow Up Time:

## 2023-01-20 NOTE — PROGRESS NOTE ADULT - ASSESSMENT
56 y/o Female with h/o lumbar spine stenosis s/p decompression/ fusion on 12/2/2022, RA, OA, asthma, depression, HTN was admitted on 1/17 for drainage from her surgical site x 2 weeks PTA. She underwent L2-5 lumbar lami/fusion with Dr Schaefer 12/2/22 and did well postoperative until 2 weeks PRA when the patient states she noticed drainage and now burning pain at her surgical site. She denies fevers/chills/CP/SOB/palpitations/N/v/Headache/confusion/dizziness/weakness/fatigue. States ability to walk. Denies any bowel/bladder incontinence. Endorses BUE paresthesia which are always present. On 1/17 she underwent irrigation and debridement, spine, lumbosacral and superficial infection with minimal purulence was reported. Surgical cultures were collected. She received cefazolin.     1. Lumbar spine surgical site infection with STAU s/p recent lumbar spine decompression/fusion  -superficial infection with purulence noted during surgical debridement, but concern about a possible deeper infectious process remains  -BC x 2 noted  -surgical cultures collected  -on vancomycin 1000 mg IV Q12h and ceftriaxone 2 gm IV qd # 3  -tolerating abx well so far; no side effects noted  -vancomycin trough level is high, but it was collected during vancomycin infusion  -repeat vancomycin level before next dose  -abx management d/w patient oral abx vs IV abx therapy; the patient is anxious about possible deeper staph infection, but the surgeons felt that she has only a superficial skin infection  -case d/w Dr. Schaefer - plan for discharge on oral abx with close f/u with surgery; if wound not healing well, will reconsider for IV abx therapy  -f/u cultures  -local wound care  -continue abx coverage   -monitor temps  -f/u CBC  -supportive care  2. Other issues:   -care per medicine

## 2023-01-20 NOTE — ADVANCED PRACTICE NURSE CONSULT - ASSESSMENT
Patient received on 2N 224-1 awake, alert, oriented x 4, anxious but cooperative. Benefits, risks and possible complications of procedure explained to patient verbalizes understanding. Gave verbal and written consent after all questions answered in detail. Hand hygiene and time out performed by both team members. Patient verified using first and last name, , MRN and account number.  Patient placed in semi-fowlers position. Site prepped with CHG. Draped in sterile fashion. Correct site confirmed, and lidocaine 1% 3cc subdermal injected to site prior to start of procedure. Using MST technique, Navilyst 4F with PASV technology (Lot #[id number]) inserted via ultrasound guidance to right basilic vein, and cut to 46cm. Flushes well with 40cc of NS with brisk blood return present. Line secured to skin using statlock, site covered with gauze and DSD. End cap placed. Sterile field maintained throughout procedure. Minimal blood loss. No complications. Patient tolerated procedure well. Chest xray to confirm placement. All sharps accounted for.

## 2023-01-20 NOTE — DISCHARGE NOTE PROVIDER - HOSPITAL COURSE
Patient is a 57y Female who presents to  ED w/ a c/o of 2 weeks of drainage from her surgical site s/p L2-5 lumbar lami/fusion with Dr Schaefer 12/2/22. Patient states she noticed drainage and now burning pain at her surgical site , Denies Fevers/chills/CP/SOB/palpitations/N/v/Headache/confusion/dizziness/weakness/fatigue. States ability to walk. Denies any bowel/bladder incontinence. Endorses BUE paresthesia which are always present. Denies having any other pain elsewhere. No other orthopedic concerns at this time.    1/17/23 Patient underwent I&D of superficial wound infection-contained to extrafascial plane  1/18/23 Patient comfortable. IVAB continue-awaiting culture results  1/19/23 Patient comfortable-now on IV Vanco and Ceftriaxone initially due to MRSA-sensitive to Vanco. Recommended 6 week course as per ID. BETITO with minimal drainage-left in.   1/20/23 Patient comfortable. BETITO with minimal drainage-removed. Incision clean and dry. Sutures intact. Patient had PICC line placed. Stable for D/C if Home IVAB therapy can be arranged

## 2023-01-21 PROCEDURE — 99232 SBSQ HOSP IP/OBS MODERATE 35: CPT

## 2023-01-21 RX ADMIN — OXYCODONE HYDROCHLORIDE 10 MILLIGRAM(S): 5 TABLET ORAL at 10:30

## 2023-01-21 RX ADMIN — GABAPENTIN 600 MILLIGRAM(S): 400 CAPSULE ORAL at 13:54

## 2023-01-21 RX ADMIN — Medication 20 MILLIGRAM(S): at 10:02

## 2023-01-21 RX ADMIN — Medication 1 TABLET(S): at 10:02

## 2023-01-21 RX ADMIN — Medication 40 MILLIGRAM(S): at 10:02

## 2023-01-21 RX ADMIN — Medication 1 APPLICATION(S): at 21:15

## 2023-01-21 RX ADMIN — Medication 1 APPLICATION(S): at 00:01

## 2023-01-21 RX ADMIN — LISINOPRIL 10 MILLIGRAM(S): 2.5 TABLET ORAL at 21:16

## 2023-01-21 RX ADMIN — Medication 650 MILLIGRAM(S): at 05:13

## 2023-01-21 RX ADMIN — CYCLOBENZAPRINE HYDROCHLORIDE 10 MILLIGRAM(S): 10 TABLET, FILM COATED ORAL at 13:54

## 2023-01-21 RX ADMIN — OXYCODONE HYDROCHLORIDE 10 MILLIGRAM(S): 5 TABLET ORAL at 16:45

## 2023-01-21 RX ADMIN — MONTELUKAST 10 MILLIGRAM(S): 4 TABLET, CHEWABLE ORAL at 21:15

## 2023-01-21 RX ADMIN — GABAPENTIN 600 MILLIGRAM(S): 400 CAPSULE ORAL at 21:15

## 2023-01-21 RX ADMIN — CYCLOBENZAPRINE HYDROCHLORIDE 10 MILLIGRAM(S): 10 TABLET, FILM COATED ORAL at 05:13

## 2023-01-21 RX ADMIN — AMLODIPINE BESYLATE 10 MILLIGRAM(S): 2.5 TABLET ORAL at 10:02

## 2023-01-21 RX ADMIN — Medication 650 MILLIGRAM(S): at 17:25

## 2023-01-21 RX ADMIN — Medication 1 APPLICATION(S): at 10:05

## 2023-01-21 RX ADMIN — CYCLOBENZAPRINE HYDROCHLORIDE 10 MILLIGRAM(S): 10 TABLET, FILM COATED ORAL at 00:01

## 2023-01-21 RX ADMIN — SENNA PLUS 2 TABLET(S): 8.6 TABLET ORAL at 21:15

## 2023-01-21 RX ADMIN — OXYCODONE HYDROCHLORIDE 10 MILLIGRAM(S): 5 TABLET ORAL at 21:44

## 2023-01-21 RX ADMIN — CYCLOBENZAPRINE HYDROCHLORIDE 10 MILLIGRAM(S): 10 TABLET, FILM COATED ORAL at 21:15

## 2023-01-21 RX ADMIN — LISINOPRIL 10 MILLIGRAM(S): 2.5 TABLET ORAL at 10:02

## 2023-01-21 RX ADMIN — Medication 166.67 MILLIGRAM(S): at 10:03

## 2023-01-21 RX ADMIN — OXYCODONE HYDROCHLORIDE 10 MILLIGRAM(S): 5 TABLET ORAL at 10:02

## 2023-01-21 RX ADMIN — Medication 166.67 MILLIGRAM(S): at 21:16

## 2023-01-21 RX ADMIN — OXYCODONE HYDROCHLORIDE 10 MILLIGRAM(S): 5 TABLET ORAL at 16:04

## 2023-01-21 RX ADMIN — Medication 650 MILLIGRAM(S): at 00:01

## 2023-01-21 RX ADMIN — OXYCODONE HYDROCHLORIDE 10 MILLIGRAM(S): 5 TABLET ORAL at 21:14

## 2023-01-21 RX ADMIN — Medication 166.67 MILLIGRAM(S): at 00:00

## 2023-01-21 RX ADMIN — Medication 650 MILLIGRAM(S): at 01:42

## 2023-01-21 RX ADMIN — Medication 650 MILLIGRAM(S): at 17:19

## 2023-01-21 RX ADMIN — GABAPENTIN 600 MILLIGRAM(S): 400 CAPSULE ORAL at 05:13

## 2023-01-21 NOTE — PROGRESS NOTE ADULT - SUBJECTIVE AND OBJECTIVE BOX
HPI  Patient is a 57y Female who presents to  ED w/ a c/o of 2 weeks of drainage from her surgical site s/p L2-5 lumbar lami/fusion with Dr Schaefer 12/2/22. Patient states she noticed drainage and now burning pain at her surigcal site , Denies Fevers/chills/CP/SOB/palpitations/N/v/Headache/confusion/dizziness/weakness/fatigue. States ability to walk. Denies any bowel/bladder incontinence. Endorses BUE paresthesia which are always present. Denies having any other pain elsewhere. No other orthopedic concerns at this time.    1/17/23 Patient underwent I&D of superficial wound infection-contained to extrafascial plane  1/18/23 Patient comfortable. IVAB continue-awaiting culture results  1/19/23 Patient comfortable-now on IV Vanco and Ceftriaxone  1/20/23 Patient comfortable. Arrangements for PICC line and home IVAB unable to be completed  1/21/23 Patient comfortable. S/P PICC line. Home IVAB starts tomorrow    PMH/PSH:  Lumbar stenosis    Spondylolisthesis    HTN (hypertension)    Peripheral edema    Rheumatoid arthritis    Anxiety and depression    Asthma    Lower back pain    Osteoarthritis of both knees    Joint pain    Obesity    Seasonal allergies    Prolapse of female pelvic organs    Eczema    2019 novel coronavirus disease (COVID-19)    MEDICATIONS  (STANDING):  acetaminophen     Tablet .. 650 milliGRAM(s) Oral every 6 hours  amLODIPine   Tablet 10 milliGRAM(s) Oral daily  cefTRIAXone Injectable. 2000 milliGRAM(s) IV Push every 24 hours  cyclobenzaprine 10 milliGRAM(s) Oral every 8 hours  FLUoxetine 40 milliGRAM(s) Oral daily  gabapentin 600 milliGRAM(s) Oral three times a day  influenza   Vaccine 0.5 milliLiter(s) IntraMuscular once  lactated ringers. 1000 milliLiter(s) (75 mL/Hr) IV Continuous <Continuous>  lactated ringers. 1000 milliLiter(s) (100 mL/Hr) IV Continuous <Continuous>  lactobacillus acidophilus 1 Tablet(s) Oral daily  lisinopril 10 milliGRAM(s) Oral every 12 hours  montelukast 10 milliGRAM(s) Oral at bedtime  multivitamin 1 Tablet(s) Oral daily  predniSONE   Tablet 20 milliGRAM(s) Oral daily  senna 2 Tablet(s) Oral at bedtime  vancomycin  IVPB 1000 milliGRAM(s) IV Intermittent every 12 hours    MEDICATIONS  (PRN):  acetaminophen     Tablet .. 650 milliGRAM(s) Oral every 6 hours PRN Temp greater or equal to 38.5C (101.3F), Mild Pain (1 - 3)  albuterol    90 MICROgram(s) HFA Inhaler 2 Puff(s) Inhalation every 6 hours PRN for shortness of breath and/or wheezing  clonazePAM  Tablet 0.5 milliGRAM(s) Oral two times a day PRN anxiety  diazepam    Tablet 5 milliGRAM(s) Oral every 12 hours PRN muscle spasm  diphenhydrAMINE Injectable 12.5 milliGRAM(s) IV Push every 4 hours PRN Itching  HYDROmorphone  Injectable 0.5 milliGRAM(s) IV Push every 6 hours PRN Severe Pain (7 - 10)  morphine  - Injectable 4 milliGRAM(s) IV Push every 3 hours PRN Severe Pain (7 - 10)  oxyCODONE    IR 10 milliGRAM(s) Oral every 4 hours PRN Moderate Pain (4 - 6)  oxyCODONE    IR 5 milliGRAM(s) Oral every 4 hours PRN Mild Pain (1 - 3)  oxyCODONE    IR 10 milliGRAM(s) Oral every 4 hours PRN Moderate Pain (4 - 6)    Allergies    Levaquin (Hives; Flushing)      PHYSICAL EXAM:    Vital Signs Last 24 Hrs  T(C): 36.4 (21 Jan 2023 08:39), Max: 36.6 (20 Jan 2023 20:22)  T(F): 97.5 (21 Jan 2023 08:39), Max: 97.9 (20 Jan 2023 20:22)  HR: 79 (21 Jan 2023 09:50) (79 - 100)  BP: 135/79 (21 Jan 2023 09:50) (121/66 - 143/73)  BP(mean): 91 (20 Jan 2023 20:22) (91 - 91)  RR: 18 (21 Jan 2023 08:39) (18 - 18)  SpO2: 100% (21 Jan 2023 08:39) (97% - 100%)    Parameters below as of 21 Jan 2023 08:39  Patient On (Oxygen Delivery Method): room air    Patient moving about her room without difficulty-comfortable  Tolerating diet  Voiding  Dressing changed-no active drainage but dressing damp  Incision clean and dry  Neuro without deficits    LABS                                   10.5   7.07  )-----------( 194      ( 18 Jan 2023 08:39 )             33.1     01-18    137  |  106  |  19  ----------------------------<  122<H>  3.9   |  25  |  0.50    Ca    8.8      18 Jan 2023 08:39    Culture - Surgical Swab (01.17.23 @ 15:15)    -  Ampicillin/Sulbactam: R <=8/4    -  Cefazolin: R <=4    -  Clindamycin: S <=0.25    -  Daptomycin: S 1    -  Erythromycin: S <=0.25    -  Gentamicin: R >8 Should not be used as monotherapy    -  Linezolid: S 4    -  Oxacillin: R >2    -  Penicillin: R >8    -  Rifampin: S <=1 Should not be used as monotherapy    -  Tetracycline: S <=1    -  Trimethoprim/Sulfamethoxazole: S <=0.5/9.5    -  Vancomycin: S 2    Specimen Source: .Surgical Swab superficial spine wound #1    Culture Results:   Moderate Methicillin Resistant Staphylococcus aureus    Organism Identification: Methicillin resistant Staphylococcus aureus    Organism: Methicillin resistant Staphylococcus aureus    Method Type: ADI

## 2023-01-21 NOTE — PROGRESS NOTE ADULT - ASSESSMENT
A/P: 57F w/ Surgical site infection s/p L2-5 Lami/PLIF 12/2/22-S/P I&D 1/17/23 with findings of superficial infection    Plan: POD #4  -Admit to ortho spine  -(+) wound cultures for MRSA  -IVAB as per ID  -Analgesia prn  -Medical management.  -Increase OOB/PT    Patient stable for D/C when Home IV therapy arranged

## 2023-01-21 NOTE — PROGRESS NOTE ADULT - SUBJECTIVE AND OBJECTIVE BOX
C/c: poor wound healing.     HPI: 57F, PMH of HTN, anxiety, depression, asthma, lumbar stenosis, obesity, OA, RA, Prosthetic joint infn s/p eileen/spacer and revision,  presented to ED with poorly healing wound from recent lumbar lami/fusion performed on dec 2nd 2022. She was prescribed outpt abx without improvement.   Hospitalist service consulted for medical comanagement/clearance. She has been on prednisone 20mg daily for about a month prior to admission. Given periop steroids.  she underwent I+D 1/17.     1/20/23- up and ambulating. +wound c/s MRSA  1/21/23- PICC line in place, awaiting for home abx delivery    ROS: all 10 systems reviewed and is as above otherwise negative.     Vital Signs Last 24 Hrs  T(C): 36.4 (21 Jan 2023 08:39), Max: 36.6 (20 Jan 2023 20:22)  T(F): 97.5 (21 Jan 2023 08:39), Max: 97.9 (20 Jan 2023 20:22)  HR: 79 (21 Jan 2023 09:50) (79 - 100)  BP: 135/79 (21 Jan 2023 09:50) (121/66 - 143/73)  BP(mean): 91 (20 Jan 2023 20:22) (91 - 91)  RR: 18 (21 Jan 2023 08:39) (18 - 18)  SpO2: 100% (21 Jan 2023 08:39) (97% - 100%)    Parameters below as of 21 Jan 2023 08:39  Patient On (Oxygen Delivery Method): room air    PHYSICAL EXAM:  GENERAL: Comfortable, no acute distress   HEAD:  Normocephalic, atraumatic  EYES: EOMI, PERRLA  HEENT: Moist mucous membranes  NECK: Supple, No JVD  NERVOUS SYSTEM:  Alert & Oriented X3, Motor Strength 5/5 B/L upper and lower extremities  CHEST/LUNG: Clear to auscultation bilaterally  HEART: Regular rate and rhythm  ABDOMEN: Soft, Nontender, Nondistended, Bowel sounds present  GENITOURINARY: Voiding, no palpable bladder  EXTREMITIES:   No clubbing, cyanosis, or edema  MUSCULOSKELTAL- back dressing +  SKIN-no rash    LABS:    20 Jan 2023 08:10    140    |  106    |  10     ----------------------------<  102    3.5     |  30     |  0.44     Ca    9.2        20 Jan 2023 08:10    MEDICATIONS  (STANDING):  acetaminophen     Tablet .. 650 milliGRAM(s) Oral every 6 hours  amLODIPine   Tablet 10 milliGRAM(s) Oral daily  cyclobenzaprine 10 milliGRAM(s) Oral every 8 hours  FLUoxetine 40 milliGRAM(s) Oral daily  gabapentin 600 milliGRAM(s) Oral three times a day  hydrocortisone 1% Cream 1 Application(s) Topical two times a day  influenza   Vaccine 0.5 milliLiter(s) IntraMuscular once  lactated ringers. 1000 milliLiter(s) (75 mL/Hr) IV Continuous <Continuous>  lactated ringers. 1000 milliLiter(s) (100 mL/Hr) IV Continuous <Continuous>  lactobacillus acidophilus 1 Tablet(s) Oral daily  lisinopril 10 milliGRAM(s) Oral every 12 hours  montelukast 10 milliGRAM(s) Oral at bedtime  multivitamin 1 Tablet(s) Oral daily  predniSONE   Tablet 20 milliGRAM(s) Oral daily  senna 2 Tablet(s) Oral at bedtime  vancomycin  IVPB 1250 milliGRAM(s) IV Intermittent every 12 hours    MEDICATIONS  (PRN):  acetaminophen     Tablet .. 650 milliGRAM(s) Oral every 6 hours PRN Temp greater or equal to 38.5C (101.3F), Mild Pain (1 - 3)  albuterol    90 MICROgram(s) HFA Inhaler 2 Puff(s) Inhalation every 6 hours PRN for shortness of breath and/or wheezing  clonazePAM  Tablet 0.5 milliGRAM(s) Oral two times a day PRN anxiety  diazepam    Tablet 5 milliGRAM(s) Oral every 12 hours PRN muscle spasm  diphenhydrAMINE Injectable 12.5 milliGRAM(s) IV Push every 4 hours PRN Itching  HYDROmorphone  Injectable 0.5 milliGRAM(s) IV Push every 6 hours PRN Severe Pain (7 - 10)  morphine  - Injectable 4 milliGRAM(s) IV Push every 3 hours PRN Severe Pain (7 - 10)  oxyCODONE    IR 10 milliGRAM(s) Oral every 4 hours PRN Moderate Pain (4 - 6)  oxyCODONE    IR 5 milliGRAM(s) Oral every 4 hours PRN Mild Pain (1 - 3)  oxyCODONE    IR 10 milliGRAM(s) Oral every 4 hours PRN Moderate Pain (4 - 6)    ASSESSMENT AND PLAN:  57F, pmh as above a/w    1. Recent Lumbar lami/fusion complicated by post op infn:  -pt with h/o post op infns, likely related to underlying immunosuppression with RA/RA treatment.   -s/p I+D 1/7.   -wound c/s +MRSA  -drain removed  -ID following.   -s/p PICC line placement  -physical therapy  -incentive spirometry.     2. Acute blood loss anemia:  -d/t surgery  -no need for transfusion at this time.     3. H/o RA:  -has been on prednisone for at least a month @ 20mg daily.   -s/p iv hydrocortisone 50mg on call to OR/then 25mg Q8H X 3.  -back on prednisone at home dose.    4. HTN:  -continue lisinopril.     5. Asthma:  -prn albuterol.   -singulair.     6. Anxiety/depression:  -continue fluoxetine, prn klonipin.     7. DVT px:  -per primary team.     #Dispo- awaiting for home abx delivery then likely home later on today.

## 2023-01-22 VITALS
RESPIRATION RATE: 18 BRPM | OXYGEN SATURATION: 100 % | SYSTOLIC BLOOD PRESSURE: 149 MMHG | DIASTOLIC BLOOD PRESSURE: 87 MMHG | HEART RATE: 78 BPM | TEMPERATURE: 98 F

## 2023-01-22 LAB
CULTURE RESULTS: SIGNIFICANT CHANGE UP
CULTURE RESULTS: SIGNIFICANT CHANGE UP
SPECIMEN SOURCE: SIGNIFICANT CHANGE UP
SPECIMEN SOURCE: SIGNIFICANT CHANGE UP
VANCOMYCIN TROUGH SERPL-MCNC: 18.4 UG/ML — SIGNIFICANT CHANGE UP (ref 10–20)

## 2023-01-22 RX ADMIN — GABAPENTIN 600 MILLIGRAM(S): 400 CAPSULE ORAL at 14:37

## 2023-01-22 RX ADMIN — Medication 166.67 MILLIGRAM(S): at 10:00

## 2023-01-22 RX ADMIN — Medication 650 MILLIGRAM(S): at 12:26

## 2023-01-22 RX ADMIN — Medication 20 MILLIGRAM(S): at 09:14

## 2023-01-22 RX ADMIN — GABAPENTIN 600 MILLIGRAM(S): 400 CAPSULE ORAL at 06:50

## 2023-01-22 RX ADMIN — Medication 650 MILLIGRAM(S): at 06:50

## 2023-01-22 RX ADMIN — LISINOPRIL 10 MILLIGRAM(S): 2.5 TABLET ORAL at 10:00

## 2023-01-22 RX ADMIN — Medication 650 MILLIGRAM(S): at 12:56

## 2023-01-22 RX ADMIN — AMLODIPINE BESYLATE 10 MILLIGRAM(S): 2.5 TABLET ORAL at 09:13

## 2023-01-22 RX ADMIN — Medication 1 TABLET(S): at 09:14

## 2023-01-22 RX ADMIN — OXYCODONE HYDROCHLORIDE 10 MILLIGRAM(S): 5 TABLET ORAL at 09:44

## 2023-01-22 RX ADMIN — Medication 1 TABLET(S): at 09:13

## 2023-01-22 RX ADMIN — Medication 1 APPLICATION(S): at 10:00

## 2023-01-22 RX ADMIN — CYCLOBENZAPRINE HYDROCHLORIDE 10 MILLIGRAM(S): 10 TABLET, FILM COATED ORAL at 06:50

## 2023-01-22 RX ADMIN — OXYCODONE HYDROCHLORIDE 10 MILLIGRAM(S): 5 TABLET ORAL at 09:14

## 2023-01-22 RX ADMIN — Medication 40 MILLIGRAM(S): at 09:13

## 2023-01-22 NOTE — PROGRESS NOTE ADULT - ASSESSMENT
A/P: 57F w/ Surgical site infection s/p L2-5 Lami/PLIF 12/2/22-S/P I&D 1/17/23 with findings of superficial infection    Plan: POD #4  -Admit to ortho spine  -(+) wound cultures for MRSA  -IVAB as per ID  -Analgesia prn  -Medical management.  -Increase OOB/PT    Patient stable for D/C today

## 2023-01-22 NOTE — PROGRESS NOTE ADULT - SUBJECTIVE AND OBJECTIVE BOX
HPI  Patient is a 57y Female who presents to  ED w/ a c/o of 2 weeks of drainage from her surgical site s/p L2-5 lumbar lami/fusion with Dr Schaefer 12/2/22. Patient states she noticed drainage and now burning pain at her surigcal site , Denies Fevers/chills/CP/SOB/palpitations/N/v/Headache/confusion/dizziness/weakness/fatigue. States ability to walk. Denies any bowel/bladder incontinence. Endorses BUE paresthesia which are always present. Denies having any other pain elsewhere. No other orthopedic concerns at this time.    1/17/23 Patient underwent I&D of superficial wound infection-contained to extrafascial plane  1/18/23 Patient comfortable. IVAB continue-awaiting culture results  1/19/23 Patient comfortable-now on IV Vanco and Ceftriaxone  1/20/23 Patient comfortable. Arrangements for PICC line and home IVAB unable to be completed  1/21/23 Patient comfortable. S/P PICC line. Home IVAB starts tomorrow  1/22/23 Patient comfortable. IV therapy arranged.    PMH/PSH:  Lumbar stenosis    Spondylolisthesis    HTN (hypertension)    Peripheral edema    Rheumatoid arthritis    Anxiety and depression    Asthma    Lower back pain    Osteoarthritis of both knees    Joint pain    Obesity    Seasonal allergies    Prolapse of female pelvic organs    Eczema    2019 novel coronavirus disease (COVID-19)    MEDICATIONS  (STANDING):  acetaminophen     Tablet .. 650 milliGRAM(s) Oral every 6 hours  amLODIPine   Tablet 10 milliGRAM(s) Oral daily  cefTRIAXone Injectable. 2000 milliGRAM(s) IV Push every 24 hours  cyclobenzaprine 10 milliGRAM(s) Oral every 8 hours  FLUoxetine 40 milliGRAM(s) Oral daily  gabapentin 600 milliGRAM(s) Oral three times a day  influenza   Vaccine 0.5 milliLiter(s) IntraMuscular once  lactated ringers. 1000 milliLiter(s) (75 mL/Hr) IV Continuous <Continuous>  lactated ringers. 1000 milliLiter(s) (100 mL/Hr) IV Continuous <Continuous>  lactobacillus acidophilus 1 Tablet(s) Oral daily  lisinopril 10 milliGRAM(s) Oral every 12 hours  montelukast 10 milliGRAM(s) Oral at bedtime  multivitamin 1 Tablet(s) Oral daily  predniSONE   Tablet 20 milliGRAM(s) Oral daily  senna 2 Tablet(s) Oral at bedtime  vancomycin  IVPB 1000 milliGRAM(s) IV Intermittent every 12 hours    MEDICATIONS  (PRN):  acetaminophen     Tablet .. 650 milliGRAM(s) Oral every 6 hours PRN Temp greater or equal to 38.5C (101.3F), Mild Pain (1 - 3)  albuterol    90 MICROgram(s) HFA Inhaler 2 Puff(s) Inhalation every 6 hours PRN for shortness of breath and/or wheezing  clonazePAM  Tablet 0.5 milliGRAM(s) Oral two times a day PRN anxiety  diazepam    Tablet 5 milliGRAM(s) Oral every 12 hours PRN muscle spasm  diphenhydrAMINE Injectable 12.5 milliGRAM(s) IV Push every 4 hours PRN Itching  HYDROmorphone  Injectable 0.5 milliGRAM(s) IV Push every 6 hours PRN Severe Pain (7 - 10)  morphine  - Injectable 4 milliGRAM(s) IV Push every 3 hours PRN Severe Pain (7 - 10)  oxyCODONE    IR 10 milliGRAM(s) Oral every 4 hours PRN Moderate Pain (4 - 6)  oxyCODONE    IR 5 milliGRAM(s) Oral every 4 hours PRN Mild Pain (1 - 3)  oxyCODONE    IR 10 milliGRAM(s) Oral every 4 hours PRN Moderate Pain (4 - 6)    Allergies    Levaquin (Hives; Flushing)      PHYSICAL EXAM:    Vital Signs Last 24 Hrs  T(C): 36.6 (22 Jan 2023 07:30), Max: 36.6 (22 Jan 2023 07:30)  T(F): 97.9 (22 Jan 2023 07:30), Max: 97.9 (22 Jan 2023 07:30)  HR: 78 (22 Jan 2023 07:30) (77 - 94)  BP: 149/87 (22 Jan 2023 07:30) (133/90 - 149/87)  BP(mean): 109 (21 Jan 2023 21:07) (109 - 109)  RR: 18 (22 Jan 2023 07:30) (18 - 18)  SpO2: 100% (22 Jan 2023 07:30) (99% - 100%)    Parameters below as of 22 Jan 2023 07:30  Patient On (Oxygen Delivery Method): room air    Patient moving about her room without difficulty-comfortable  Tolerating diet  Voiding  Dressing clean and dry  Neuro without deficits    LABS                                   10.5   7.07  )-----------( 194      ( 18 Jan 2023 08:39 )             33.1     01-18    137  |  106  |  19  ----------------------------<  122<H>  3.9   |  25  |  0.50    Ca    8.8      18 Jan 2023 08:39    Culture - Surgical Swab (01.17.23 @ 15:15)    -  Ampicillin/Sulbactam: R <=8/4    -  Cefazolin: R <=4    -  Clindamycin: S <=0.25    -  Daptomycin: S 1    -  Erythromycin: S <=0.25    -  Gentamicin: R >8 Should not be used as monotherapy    -  Linezolid: S 4    -  Oxacillin: R >2    -  Penicillin: R >8    -  Rifampin: S <=1 Should not be used as monotherapy    -  Tetracycline: S <=1    -  Trimethoprim/Sulfamethoxazole: S <=0.5/9.5    -  Vancomycin: S 2    Specimen Source: .Surgical Swab superficial spine wound #1    Culture Results:   Moderate Methicillin Resistant Staphylococcus aureus    Organism Identification: Methicillin resistant Staphylococcus aureus    Organism: Methicillin resistant Staphylococcus aureus    Method Type: ADI

## 2023-01-22 NOTE — PROGRESS NOTE ADULT - REASON FOR ADMISSION
Surgical site infection s/p Lumbar decompression/fusion 12/2/22

## 2023-01-22 NOTE — PROGRESS NOTE ADULT - PROVIDER SPECIALTY LIST ADULT
Hospitalist
Hospitalist
Infectious Disease
Infectious Disease
Orthopedics
Hospitalist
Hospitalist
Orthopedics

## 2023-01-23 LAB
CULTURE RESULTS: SIGNIFICANT CHANGE UP
CULTURE RESULTS: SIGNIFICANT CHANGE UP
ORGANISM # SPEC MICROSCOPIC CNT: SIGNIFICANT CHANGE UP
ORGANISM # SPEC MICROSCOPIC CNT: SIGNIFICANT CHANGE UP
SPECIMEN SOURCE: SIGNIFICANT CHANGE UP
SPECIMEN SOURCE: SIGNIFICANT CHANGE UP
SURGICAL PATHOLOGY STUDY: SIGNIFICANT CHANGE UP
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
## 2023-01-26 DIAGNOSIS — D62 ACUTE POSTHEMORRHAGIC ANEMIA: ICD-10-CM

## 2023-01-26 DIAGNOSIS — F32.A DEPRESSION, UNSPECIFIED: ICD-10-CM

## 2023-01-26 DIAGNOSIS — T81.41XA INFECTION FOLLOWING A PROCEDURE, SUPERFICIAL INCISIONAL SURGICAL SITE, INITIAL ENCOUNTER: ICD-10-CM

## 2023-01-26 DIAGNOSIS — Z90.49 ACQUIRED ABSENCE OF OTHER SPECIFIED PARTS OF DIGESTIVE TRACT: ICD-10-CM

## 2023-01-26 DIAGNOSIS — E66.9 OBESITY, UNSPECIFIED: ICD-10-CM

## 2023-01-26 DIAGNOSIS — Z79.899 OTHER LONG TERM (CURRENT) DRUG THERAPY: ICD-10-CM

## 2023-01-26 DIAGNOSIS — Z96.652 PRESENCE OF LEFT ARTIFICIAL KNEE JOINT: ICD-10-CM

## 2023-01-26 DIAGNOSIS — Z98.1 ARTHRODESIS STATUS: ICD-10-CM

## 2023-01-26 DIAGNOSIS — I10 ESSENTIAL (PRIMARY) HYPERTENSION: ICD-10-CM

## 2023-01-26 DIAGNOSIS — Z90.710 ACQUIRED ABSENCE OF BOTH CERVIX AND UTERUS: ICD-10-CM
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
## 2023-01-26 DIAGNOSIS — Z86.16 PERSONAL HISTORY OF COVID-19: ICD-10-CM

## 2023-01-26 DIAGNOSIS — I96 GANGRENE, NOT ELSEWHERE CLASSIFIED: ICD-10-CM

## 2023-01-26 DIAGNOSIS — Z20.822 CONTACT WITH AND (SUSPECTED) EXPOSURE TO COVID-19: ICD-10-CM

## 2023-01-26 DIAGNOSIS — B95.62 METHICILLIN RESISTANT STAPHYLOCOCCUS AUREUS INFECTION AS THE CAUSE OF DISEASES CLASSIFIED ELSEWHERE: ICD-10-CM

## 2023-01-26 DIAGNOSIS — J45.909 UNSPECIFIED ASTHMA, UNCOMPLICATED: ICD-10-CM

## 2023-01-26 DIAGNOSIS — Z88.1 ALLERGY STATUS TO OTHER ANTIBIOTIC AGENTS STATUS: ICD-10-CM

## 2023-01-26 DIAGNOSIS — M17.11 UNILATERAL PRIMARY OSTEOARTHRITIS, RIGHT KNEE: ICD-10-CM

## 2023-01-26 DIAGNOSIS — F41.9 ANXIETY DISORDER, UNSPECIFIED: ICD-10-CM

## 2023-01-26 DIAGNOSIS — T81.31XA DISRUPTION OF EXTERNAL OPERATION (SURGICAL) WOUND, NOT ELSEWHERE CLASSIFIED, INITIAL ENCOUNTER: ICD-10-CM

## 2023-01-26 DIAGNOSIS — M06.9 RHEUMATOID ARTHRITIS, UNSPECIFIED: ICD-10-CM

## 2023-01-26 DIAGNOSIS — D84.821 IMMUNODEFICIENCY DUE TO DRUGS: ICD-10-CM

## 2023-02-15 LAB
CULTURE RESULTS: SIGNIFICANT CHANGE UP
CULTURE RESULTS: SIGNIFICANT CHANGE UP
SPECIMEN SOURCE: SIGNIFICANT CHANGE UP
SPECIMEN SOURCE: SIGNIFICANT CHANGE UP

## 2023-05-25 NOTE — PROVIDER CONTACT NOTE (OTHER) - ACTION/TREATMENT ORDERED:
MD made aware, will inform Spine team. To be evaluated in AM.
MD notified. Will inform Dr Schaefer.
No
Patient with vomiting unable to tolerate PO.

## 2023-08-14 ENCOUNTER — APPOINTMENT (OUTPATIENT)
Dept: DERMATOLOGY | Facility: CLINIC | Age: 58
End: 2023-08-14
Payer: MEDICARE

## 2023-08-14 PROCEDURE — 17000 DESTRUCT PREMALG LESION: CPT | Mod: 59

## 2023-08-14 PROCEDURE — 99202 OFFICE O/P NEW SF 15 MIN: CPT | Mod: 25

## 2023-08-14 PROCEDURE — 17110 DESTRUCTION B9 LES UP TO 14: CPT

## 2023-10-13 NOTE — BRIEF OPERATIVE NOTE - NSICDXBRIEFPROCEDURE_GEN_ALL_CORE_FT
PROCEDURES:  Posterior laminectomy of lumbar spine 02-Dec-2022 14:45:13  Vasyl Schaefer  Fusion, spine, lumbar, posterolateral approach 02-Dec-2022 14:45:35  Vasyl Schaefer  
 used

## 2023-10-17 NOTE — CDI QUERY NOTE - NSCDI_DOCCLARIFY_GEN_ALL_CORE_FT
No In responding to this request, please exercise your independent professional judgment.  The fact that a question is asked does not imply that any particular answer is desired or expected.

## 2024-01-12 NOTE — PATIENT PROFILE ADULT - HAVE YOU RECEIVED AT LEAST TWO PFIZER AND/OR MODERNA VACCINATIONS (IN ANY COMBINATION) AND/OR ONE JOHNSON & JOHNSON VACCINATION?
In an effort to ensure that our patients [employer], a Team Member has reviewed your chart and identified an opportunity to provide the best care possible. An attempt was made to discuss or schedule overdue Preventive or Disease Management screening.     The Outcome was Contact was not made, left messageIf you have any questions or need help with scheduling, contact our Health Outreach Team at [phone]. Care Gaps include Colorectal Cancer Screening and Wellness Visits.     Yes

## 2024-05-22 NOTE — H&P PST ADULT - GASTROINTESTINAL
Subjective:       Patient ID: Ramón Leslie Jr. is a 78 y.o. male.    Chief Complaint: Follow-up (Refills needed)      Ramón Leslie Jr. is a 78 y.o. male with HTN, polycythemia who presents to clinic for medication refill. Ran out of his Lisinopril about 2 days ago. Reports he monitors his blood pressure at home regularly. Following closely with Dr. Miller with heme/ onc for his polycythemia. Labs updated with heme/ onc. Lipid panel and PSA are up to date.         Review of Systems   Constitutional:  Negative for fatigue.   Eyes:  Negative for visual disturbance.   Respiratory:  Negative for shortness of breath.    Cardiovascular:  Negative for chest pain.   Neurological:  Negative for dizziness and headaches.         Past Medical History:   Diagnosis Date    Heart attack 10/30/2019    [facility]    Hypertension     Polio     age 1    Polycythemia     Primary hypertension 8/1/2016       Review of patient's allergies indicates:   Allergen Reactions    Penicillins Rash         Current Outpatient Medications:     aspirin (ECOTRIN) 81 MG EC tablet, Take 81 mg by mouth once daily., Disp: , Rfl:     COQ10, UBIQUINOL, ORAL, Take 1 capsule by mouth once daily., Disp: , Rfl:     cyanocobalamin (VITAMIN B-12) 1000 MCG tablet, Take 100 mcg by mouth once daily., Disp: , Rfl:     hydroxyurea (HYDREA) 500 mg Cap, TAKE 1 CAPSULE BY MOUTH EVERY DAY, Disp: 90 capsule, Rfl: 1    ipratropium (ATROVENT) 21 mcg (0.03 %) nasal spray, SPRAY 2 SPRAYS BY EACH NOSTRIL ROUTE 3 (THREE) TIMES DAILY AS NEEDED FOR RHINITIS., Disp: 30 mL, Rfl: 0    ketorolac 0.5% (ACULAR) 0.5 % Drop, Place into both eyes., Disp: , Rfl:     KRILL OIL ORAL, Take 500 mg by mouth once daily. , Disp: , Rfl:     multivitamin (THERAGRAN) per tablet, Take 1 tablet by mouth once daily., Disp: , Rfl:     niacin 400 mg CpSR, Take 1 capsule by mouth once daily., Disp: , Rfl:     nitroGLYCERIN (NITROSTAT) 0.4 MG SL tablet, Take 1 tablet by mouth as needed.,  "Disp: , Rfl:     ofloxacin (OCUFLOX) 0.3 % ophthalmic solution, Place into the right eye., Disp: , Rfl:     prednisoLONE acetate (PRED FORTE) 1 % DrpS, Place into both eyes., Disp: , Rfl:     rosuvastatin (CRESTOR) 20 MG tablet, TAKE 1 TABLET BY MOUTH EVERY DAY, Disp: 90 tablet, Rfl: 2    vit C/vit E acet/lutein/min (OCUVITE LUTEIN ORAL), Take 1 tablet by mouth once daily., Disp: , Rfl:     vitamin D 1000 units Tab, Take 185 mg by mouth once daily., Disp: , Rfl:     vitamin E 400 UNIT capsule, Take 400 Units by mouth once daily., Disp: , Rfl:     folic acid (FOLVITE) 1 MG tablet, Take 1 mg by mouth once daily. (Patient not taking: Reported on 5/22/2024), Disp: , Rfl:     lisinopriL (PRINIVIL,ZESTRIL) 40 MG tablet, Take 1 tablet (40 mg total) by mouth once daily., Disp: 90 tablet, Rfl: 3    Objective:        Physical Exam  Vitals reviewed.   Constitutional:       General: He is not in acute distress.     Appearance: Normal appearance.   HENT:      Head: Normocephalic and atraumatic.   Eyes:      Conjunctiva/sclera: Conjunctivae normal.   Cardiovascular:      Rate and Rhythm: Normal rate and regular rhythm.      Heart sounds: Normal heart sounds.   Pulmonary:      Effort: Pulmonary effort is normal.      Breath sounds: Normal breath sounds.   Musculoskeletal:         General: Normal range of motion.      Cervical back: Normal range of motion and neck supple.   Skin:     General: Skin is warm and dry.   Neurological:      Mental Status: He is alert and oriented to person, place, and time.   Psychiatric:         Mood and Affect: Mood normal.         Behavior: Behavior normal.         Thought Content: Thought content normal.         Judgment: Judgment normal.           Visit Vitals  BP (!) 164/70 (BP Location: Right arm, Patient Position: Sitting, BP Method: Small (Manual))   Pulse 74   Resp 16   Ht 5' 8" (1.727 m)   Wt 57.7 kg (127 lb 3.3 oz)   SpO2 96%   BMI 19.34 kg/m²      Assessment:         1. Pure " hypercholesterolemia    2. Coronary artery disease involving native coronary artery of native heart without angina pectoris    3. Essential hypertension        Plan:         Ramón was seen today for follow-up.    Diagnoses and all orders for this visit:    Pure hypercholesterolemia      -    Stable on lipid panel.     Coronary artery disease involving native coronary artery of native heart without angina pectoris       -   On ASA. Cholesterol is well controlled.    Essential hypertension  -     lisinopriL (PRINIVIL,ZESTRIL) 40 MG tablet; Take 1 tablet (40 mg total) by mouth once daily.       Follow up annually with PCP or sooner if needed.        Family Medicine Physician Assistant     Future Appointments       Date Provider Specialty Appt Notes    6/13/2024 Marissa Miller MD Hematology and Oncology Polycythemia/labs/2mofu             Tests to Keep You Healthy    Last Blood Pressure <= 139/89 (5/22/2024): NO       I spent a total of 15 minutes on the day of the visit.This includes face to face time and non-face to face time preparing to see the patient (eg, review of tests), obtaining and/or reviewing separately obtained history, documenting clinical information in the electronic or other health record, independently interpreting results and communicating results to the patient/family/caregiver, or care coordinator.     negative normal/soft/nontender/nondistended/normal active bowel sounds

## 2024-08-27 ENCOUNTER — OUTPATIENT (OUTPATIENT)
Dept: OUTPATIENT SERVICES | Facility: HOSPITAL | Age: 59
LOS: 1 days | End: 2024-08-27
Payer: MEDICARE

## 2024-08-27 VITALS
OXYGEN SATURATION: 100 % | TEMPERATURE: 98 F | RESPIRATION RATE: 18 BRPM | WEIGHT: 233.91 LBS | DIASTOLIC BLOOD PRESSURE: 82 MMHG | HEART RATE: 93 BPM | HEIGHT: 66 IN | SYSTOLIC BLOOD PRESSURE: 156 MMHG

## 2024-08-27 DIAGNOSIS — Z01.818 ENCOUNTER FOR OTHER PREPROCEDURAL EXAMINATION: ICD-10-CM

## 2024-08-27 DIAGNOSIS — Z98.1 ARTHRODESIS STATUS: Chronic | ICD-10-CM

## 2024-08-27 DIAGNOSIS — Z90.49 ACQUIRED ABSENCE OF OTHER SPECIFIED PARTS OF DIGESTIVE TRACT: Chronic | ICD-10-CM

## 2024-08-27 DIAGNOSIS — Z98.890 OTHER SPECIFIED POSTPROCEDURAL STATES: Chronic | ICD-10-CM

## 2024-08-27 DIAGNOSIS — Z90.710 ACQUIRED ABSENCE OF BOTH CERVIX AND UTERUS: Chronic | ICD-10-CM

## 2024-08-27 DIAGNOSIS — Z96.652 PRESENCE OF LEFT ARTIFICIAL KNEE JOINT: Chronic | ICD-10-CM

## 2024-08-27 LAB
ALBUMIN SERPL ELPH-MCNC: 3.6 G/DL — SIGNIFICANT CHANGE UP (ref 3.3–5)
ANION GAP SERPL CALC-SCNC: 4 MMOL/L — LOW (ref 5–17)
ANISOCYTOSIS BLD QL: SLIGHT — SIGNIFICANT CHANGE UP
APPEARANCE UR: CLEAR — SIGNIFICANT CHANGE UP
BASOPHILS # BLD AUTO: 0.02 K/UL — SIGNIFICANT CHANGE UP (ref 0–0.2)
BASOPHILS NFR BLD AUTO: 0.1 % — SIGNIFICANT CHANGE UP (ref 0–2)
BILIRUB UR-MCNC: NEGATIVE — SIGNIFICANT CHANGE UP
BLD GP AB SCN SERPL QL: SIGNIFICANT CHANGE UP
BUN SERPL-MCNC: 20 MG/DL — SIGNIFICANT CHANGE UP (ref 7–23)
CALCIUM SERPL-MCNC: 10 MG/DL — SIGNIFICANT CHANGE UP (ref 8.5–10.1)
CHLORIDE SERPL-SCNC: 106 MMOL/L — SIGNIFICANT CHANGE UP (ref 96–108)
CO2 SERPL-SCNC: 26 MMOL/L — SIGNIFICANT CHANGE UP (ref 22–31)
COLOR SPEC: YELLOW — SIGNIFICANT CHANGE UP
CREAT SERPL-MCNC: 0.8 MG/DL — SIGNIFICANT CHANGE UP (ref 0.5–1.3)
DIFF PNL FLD: NEGATIVE — SIGNIFICANT CHANGE UP
EGFR: 84 ML/MIN/1.73M2 — SIGNIFICANT CHANGE UP
EOSINOPHIL # BLD AUTO: 0.01 K/UL — SIGNIFICANT CHANGE UP (ref 0–0.5)
EOSINOPHIL NFR BLD AUTO: 0.1 % — SIGNIFICANT CHANGE UP (ref 0–6)
GLUCOSE SERPL-MCNC: 140 MG/DL — HIGH (ref 70–99)
GLUCOSE UR QL: NEGATIVE MG/DL — SIGNIFICANT CHANGE UP
HCT VFR BLD CALC: 43 % — SIGNIFICANT CHANGE UP (ref 34.5–45)
HGB BLD-MCNC: 14 G/DL — SIGNIFICANT CHANGE UP (ref 11.5–15.5)
IMM GRANULOCYTES NFR BLD AUTO: 0.7 % — SIGNIFICANT CHANGE UP (ref 0–0.9)
KETONES UR-MCNC: NEGATIVE MG/DL — SIGNIFICANT CHANGE UP
LEUKOCYTE ESTERASE UR-ACNC: NEGATIVE — SIGNIFICANT CHANGE UP
LYMPHOCYTES # BLD AUTO: 0.73 K/UL — LOW (ref 1–3.3)
LYMPHOCYTES # BLD AUTO: 5.5 % — LOW (ref 13–44)
MANUAL SMEAR VERIFICATION: SIGNIFICANT CHANGE UP
MCHC RBC-ENTMCNC: 27.9 PG — SIGNIFICANT CHANGE UP (ref 27–34)
MCHC RBC-ENTMCNC: 32.6 GM/DL — SIGNIFICANT CHANGE UP (ref 32–36)
MCV RBC AUTO: 85.7 FL — SIGNIFICANT CHANGE UP (ref 80–100)
MICROCYTES BLD QL: SLIGHT — SIGNIFICANT CHANGE UP
MONOCYTES # BLD AUTO: 0.54 K/UL — SIGNIFICANT CHANGE UP (ref 0–0.9)
MONOCYTES NFR BLD AUTO: 4 % — SIGNIFICANT CHANGE UP (ref 2–14)
NEUTROPHILS # BLD AUTO: 11.99 K/UL — HIGH (ref 1.8–7.4)
NEUTROPHILS NFR BLD AUTO: 89.6 % — HIGH (ref 43–77)
NITRITE UR-MCNC: NEGATIVE — SIGNIFICANT CHANGE UP
OVALOCYTES BLD QL SMEAR: SLIGHT — SIGNIFICANT CHANGE UP
PH UR: 5 — SIGNIFICANT CHANGE UP (ref 5–8)
PLAT MORPH BLD: NORMAL — SIGNIFICANT CHANGE UP
PLATELET # BLD AUTO: 192 K/UL — SIGNIFICANT CHANGE UP (ref 150–400)
POIKILOCYTOSIS BLD QL AUTO: SLIGHT — SIGNIFICANT CHANGE UP
POTASSIUM SERPL-MCNC: 4.4 MMOL/L — SIGNIFICANT CHANGE UP (ref 3.5–5.3)
POTASSIUM SERPL-SCNC: 4.4 MMOL/L — SIGNIFICANT CHANGE UP (ref 3.5–5.3)
PROT UR-MCNC: SIGNIFICANT CHANGE UP MG/DL
RBC # BLD: 5.02 M/UL — SIGNIFICANT CHANGE UP (ref 3.8–5.2)
RBC # FLD: 13.2 % — SIGNIFICANT CHANGE UP (ref 10.3–14.5)
RBC BLD AUTO: ABNORMAL
SODIUM SERPL-SCNC: 136 MMOL/L — SIGNIFICANT CHANGE UP (ref 135–145)
SP GR SPEC: 1.03 — SIGNIFICANT CHANGE UP (ref 1–1.03)
STOMATOCYTES BLD QL SMEAR: SLIGHT — SIGNIFICANT CHANGE UP
UROBILINOGEN FLD QL: 0.2 MG/DL — SIGNIFICANT CHANGE UP (ref 0.2–1)
WBC # BLD: 13.39 K/UL — HIGH (ref 3.8–10.5)
WBC # FLD AUTO: 13.39 K/UL — HIGH (ref 3.8–10.5)

## 2024-08-27 PROCEDURE — 86900 BLOOD TYPING SEROLOGIC ABO: CPT

## 2024-08-27 PROCEDURE — 86803 HEPATITIS C AB TEST: CPT

## 2024-08-27 PROCEDURE — 85610 PROTHROMBIN TIME: CPT

## 2024-08-27 PROCEDURE — 87640 STAPH A DNA AMP PROBE: CPT

## 2024-08-27 PROCEDURE — 83036 HEMOGLOBIN GLYCOSYLATED A1C: CPT

## 2024-08-27 PROCEDURE — 85025 COMPLETE CBC W/AUTO DIFF WBC: CPT

## 2024-08-27 PROCEDURE — 36415 COLL VENOUS BLD VENIPUNCTURE: CPT

## 2024-08-27 PROCEDURE — 93010 ELECTROCARDIOGRAM REPORT: CPT

## 2024-08-27 PROCEDURE — 82040 ASSAY OF SERUM ALBUMIN: CPT

## 2024-08-27 PROCEDURE — 99214 OFFICE O/P EST MOD 30 MIN: CPT | Mod: 25

## 2024-08-27 PROCEDURE — 93005 ELECTROCARDIOGRAM TRACING: CPT

## 2024-08-27 PROCEDURE — 71046 X-RAY EXAM CHEST 2 VIEWS: CPT | Mod: 26

## 2024-08-27 PROCEDURE — 87641 MR-STAPH DNA AMP PROBE: CPT

## 2024-08-27 PROCEDURE — 85730 THROMBOPLASTIN TIME PARTIAL: CPT

## 2024-08-27 PROCEDURE — 80048 BASIC METABOLIC PNL TOTAL CA: CPT

## 2024-08-27 PROCEDURE — 81003 URINALYSIS AUTO W/O SCOPE: CPT

## 2024-08-27 PROCEDURE — 86850 RBC ANTIBODY SCREEN: CPT

## 2024-08-27 PROCEDURE — 86901 BLOOD TYPING SEROLOGIC RH(D): CPT

## 2024-08-27 PROCEDURE — 71046 X-RAY EXAM CHEST 2 VIEWS: CPT

## 2024-08-27 NOTE — H&P PST ADULT - NSICDXPASTSURGICALHX_GEN_ALL_CORE_FT
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 Replaced after 9 months    History of cholecystectomy 2018    History of hysterectomy at 33 years old    Status post creation of urethral sling by suprapubic approach at 33 years old     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 Replaced after 9 months    History of cholecystectomy 2018    History of hysterectomy at 33 years old    S/P lumbar spinal fusion     Status post creation of urethral sling by suprapubic approach at 33 years old

## 2024-08-27 NOTE — H&P PST ADULT - PATIENT'S SEXUAL ORIENTATION
Heterosexual Consent (Scalp)/Introductory Paragraph: The rationale for Mohs was explained to the patient and consent was obtained. The risks, benefits and alternatives to therapy were discussed in detail. Specifically, the risks of changes in hair growth pattern secondary to repair, infection, scarring, bleeding, prolonged wound healing, incomplete removal, allergy to anesthesia, nerve injury and recurrence were addressed. Prior to the procedure, the treatment site was clearly identified and confirmed by the patient. All components of Universal Protocol/PAUSE Rule completed.

## 2024-08-27 NOTE — H&P PST ADULT - HISTORY OF PRESENT ILLNESS
57 years old female with spinal stenosis and spondylolisthesis. Reports constant aching and at times sharp pain to mid lumbar spine radiating to bilateral feet. Numbness and tingling to bilateral legs. Reports pain for "a few years " which has gotten  worst in the last year.  Four falls since Summer 2020. Last fell 11/16/2020 with trauma to left palm of hand resulting in 12 sutures to site. Falls due to weakness in left lower extremity. Denies changes in bowel or urinary habits. Pain medications for pain. Planned bilateral laminectomy, fusion and instrumentation L2-5. 59 years old female with spinal stenosis and spondylolisthesis, S/P lumbar fusion 2 yrs ago. Reports constant aching of lower back, pt states "it ends in the coccyx area". c/o Numbness and tingling to bilateral legs, denies changes in bowel or urinary habits. She takes pain medications with temporary pain relief. She is scheduled for . Planned bilateral laminectomy, revision laminectomy, exploration fusion , removal instrumentation, posterior fusion, possible interbody fusion. fusion and instrumentation L2-5. 59 years old female with spinal stenosis and spondylolisthesis, S/P lumbar fusion 2 yrs ago. Reports constant aching of lower back, pt states "it ends in the coccyx area". c/o Numbness and tingling to bilateral legs, denies changes in bowel or urinary habits. She takes pain medications with temporary pain relief. She is scheduled for bilateral laminectomy, revision laminectomy, exploration fusion , removal instrumentation, posterior fusion, possible interbody fusion. fusion and instrumentation L2-5.

## 2024-08-27 NOTE — H&P PST ADULT - ASSESSMENT
59 years old female with spinal stenosis and spondylolisthesis, S/P lumbar fusion 2 yrs ago. She is scheduled for bilateral laminectomy, revision laminectomy, exploration fusion , removal instrumentation, posterior fusion, possible interbody fusion. fusion and instrumentation L2-5.  Plan  1. Stop all NSAIDS, herbal supplements and vitamins for 7 days.  2. NPO as per ASU instructions  3. Take the following medications ( Prednisone, Oxycodone, Amlodipine, ) with small sips of water on the morning of your procedure/surgery.  4. Use EZ sponges as directed  5. Use mupirocin as directed  6. Labs, EKG, CXR as per surgeon.   7. PMD visit for optimization prior to surgery as per surgeon.  8. Hold Lisinopril the morning of surgery.    Spine surgery reading material given to patient.    CAPRINI SCORE Version 2013    AGE RELATED RISK FACTORS                                                             [ ] Age 41-60 years             [1 point]  [ x] Age: 61-74 years            [2 points]                 [ ] Age 75 years or over     [3 points]             DISEASE RELATED RISK FACTORS                                                       [ ] Current swollen legs (pitting edema of any level)     [1 point]                     [ ] Varicose veins (visible, bulging vein, not spider veins or surgically removed veins)   [1 point]                                 [x ] BMI > 25 Kg/m2                       [1 point]  [ ] BMI > 40 kg/m2                       [1 point]                                 [ ] Serious infection (within past month, requiring hospitalization and IV antibiotics)    [1 point]                     [ ] Lung disease ( interstitial: COPD, emphysema, sarcoid, 9-11 illness, NOT asthma)       [1 point]                                                                          [ ] Acute myocardial infarction (within past month)      [1 point]                  [ ] Congestive heart failure (episode within past month or taking CHF meds)                   [1 point]         [ ] Inflammatory bowel disease (Crohns disease or ulcerative colitis, not irritable bowel syndrome)   [1 point]                  [ ] Central venous access, PICC line or Port (within past month)     [2 points]                                                             [ ] Stroke (within past month)     [5 points]    [ ] Previous or present malignancy (includes melanoma but not basal cell carcinoma, each incidence of cancer scores 2 points-not metastases)  [2 points]                                                                                                                                                         HEMATOLOGY RELATED FACTORS                                                         [ ] History of DVT or PE (including superficial venous thrombosis)    [3 points]                    [ ] Positive family history for DVT or PE (includes first-, second-, and third-degree relatives)   [3 points]   [ ] Personal or family history of genetic thrombophilia (family history counts only if it has not been confirmed that patient does not have this genetic marker)                       [ ] Prothrombin 52843K mutation                   [3 points]                           [ ] Factor V Leiden                                               [3 points]            [ ] Antithrombin III deficiency                           [3 points]         [ ] Protein C & S deficiency                                [3 points]              [ ] Dysfibrinogenemia                                         [3 points]  [ ] Personal history of acquired thrombophilia                       [ ] Lupus anticoagulant                                       [3 points]                                                                  [ ] Anticardiolipin antibodies                             [3 points]              [ ] Antiphospholipid antibodies                         [3 points]                                                         [ ] High homocysteine in the blood                  [3 points]                                                    [ ] Myeloproliferative disorders (including thrombocytosis)    [3 points]            [ ] HIV                                                                     [3 points]                                                    [ ] Heparin induced thrombocytopenia            [3 points]                                        MOBILITY RELATED FACTORS  [ ] Bed rest or restricted mobility (inability to ambulate 30 feet continuously or removable leg brace) for less than 72 hours    [1 point]  [ ] Nonremovable plaster cast or mold that prevents calf muscle use   [2 points]  [ ] Bed bound  or restricted mobility for more than 72 hours                  [2 points]    GENDER SPECIFIC FACTORS  [ ] Pregnancy or had a baby within the last month   [1 point]  [ ] Hormone therapy  or oral contraception               [1 point]  [ ] Current use of estrogen-like drugs (raloxifine, tamoxifen, anastrozole, letrozole)                                [1 point]  [ ] History of unexplained stillborn infant, premature birth with toxemia or growth-restricted infant   [1 point]  [ ] Recurrent spontaneous abortions (3 or more)      [1 point]    OTHER RISK FACTORS                                         [ ] Current smoker (includes vaping and smoking marijuana)      [1 point]  [ ] Diabetes requiring insulin     [1 point]                   [ ] Chemotherapy (includes methotrexate for rheumatoid arthritis, hydroxyurea for thrombocytosis)    [1 point]  [ ] Blood transfusion(s)               [1 point]    SURGERY RELATED RISK FACTORS  [ ]  section within the last month                    [1 point]  [ ] Minor surgery is planned (less than 45 minutes)     [1 point]  [ ] Past major surgery (longer than 45 minutes) within past month  [2 points]  [ x] Planned major surgery lasting more than 45 minutes (includes laparoscopic and arthroscopic; do not add to the "5" for hip and knee replacement)    [2 points]  [x ] Length of surgery over 2 hours (includes anesthesia time; do not add to the "5" for hip and knee replacement)   [1 point]  [ ] Elective hip or knee joint replacement surgery         [5 points]                                               TRAUMA RELATED RISK FACTORS  [ ] Fracture of the hip, pelvis, or leg                       [5 points]  [ ] Spinal cord injury resulting in paralysis (within the past month)    [5 points]  [ ] Paralysis  (within the past month)                      [5 points]  [ ] Multiple trauma (within the past month)         [5 Points]    Total Score [    6    ]    Total hip and total knee replacement:  Caprini score 9 or less: LOW risk  Caprini score 10 or highter: HIGH RISK    Caprini score 0-2: Low Risk, NO VTE prophylaxis required for most patients, encourage ambulation  Caprini score 3-6: Moderate Risk , pharmacologic VTE prophylaxis is indicated for most patients (in the absence of contraindications)  Caprini score 7 or higher: High risk, pharmocologic VTE prophylaxis indicated for most patients (in the absence of contraindications)

## 2024-08-28 DIAGNOSIS — Z01.818 ENCOUNTER FOR OTHER PREPROCEDURAL EXAMINATION: ICD-10-CM

## 2024-08-28 LAB
A1C WITH ESTIMATED AVERAGE GLUCOSE RESULT: 6.2 % — HIGH (ref 4–5.6)
ESTIMATED AVERAGE GLUCOSE: 131 MG/DL — HIGH (ref 68–114)
HCV AB S/CO SERPL IA: 0.47 S/CO — SIGNIFICANT CHANGE UP (ref 0–0.99)
HCV AB SERPL-IMP: SIGNIFICANT CHANGE UP
MRSA PCR RESULT.: DETECTED
S AUREUS DNA NOSE QL NAA+PROBE: DETECTED

## 2024-08-28 NOTE — CHART NOTE - NSCHARTNOTEFT_GEN_A_CORE
MRSA and MSSA detected from nasal swab done in PST on 8/27/24 . Patient instructed to apply Mupirocin to nostrils 2 times per day for 5 days starting 9/1/24. Patient was able to verbalize instructions. Questions answered. MRSA and MSSA detected from nasal swab done in PST on 8/27/24. Patient instructed to apply Mupirocin to nostrils 2 times per day for 5 days starting 9/1/24. Patient was able to verbalize instructions. Questions answered. Call made to surgeon regarding results and the need for Vancomycin IV on the day of surgery.

## 2024-09-06 ENCOUNTER — INPATIENT (INPATIENT)
Facility: HOSPITAL | Age: 59
LOS: 3 days | Discharge: HOME HEALTH | DRG: 458 | End: 2024-09-10
Attending: ORTHOPAEDIC SURGERY | Admitting: ORTHOPAEDIC SURGERY
Payer: MEDICARE

## 2024-09-06 VITALS
HEIGHT: 66 IN | TEMPERATURE: 98 F | RESPIRATION RATE: 16 BRPM | HEART RATE: 74 BPM | OXYGEN SATURATION: 99 % | WEIGHT: 234.79 LBS | DIASTOLIC BLOOD PRESSURE: 88 MMHG | SYSTOLIC BLOOD PRESSURE: 138 MMHG

## 2024-09-06 DIAGNOSIS — Z98.890 OTHER SPECIFIED POSTPROCEDURAL STATES: Chronic | ICD-10-CM

## 2024-09-06 DIAGNOSIS — Z96.652 PRESENCE OF LEFT ARTIFICIAL KNEE JOINT: Chronic | ICD-10-CM

## 2024-09-06 DIAGNOSIS — Z98.1 ARTHRODESIS STATUS: Chronic | ICD-10-CM

## 2024-09-06 DIAGNOSIS — Z90.49 ACQUIRED ABSENCE OF OTHER SPECIFIED PARTS OF DIGESTIVE TRACT: Chronic | ICD-10-CM

## 2024-09-06 DIAGNOSIS — M96.1 POSTLAMINECTOMY SYNDROME, NOT ELSEWHERE CLASSIFIED: ICD-10-CM

## 2024-09-06 DIAGNOSIS — Z90.710 ACQUIRED ABSENCE OF BOTH CERVIX AND UTERUS: Chronic | ICD-10-CM

## 2024-09-06 LAB
APTT BLD: 22.7 SEC — LOW (ref 24.5–35.6)
GLUCOSE BLDC GLUCOMTR-MCNC: 109 MG/DL — HIGH (ref 70–99)
GLUCOSE BLDC GLUCOMTR-MCNC: 162 MG/DL — HIGH (ref 70–99)
INR BLD: 0.85 RATIO — SIGNIFICANT CHANGE UP (ref 0.85–1.18)
PROTHROM AB SERPL-ACNC: 9.7 SEC — SIGNIFICANT CHANGE UP (ref 9.5–13)

## 2024-09-06 PROCEDURE — 97163 PT EVAL HIGH COMPLEX 45 MIN: CPT | Mod: GP

## 2024-09-06 PROCEDURE — 85610 PROTHROMBIN TIME: CPT

## 2024-09-06 PROCEDURE — 85025 COMPLETE CBC W/AUTO DIFF WBC: CPT

## 2024-09-06 PROCEDURE — 82962 GLUCOSE BLOOD TEST: CPT

## 2024-09-06 PROCEDURE — 88304 TISSUE EXAM BY PATHOLOGIST: CPT

## 2024-09-06 PROCEDURE — 88304 TISSUE EXAM BY PATHOLOGIST: CPT | Mod: 26

## 2024-09-06 PROCEDURE — 85730 THROMBOPLASTIN TIME PARTIAL: CPT

## 2024-09-06 PROCEDURE — 99223 1ST HOSP IP/OBS HIGH 75: CPT

## 2024-09-06 PROCEDURE — C9399: CPT

## 2024-09-06 PROCEDURE — 76000 FLUOROSCOPY <1 HR PHYS/QHP: CPT

## 2024-09-06 PROCEDURE — 85027 COMPLETE CBC AUTOMATED: CPT

## 2024-09-06 PROCEDURE — 97110 THERAPEUTIC EXERCISES: CPT | Mod: GP

## 2024-09-06 PROCEDURE — C1713: CPT

## 2024-09-06 PROCEDURE — 80048 BASIC METABOLIC PNL TOTAL CA: CPT

## 2024-09-06 PROCEDURE — 97116 GAIT TRAINING THERAPY: CPT | Mod: GP

## 2024-09-06 PROCEDURE — 36415 COLL VENOUS BLD VENIPUNCTURE: CPT

## 2024-09-06 PROCEDURE — C1889: CPT

## 2024-09-06 RX ORDER — DULOXETINE HCL 30 MG
60 CAPSULE,DELAYED RELEASE (ENTERIC COATED) ORAL DAILY
Refills: 0 | Status: DISCONTINUED | OUTPATIENT
Start: 2024-09-06 | End: 2024-09-10

## 2024-09-06 RX ORDER — HYDROCORTISONE 10 MG/1
50 TABLET ORAL EVERY 8 HOURS
Refills: 0 | Status: COMPLETED | OUTPATIENT
Start: 2024-09-07 | End: 2024-09-07

## 2024-09-06 RX ORDER — SENNA 187 MG
2 TABLET ORAL AT BEDTIME
Refills: 0 | Status: DISCONTINUED | OUTPATIENT
Start: 2024-09-06 | End: 2024-09-10

## 2024-09-06 RX ORDER — LISINOPRIL 10 MG/1
1 TABLET ORAL
Refills: 0 | DISCHARGE

## 2024-09-06 RX ORDER — CLONAZEPAM 1 MG
1 TABLET ORAL
Refills: 0 | DISCHARGE

## 2024-09-06 RX ORDER — HYDROXYCHLOROQUINE SULFATE 200 MG/1
200 TABLET, FILM COATED ORAL
Refills: 0 | Status: DISCONTINUED | OUTPATIENT
Start: 2024-09-06 | End: 2024-09-10

## 2024-09-06 RX ORDER — LISINOPRIL 10 MG/1
10 TABLET ORAL
Refills: 0 | Status: DISCONTINUED | OUTPATIENT
Start: 2024-09-06 | End: 2024-09-06

## 2024-09-06 RX ORDER — VANCOMYCIN/0.9 % SOD CHLORIDE 1.75G/25
1250 PLASTIC BAG, INJECTION (ML) INTRAVENOUS ONCE
Refills: 0 | Status: COMPLETED | OUTPATIENT
Start: 2024-09-06 | End: 2024-09-06

## 2024-09-06 RX ORDER — OXYCODONE HYDROCHLORIDE 5 MG/1
1 TABLET ORAL
Refills: 0 | DISCHARGE

## 2024-09-06 RX ORDER — HYDRALAZINE HCL 50 MG
5 TABLET ORAL ONCE
Refills: 0 | Status: COMPLETED | OUTPATIENT
Start: 2024-09-06 | End: 2024-09-06

## 2024-09-06 RX ORDER — AMLODIPINE BESYLATE 10 MG/1
1 TABLET ORAL
Refills: 0 | DISCHARGE

## 2024-09-06 RX ORDER — AMLODIPINE BESYLATE 10 MG/1
10 TABLET ORAL DAILY
Refills: 0 | Status: DISCONTINUED | OUTPATIENT
Start: 2024-09-06 | End: 2024-09-10

## 2024-09-06 RX ORDER — PREDNISONE 10 MG
20 TABLET, DOSE PACK ORAL DAILY
Refills: 0 | Status: DISCONTINUED | OUTPATIENT
Start: 2024-09-07 | End: 2024-09-10

## 2024-09-06 RX ORDER — HYDROMORPHONE HYDROCHLORIDE 2 MG/1
0.5 TABLET ORAL
Refills: 0 | Status: DISCONTINUED | OUTPATIENT
Start: 2024-09-06 | End: 2024-09-08

## 2024-09-06 RX ORDER — DIAZEPAM 10 MG
5 TABLET ORAL EVERY 12 HOURS
Refills: 0 | Status: DISCONTINUED | OUTPATIENT
Start: 2024-09-06 | End: 2024-09-10

## 2024-09-06 RX ORDER — OXYCODONE HYDROCHLORIDE 5 MG/1
10 TABLET ORAL EVERY 4 HOURS
Refills: 0 | Status: COMPLETED | OUTPATIENT
Start: 2024-09-06 | End: 2024-09-13

## 2024-09-06 RX ORDER — CYCLOBENZAPRINE HCL 10 MG
1 TABLET ORAL
Refills: 0 | DISCHARGE

## 2024-09-06 RX ORDER — ONDANSETRON 2 MG/ML
4 INJECTION, SOLUTION INTRAMUSCULAR; INTRAVENOUS EVERY 6 HOURS
Refills: 0 | Status: DISCONTINUED | OUTPATIENT
Start: 2024-09-06 | End: 2024-09-10

## 2024-09-06 RX ORDER — CLONAZEPAM 1 MG
1 TABLET ORAL AT BEDTIME
Refills: 0 | Status: DISCONTINUED | OUTPATIENT
Start: 2024-09-06 | End: 2024-09-10

## 2024-09-06 RX ORDER — LISINOPRIL 10 MG/1
10 TABLET ORAL DAILY
Refills: 0 | Status: DISCONTINUED | OUTPATIENT
Start: 2024-09-06 | End: 2024-09-06

## 2024-09-06 RX ORDER — CYCLOBENZAPRINE HCL 10 MG
10 TABLET ORAL EVERY 8 HOURS
Refills: 0 | Status: DISCONTINUED | OUTPATIENT
Start: 2024-09-06 | End: 2024-09-10

## 2024-09-06 RX ORDER — ACETAMINOPHEN 325 MG/1
650 TABLET ORAL EVERY 6 HOURS
Refills: 0 | Status: DISCONTINUED | OUTPATIENT
Start: 2024-09-06 | End: 2024-09-10

## 2024-09-06 RX ORDER — HYDROMORPHONE HYDROCHLORIDE 2 MG/1
30 TABLET ORAL
Refills: 0 | Status: DISCONTINUED | OUTPATIENT
Start: 2024-09-06 | End: 2024-09-08

## 2024-09-06 RX ORDER — DULOXETINE HCL 30 MG
1 CAPSULE,DELAYED RELEASE (ENTERIC COATED) ORAL
Refills: 0 | DISCHARGE

## 2024-09-06 RX ORDER — NALOXONE HCL 1 MG/ML
0.1 VIAL (ML) INJECTION
Refills: 0 | Status: DISCONTINUED | OUTPATIENT
Start: 2024-09-06 | End: 2024-09-08

## 2024-09-06 RX ORDER — TIZANIDINE HYDROCHLORIDE 2 MG/1
2 CAPSULE ORAL
Refills: 0 | DISCHARGE

## 2024-09-06 RX ORDER — GABAPENTIN 100 MG
600 CAPSULE ORAL THREE TIMES A DAY
Refills: 0 | Status: DISCONTINUED | OUTPATIENT
Start: 2024-09-06 | End: 2024-09-10

## 2024-09-06 RX ORDER — ONDANSETRON 2 MG/ML
4 INJECTION, SOLUTION INTRAMUSCULAR; INTRAVENOUS EVERY 6 HOURS
Refills: 0 | Status: DISCONTINUED | OUTPATIENT
Start: 2024-09-06 | End: 2024-09-07

## 2024-09-06 RX ADMIN — Medication 600 MILLIGRAM(S): at 21:26

## 2024-09-06 RX ADMIN — ONDANSETRON 4 MILLIGRAM(S): 2 INJECTION, SOLUTION INTRAMUSCULAR; INTRAVENOUS at 14:21

## 2024-09-06 RX ADMIN — HYDROMORPHONE HYDROCHLORIDE 30 MILLILITER(S): 2 TABLET ORAL at 14:17

## 2024-09-06 RX ADMIN — Medication 166.67 MILLIGRAM(S): at 20:13

## 2024-09-06 RX ADMIN — Medication 5 MILLIGRAM(S): at 15:05

## 2024-09-06 RX ADMIN — HYDROXYCHLOROQUINE SULFATE 200 MILLIGRAM(S): 200 TABLET, FILM COATED ORAL at 21:27

## 2024-09-06 RX ADMIN — Medication 100 MILLILITER(S): at 20:14

## 2024-09-06 RX ADMIN — Medication 2 TABLET(S): at 21:27

## 2024-09-06 RX ADMIN — Medication 10 MILLIGRAM(S): at 14:30

## 2024-09-06 RX ADMIN — Medication 100 MILLILITER(S): at 14:30

## 2024-09-06 RX ADMIN — Medication 100 MILLILITER(S): at 21:28

## 2024-09-06 RX ADMIN — Medication 10 MILLIGRAM(S): at 21:26

## 2024-09-06 NOTE — BRIEF OPERATIVE NOTE - NSICDXBRIEFPOSTOP_GEN_ALL_CORE_FT
POST-OP DIAGNOSIS:  Lumbar canal stenosis 06-Sep-2024 13:52:15  Vasyl Schaefer  Post laminectomy syndrome 06-Sep-2024 13:52:24  Vasyl Schaefer  
POST-OP DIAGNOSIS:  Lumbar canal stenosis 06-Sep-2024 13:52:15  Vasyl Schaefer  Post laminectomy syndrome 06-Sep-2024 13:52:24  Vasyl Schaefer

## 2024-09-06 NOTE — CONSULT NOTE ADULT - NS ATTEND AMEND GEN_ALL_CORE FT
Chart and meds reviewed.  Case d/w TREY Khoury.    59 F with Hx of anxiety/depression, asthma, HTN, OA of B/L knees, morbid obesity BMI 37.9, peripheral edema, chronic RA on Lefluonamide, Hydroxyurea and Prednisone, chronic low back pain with radiculopathy to bilateral LEs, with h/o lumbar fusion 2 years ago, now admitted for elective bilateral laminectomy, revision or prior laminectomy, exploration fusion, removal of instrumentation, posterior fusion and instrumentation of L2-L5.      Plan:  -  pain control with Dilaudid PCA  -  incentive spirometry  -  neurochecks  -  monitor output from BETITO drains  -  strict Is/Os  -  stress dose steroids - continue Prednisone 20mg daily and give Hydrocortisone 50mg IV q8 hours for 24 hours  -  check labs in am  -  wound care per ortho spine  -  continue home meds  -  Daniels for now, voiding trial in am  -  DVT prophylaxis with venodynes for now  -  agree with the plan of care as outlined by TREY Khoury.

## 2024-09-06 NOTE — CONSULT NOTE ADULT - SUBJECTIVE AND OBJECTIVE BOX
Intraop consult for back closure.  Pt s/p L2-L5 laminectomy and fusion with recurrent symptoms.  History of MRSA infection at first surgery.  Need for reoperation.  Asked to see pt intraop given morbid obesity and re-operation for muscle flap closure and complex closure back.    PE-  Intubated prone position.  MOrbidly obese female.    Open wound thoracic and lumbar spine levels T10-L5 after completion instrumentation    A/P-Plan for muscle flap, complex closure spine.

## 2024-09-06 NOTE — CONSULT NOTE ADULT - TIME-BASED
Brian Dill with [facility] called about a prescription follow up for this patient. The fax number is listed below. Please advise.       Fax number: [phone]

## 2024-09-06 NOTE — BRIEF OPERATIVE NOTE - NSICDXBRIEFPROCEDURE_GEN_ALL_CORE_FT
PROCEDURES:  Posterior fusion of lumbar spine with laminectomy 06-Sep-2024 13:51:30  Vasyl Schaefer  
PROCEDURES:  Posterior fusion of lumbar spine with laminectomy 06-Sep-2024 13:51:30  Vasyl Schaefer

## 2024-09-06 NOTE — CONSULT NOTE ADULT - CONSULT REASON
medical management
revision back with instrumentation, previous wound healing complications, morbid obesity

## 2024-09-06 NOTE — BRIEF OPERATIVE NOTE - NSICDXBRIEFPREOP_GEN_ALL_CORE_FT
PRE-OP DIAGNOSIS:  Lumbar canal stenosis 06-Sep-2024 13:51:50  Vasyl Schaefer  Post laminectomy syndrome 06-Sep-2024 13:52:00  Vasyl Schaefer  
PRE-OP DIAGNOSIS:  Lumbar canal stenosis 06-Sep-2024 13:51:50  Vasyl Schaefer

## 2024-09-06 NOTE — CONSULT NOTE ADULT - SUBJECTIVE AND OBJECTIVE BOX
PCP: Dr. Raul no    CHIEF COMPLAINT: chronic back pain    HISTORY OF THE PRESENT ILLNESS: this is a 60 yo female with pmh: anxiety/depression, asthma, HTN, OA of B/L knees morbid obesity, BMI 37.9, peripheral edema, RA, chronic low back pain with radiculopathy to BLWE, with h/o lumbar fusion 2 years ago. However, she states that she still has constant pain in her lower back that ends in the coccyx. She is now seen in PACU S/P B/L laminectomy, revision or prior laminectomy, exploration fusion, removal of instrumentation, posterior fusion and instrumentation of L2-5.  Plastic surgery was also involved for posterior wound closure.  Pt is crying, c/o postop pain,  Dilaudid PCA being set up for pt use.  B/p earlier was high 194./109, but better now normotensive 2/2 to better pain control.  We are consulted for medical management    PAST MEDICAL HISTORY: as above    PAST SURGICAL HISTORY: left TKR, malathi, hysterectomy, lumbar spinal fusion, uretheral sling    FAMILY HISTORY:  father: bone cancer, osteoarthritis, DM, mother: dementia    SOCIAL HISTORY:  never a smoker, rare alcohol, denies rec drugs    ALLERGIES: levaquin: hives    HOME MEDS: see med rec    Vital Signs Last 24 Hrs  T(C): 36.4 (06 Sep 2024 06:50), Max: 36.4 (06 Sep 2024 06:50)  T(F): 97.5 (06 Sep 2024 06:50), Max: 97.5 (06 Sep 2024 06:50)  HR: 74 (06 Sep 2024 06:50) (74 - 74)  BP: 138/88 (06 Sep 2024 06:50) (138/88 - 138/88)  BP(mean): --  RR: 16 (06 Sep 2024 06:50) (16 - 16)  SpO2: 99% (06 Sep 2024 06:50) (99% - 99%)        REVIEW OF SYSTEMS:   All 10 systems reviewed in detailed and found to be negative with the exception of what has already been described above    MEDICATIONS  (STANDING):  amLODIPine   Tablet 10 milliGRAM(s) Oral daily  clonazePAM  Tablet 1 milliGRAM(s) Oral at bedtime  DULoxetine 60 milliGRAM(s) Oral daily  gabapentin 600 milliGRAM(s) Oral three times a day  HYDROmorphone PCA (1 mG/mL) 30 milliLiter(s) PCA Continuous PCA Continuous  hydroxychloroquine 200 milliGRAM(s) Oral two times a day  lactated ringers. 1000 milliLiter(s) (100 mL/Hr) IV Continuous <Continuous>  lactated ringers. 1000 milliLiter(s) (100 mL/Hr) IV Continuous <Continuous>  lisinopril 10 milliGRAM(s) Oral two times a day  prochlorperazine   Injectable 10 milliGRAM(s) IV Push once  tranexamic acid IVPB 2000 milliGRAM(s) IV Intermittent once  tranexamic acid IVPB 2000 milliGRAM(s) IV Intermittent once    MEDICATIONS  (PRN):  HYDROmorphone PCA (1 mG/mL) Rescue Clinician Bolus 0.5 milliGRAM(s) IV Push every 15 minutes PRN for Pain Scale GREATER THAN 6  naloxone Injectable 0.1 milliGRAM(s) IV Push every 3 minutes PRN For ANY of the following changes in patient status:  A. RR LESS THAN 10 breaths per minute, B. Oxygen saturation LESS THAN 90%, C. Sedation score of 6  ondansetron Injectable 4 milliGRAM(s) IV Push every 6 hours PRN Nausea    PHYSICAL EXAM:    GENERAL: Comfortable, no acute distress   HEAD:  Normocephalic, atraumatic  EYES: EOMI, PERRLA  HEENT: Moist mucous membranes  NECK: Supple, No JVD  NERVOUS SYSTEM:  Alert & Oriented X3, Motor Strength 5/5 B/L upper and lower extremities  CHEST/LUNG: Clear to auscultation bilaterally  HEART: Regular rate and rhythm  ABDOMEN: Soft, non tender, Nondistended, Bowel sounds present  GENITOURINARY: medina  EXTREMITIES:   No clubbing, cyanosis, or edema  MUSCULOSKELETAL- back dsg c/d/i + BETITO  SKIN-no rash      LABS: preop labs reviewed    PT/INR - ( 06 Sep 2024 06:43 )   PT: 9.7 sec;   INR: 0.85 ratio         PTT - ( 06 Sep 2024 06:43 )  PTT:22.7 sec      CAPRINI SCORE Version 2013    AGE RELATED RISK FACTORS                                                             [ ] Age 41-60 years             [1 point]  [ x] Age: 61-74 years            [2 points]                 [ ] Age 75 years or over     [3 points]             DISEASE RELATED RISK FACTORS                                                       [x ] Current swollen legs (pitting edema of any level)     [1 point]                     [ ] Varicose veins (visible, bulging vein, not spider veins or surgically removed veins)   [1 point]                                 [x ] BMI > 25 Kg/m2                       [1 point]  [ ] BMI > 40 kg/m2                       [1 point]                                 [ ] Serious infection (within past month, requiring hospitalization and IV antibiotics)    [1 point]                     [ ] Lung disease ( interstitial: COPD, emphysema, sarcoid, 9-11 illness, NOT asthma)       [1 point]                                                                          [ ] Acute myocardial infarction (within past month)      [1 point]                  [ ] Congestive heart failure (episode within past month or taking CHF meds)                   [1 point]         [ ] Inflammatory bowel disease (Crohns disease or ulcerative colitis, not irritable bowel syndrome)   [1 point]                  [ ] Central venous access, PICC line or Port (within past month)     [2 points]                                                             [ ] Stroke (within past month)     [5 points]    [ ] Previous or present malignancy (includes melanoma but not basal cell carcinoma, each incidence of cancer scores 2 points-not metastases)  [2 points]                                                                                                                                                         HEMATOLOGY RELATED FACTORS                                                         [ ] History of DVT or PE (including superficial venous thrombosis)    [3 points]                    [ ] Positive family history for DVT or PE (includes first-, second-, and third-degree relatives)   [3 points]   [ ] Personal or family history of genetic thrombophilia (family history counts only if it has not been confirmed that patient does not have this genetic marker)                       [ ] Prothrombin [id number] mutation                   [3 points]                           [ ] Factor V Leiden                                               [3 points]            [ ] Antithrombin III deficiency                           [3 points]         [ ] Protein C & S deficiency                                [3 points]              [ ] Dysfibrinogenemia                                         [3 points]  [ ] Personal history of acquired thrombophilia                       [ ] Lupus anticoagulant                                       [3 points]                                                                  [ ] Anticardiolipin antibodies                             [3 points]              [ ] Antiphospholipid antibodies                         [3 points]                                                         [ ] High homocysteine in the blood                  [3 points]                                                    [ ] Myeloproliferative disorders (including thrombocytosis)    [3 points]            [ ] HIV                                                                     [3 points]                                                    [ ] Heparin induced thrombocytopenia            [3 points]                                        MOBILITY RELATED FACTORS  [x ] Bed rest or restricted mobility (inability to ambulate 30 feet continuously or removable leg brace) for less than 72 hours    [1 point]  [ ] Nonremovable plaster cast or mold that prevents calf muscle use   [2 points]  [ ] Bed bound  or restricted mobility for more than 72 hours                  [2 points]    GENDER SPECIFIC FACTORS  [ ] Pregnancy or had a baby within the last month   [1 point]  [ ] Hormone therapy  or oral contraception               [1 point]  [ ] Current use of estrogen-like drugs (raloxifine, tamoxifen, anastrozole, letrozole)                                [1 point]  [ ] History of unexplained stillborn infant, premature birth with toxemia or growth-restricted infant   [1 point]  [ ] Recurrent spontaneous abortions (3 or more)      [1 point]    OTHER RISK FACTORS                                         [ ] Current smoker (includes vaping and smoking marijuana)      [1 point]  [ ] Diabetes requiring insulin     [1 point]                   [ x] Chemotherapy (includes methotrexate for rheumatoid arthritis, hydroxyurea for thrombocytosis)    [1 point]  [ ] Blood transfusion(s)               [1 point]    SURGERY RELATED RISK FACTORS  [ ]  section within the last month                    [1 point]  [ ] Minor surgery is planned (less than 45 minutes)     [1 point]  [ ] Past major surgery (longer than 45 minutes) within past month  [2 points]  [ ] Planned major surgery lasting more than 45 minutes (includes laparoscopic and arthroscopic; do not add to the "5" for hip and knee replacement)    [2 points]  [x ] Length of surgery over 2 hours (includes anesthesia time; do not add to the "5" for hip and knee replacement)   [1 point]  [ ] Elective hip or knee joint replacement surgery         [5 points]                                               TRAUMA RELATED RISK FACTORS  [ ] Fracture of the hip, pelvis, or leg                       [5 points]  [ ] Spinal cord injury resulting in paralysis (within the past month)    [5 points]  [ ] Paralysis  (within the past month)                      [5 points]  [ ] Multiple trauma (within the past month)         [5 Points]    Total Score [   6     ]    Total hip and total knee replacement:  Caprini score 9 or less: LOW risk  Caprini score 10 or highter: HIGH RISK    Caprini score 0-2: Low Risk, NO VTE prophylaxis required for most patients, encourage ambulation  Caprini score 3-6: Moderate Risk , pharmacologic VTE prophylaxis is indicated for most patients (in the absence of contraindications)  Caprini score 7 or higher: High risk, pharmocologic VTE prophylaxis indicated for most patients (in the absence of contraindications)      IMPRESSION: this is a 60 yo female with pmh: anxiety/depression, asthma, HTN, OA of B/L knees morbid obesity, BMI 37.9, peripheral edema, RA, chronic low back pain with radiculopathy to BLE, with h/o lumbar fusion 2 years ago. However, she states that she still has constant pain in her lower back that ends in the coccyx. She is now seen in PACU S/P B/L laminectomy, revision or prior laminectomy, exploration fusion, removal of instrumentation, posterior fusion and instrumentation of L2-5.  Plastic surgery was also involved for posterior wound closure.      Plan:   pain control with Dilaudid PCA  PT eval  wound care per spine surgery  cont gabapentin and flexeril  bowel regimen  IVFs until taking po  [facility]      #RA   on hydroxychloroquine  also on chronic steroids: prednisone 20 mg po daily  not clear in chart review if or when pt stopped prednisone  pt did receive Solu-cortef 100 mg IVintraop  resume prednisone 20 mg po daily   stress dose Hydrocortisone 50 mg IV q 8 h x 3 doses    #Hypertension  cont amlodipine, hold lisinopril to prevent postop hypotension  pt is Hypertensive presently in PACU most likely from pain      # anxiety/depression   cont clonazepam, duloxetine    # morbid obesity/Pre-dm  A1C 6.2  dietary/lifestyle modifications    #CARINA, no h/o  STOP BANG score #4: intermediate risk  monitor o2 sats, supplement with O2 NC to maintain sats > 92%    #VTE prophylaxis  hold chemical prophylaxis until ok with surgery  venodynes BLE  mobilize as much as possible    Thank you for the consult, will follow with you              PCP: Dr. Raul no    CHIEF COMPLAINT: chronic back pain    HISTORY OF THE PRESENT ILLNESS: this is a 58 yo female with pmh: anxiety/depression, asthma, HTN, OA of B/L knees morbid obesity, BMI 37.9, peripheral edema, RA, chronic low back pain with radiculopathy to BLWE, with h/o lumbar fusion 2 years ago. However, she states that she still has constant pain in her lower back that ends in the coccyx. She is now seen in PACU S/P B/L laminectomy, revision or prior laminectomy, exploration fusion, removal of instrumentation, posterior fusion and instrumentation of L2-5.  Plastic surgery was also involved for posterior wound closure.  Pt is crying, c/o postop pain,  Dilaudid PCA being set up for pt use.  B/p earlier was high 194./109, but better now normotensive 2/2 to better pain control.  We are consulted for medical management    PAST MEDICAL HISTORY: as above    PAST SURGICAL HISTORY: left TKR, malathi, hysterectomy, lumbar spinal fusion, uretheral sling    FAMILY HISTORY:  father: bone cancer, osteoarthritis, DM, mother: dementia    SOCIAL HISTORY:  never a smoker, rare alcohol, denies rec drugs    ALLERGIES: levaquin: hives    HOME MEDS: see med rec    Vital Signs Last 24 Hrs  T(C): 36.4 (06 Sep 2024 06:50), Max: 36.4 (06 Sep 2024 06:50)  T(F): 97.5 (06 Sep 2024 06:50), Max: 97.5 (06 Sep 2024 06:50)  HR: 74 (06 Sep 2024 06:50) (74 - 74)  BP: 138/88 (06 Sep 2024 06:50) (138/88 - 138/88)  BP(mean): --  RR: 16 (06 Sep 2024 06:50) (16 - 16)  SpO2: 99% (06 Sep 2024 06:50) (99% - 99%)        REVIEW OF SYSTEMS:   All 10 systems reviewed in detailed and found to be negative with the exception of what has already been described above    MEDICATIONS  (STANDING):  amLODIPine   Tablet 10 milliGRAM(s) Oral daily  clonazePAM  Tablet 1 milliGRAM(s) Oral at bedtime  DULoxetine 60 milliGRAM(s) Oral daily  gabapentin 600 milliGRAM(s) Oral three times a day  HYDROmorphone PCA (1 mG/mL) 30 milliLiter(s) PCA Continuous PCA Continuous  hydroxychloroquine 200 milliGRAM(s) Oral two times a day  lactated ringers. 1000 milliLiter(s) (100 mL/Hr) IV Continuous <Continuous>  lactated ringers. 1000 milliLiter(s) (100 mL/Hr) IV Continuous <Continuous>  lisinopril 10 milliGRAM(s) Oral two times a day  prochlorperazine   Injectable 10 milliGRAM(s) IV Push once  tranexamic acid IVPB 2000 milliGRAM(s) IV Intermittent once  tranexamic acid IVPB 2000 milliGRAM(s) IV Intermittent once    MEDICATIONS  (PRN):  HYDROmorphone PCA (1 mG/mL) Rescue Clinician Bolus 0.5 milliGRAM(s) IV Push every 15 minutes PRN for Pain Scale GREATER THAN 6  naloxone Injectable 0.1 milliGRAM(s) IV Push every 3 minutes PRN For ANY of the following changes in patient status:  A. RR LESS THAN 10 breaths per minute, B. Oxygen saturation LESS THAN 90%, C. Sedation score of 6  ondansetron Injectable 4 milliGRAM(s) IV Push every 6 hours PRN Nausea    PHYSICAL EXAM:    GENERAL: Comfortable, no acute distress   HEAD:  Normocephalic, atraumatic  EYES: EOMI, PERRLA  HEENT: Moist mucous membranes  NECK: Supple, No JVD  NERVOUS SYSTEM:  Alert & Oriented X3, Motor Strength 5/5 B/L upper and lower extremities  CHEST/LUNG: Clear to auscultation bilaterally  HEART: Regular rate and rhythm  ABDOMEN: Soft, non tender, Nondistended, Bowel sounds present  GENITOURINARY: [facility]  EXTREMITIES:   No clubbing, cyanosis, or edema  MUSCULOSKELETAL- back dsg c/d/i + BETITO  SKIN-no rash      LABS: preop labs reviewed    PT/INR - ( 06 Sep 2024 06:43 )   PT: 9.7 sec;   INR: 0.85 ratio         PTT - ( 06 Sep 2024 06:43 )  PTT:22.7 sec      CAPRINI SCORE Version 2013    AGE RELATED RISK FACTORS                                                             [ ] Age 41-60 years             [1 point]  [ x] Age: 61-74 years            [2 points]                 [ ] Age 75 years or over     [3 points]             DISEASE RELATED RISK FACTORS                                                       [x ] Current swollen legs (pitting edema of any level)     [1 point]                     [ ] Varicose veins (visible, bulging vein, not spider veins or surgically removed veins)   [1 point]                                 [x ] BMI > 25 Kg/m2                       [1 point]  [ ] BMI > 40 kg/m2                       [1 point]                                 [ ] Serious infection (within past month, requiring hospitalization and IV antibiotics)    [1 point]                     [ ] Lung disease ( interstitial: COPD, emphysema, sarcoid, 9-11 illness, NOT asthma)       [1 point]                                                                          [ ] Acute myocardial infarction (within past month)      [1 point]                  [ ] Congestive heart failure (episode within past month or taking CHF meds)                   [1 point]         [ ] Inflammatory bowel disease (Crohns disease or ulcerative colitis, not irritable bowel syndrome)   [1 point]                  [ ] Central venous access, PICC line or Port (within past month)     [2 points]                                                             [ ] Stroke (within past month)     [5 points]    [ ] Previous or present malignancy (includes melanoma but not basal cell carcinoma, each incidence of cancer scores 2 points-not metastases)  [2 points]                                                                                                                                                         HEMATOLOGY RELATED FACTORS                                                         [ ] History of DVT or PE (including superficial venous thrombosis)    [3 points]                    [ ] Positive family history for DVT or PE (includes first-, second-, and third-degree relatives)   [3 points]   [ ] Personal or family history of genetic thrombophilia (family history counts only if it has not been confirmed that patient does not have this genetic marker)                       [ ] Prothrombin [id number] mutation                   [3 points]                           [ ] Factor V Leiden                                               [3 points]            [ ] Antithrombin III deficiency                           [3 points]         [ ] Protein C & S deficiency                                [3 points]              [ ] Dysfibrinogenemia                                         [3 points]  [ ] Personal history of acquired thrombophilia                       [ ] Lupus anticoagulant                                       [3 points]                                                                  [ ] Anticardiolipin antibodies                             [3 points]              [ ] Antiphospholipid antibodies                         [3 points]                                                         [ ] High homocysteine in the blood                  [3 points]                                                    [ ] Myeloproliferative disorders (including thrombocytosis)    [3 points]            [ ] HIV                                                                     [3 points]                                                    [ ] Heparin induced thrombocytopenia            [3 points]                                        MOBILITY RELATED FACTORS  [x ] Bed rest or restricted mobility (inability to ambulate 30 feet continuously or removable leg brace) for less than 72 hours    [1 point]  [ ] Nonremovable plaster cast or mold that prevents calf muscle use   [2 points]  [ ] Bed bound  or restricted mobility for more than 72 hours                  [2 points]    GENDER SPECIFIC FACTORS  [ ] Pregnancy or had a baby within the last month   [1 point]  [ ] Hormone therapy  or oral contraception               [1 point]  [ ] Current use of estrogen-like drugs (raloxifine, tamoxifen, anastrozole, letrozole)                                [1 point]  [ ] History of unexplained stillborn infant, premature birth with toxemia or growth-restricted infant   [1 point]  [ ] Recurrent spontaneous abortions (3 or more)      [1 point]    OTHER RISK FACTORS                                         [ ] Current smoker (includes vaping and smoking marijuana)      [1 point]  [ ] Diabetes requiring insulin     [1 point]                   [ x] Chemotherapy (includes methotrexate for rheumatoid arthritis, hydroxyurea for thrombocytosis)    [1 point]  [ ] Blood transfusion(s)               [1 point]    SURGERY RELATED RISK FACTORS  [ ]  section within the last month                    [1 point]  [ ] Minor surgery is planned (less than 45 minutes)     [1 point]  [ ] Past major surgery (longer than 45 minutes) within past month  [2 points]  [ ] Planned major surgery lasting more than 45 minutes (includes laparoscopic and arthroscopic; do not add to the "5" for hip and knee replacement)    [2 points]  [x ] Length of surgery over 2 hours (includes anesthesia time; do not add to the "5" for hip and knee replacement)   [1 point]  [ ] Elective hip or knee joint replacement surgery         [5 points]                                               TRAUMA RELATED RISK FACTORS  [ ] Fracture of the hip, pelvis, or leg                       [5 points]  [ ] Spinal cord injury resulting in paralysis (within the past month)    [5 points]  [ ] Paralysis  (within the past month)                      [5 points]  [ ] Multiple trauma (within the past month)         [5 Points]    Total Score [   6     ]    Total hip and total knee replacement:  Caprini score 9 or less: LOW risk  Caprini score 10 or highter: HIGH RISK    Caprini score 0-2: Low Risk, NO VTE prophylaxis required for most patients, encourage ambulation  Caprini score 3-6: Moderate Risk , pharmacologic VTE prophylaxis is indicated for most patients (in the absence of contraindications)  Caprini score 7 or higher: High risk, pharmocologic VTE prophylaxis indicated for most patients (in the absence of contraindications)      IMPRESSION: this is a 58 yo female with pmh: anxiety/depression, asthma, HTN, OA of B/L knees morbid obesity, BMI 37.9, peripheral edema, RA, chronic low back pain with radiculopathy to BLE, with h/o lumbar fusion 2 years ago. However, she states that she still has constant pain in her lower back that ends in the coccyx. She is now seen in PACU S/P B/L laminectomy, revision or prior laminectomy, exploration fusion, removal of instrumentation, posterior fusion and instrumentation of L2-5.  Plastic surgery was also involved for posterior wound closure.      Plan:   pain control with Dilaudid PCA  PT eval  wound care per spine surgery  cont gabapentin and flexeril  bowel regimen  IVFs until taking po  [facility]      #RA   on hydroxychloroquine  also on chronic steroids: prednisone 20 mg po daily  not clear in chart review if or when pt stopped prednisone  pt did receive Solu-cortef 100 mg IVintraop  resume prednisone 20 mg po daily   stress dose Hydrocortisone 50 mg IV q 8 h x 3 doses    #Hypertension  cont amlodipine, hold lisinopril to prevent postop hypotension  pt is Hypertensive presently in PACU most likely from pain  monitor B/P may need to resume Lisinopril      # anxiety/depression   cont clonazepam, duloxetine    # morbid obesity/Pre-dm  A1C 6.2  dietary/lifestyle modifications    #CARINA, no h/o  STOP BANG score #4: intermediate risk  monitor o2 sats, supplement with O2 NC to maintain sats > 92%    #VTE prophylaxis  hold chemical prophylaxis until ok with surgery  venodynes BLE  mobilize as much as possible    Thank you for the consult, will follow with you              PCP: Dr. Raul no    CHIEF COMPLAINT: chronic back pain    HISTORY OF THE PRESENT ILLNESS: this is a 60 yo female with pmh: anxiety/depression, asthma, HTN, OA of B/L knees morbid obesity, BMI 37.9, peripheral edema, RA, chronic low back pain with radiculopathy to bilateral LEs, with h/o lumbar fusion 2 years ago. However, she states that she still has constant pain in her lower back that ends in the coccyx. She is now seen in PACU S/P B/L laminectomy, revision or prior laminectomy, exploration fusion, removal of instrumentation, posterior fusion and instrumentation of L2-5.  Plastic surgery was also involved for posterior wound closure.  Pt is crying, c/o postop pain,  Dilaudid PCA being set up for pt use.  B/p earlier was high 194./109, but better now normotensive 2/2 to better pain control.  We are consulted for medical management    PAST MEDICAL HISTORY: as above    PAST SURGICAL HISTORY: left TKR, malathi, hysterectomy, lumbar spinal fusion, uretheral sling    FAMILY HISTORY:  father: bone cancer, osteoarthritis, DM, mother: dementia    SOCIAL HISTORY:  never a smoker, rare alcohol, denies rec drugs    ALLERGIES: levaquin: hives    HOME MEDS: see med rec    Vital Signs Last 24 Hrs  T(C): 36.4 (06 Sep 2024 06:50), Max: 36.4 (06 Sep 2024 06:50)  T(F): 97.5 (06 Sep 2024 06:50), Max: 97.5 (06 Sep 2024 06:50)  HR: 74 (06 Sep 2024 06:50) (74 - 74)  BP: 138/88 (06 Sep 2024 06:50) (138/88 - 138/88)  BP(mean): --  RR: 16 (06 Sep 2024 06:50) (16 - 16)  SpO2: 99% (06 Sep 2024 06:50) (99% - 99%)    REVIEW OF SYSTEMS:   All 10 systems reviewed in detailed and found to be negative with the exception of what has already been described above    MEDICATIONS  (STANDING):  amLODIPine   Tablet 10 milliGRAM(s) Oral daily  clonazePAM  Tablet 1 milliGRAM(s) Oral at bedtime  DULoxetine 60 milliGRAM(s) Oral daily  gabapentin 600 milliGRAM(s) Oral three times a day  HYDROmorphone PCA (1 mG/mL) 30 milliLiter(s) PCA Continuous PCA Continuous  hydroxychloroquine 200 milliGRAM(s) Oral two times a day  lactated ringers. 1000 milliLiter(s) (100 mL/Hr) IV Continuous <Continuous>  lactated ringers. 1000 milliLiter(s) (100 mL/Hr) IV Continuous <Continuous>  lisinopril 10 milliGRAM(s) Oral two times a day  prochlorperazine   Injectable 10 milliGRAM(s) IV Push once  tranexamic acid IVPB 2000 milliGRAM(s) IV Intermittent once  tranexamic acid IVPB 2000 milliGRAM(s) IV Intermittent once    MEDICATIONS  (PRN):  HYDROmorphone PCA (1 mG/mL) Rescue Clinician Bolus 0.5 milliGRAM(s) IV Push every 15 minutes PRN for Pain Scale GREATER THAN 6  naloxone Injectable 0.1 milliGRAM(s) IV Push every 3 minutes PRN For ANY of the following changes in patient status:  A. RR LESS THAN 10 breaths per minute, B. Oxygen saturation LESS THAN 90%, C. Sedation score of 6  ondansetron Injectable 4 milliGRAM(s) IV Push every 6 hours PRN Nausea    PHYSICAL EXAM:    GENERAL: Comfortable, no acute distress   HEAD:  Normocephalic, atraumatic  EYES: EOMI, PERRLA  HEENT: Moist mucous membranes  NECK: Supple, No JVD  NERVOUS SYSTEM:  Alert & Oriented X3, Motor Strength 5/5 B/L upper and lower extremities  CHEST/LUNG: Clear to auscultation bilaterally  HEART: Regular rate and rhythm  ABDOMEN: Soft, non tender, Nondistended, Bowel sounds present  GENITOURINARY: medina  EXTREMITIES:   No clubbing, cyanosis, or edema  MUSCULOSKELETAL- back dsg c/d/i + BETITO  SKIN-no rash    LABS: preop labs reviewed    PT/INR - ( 06 Sep 2024 06:43 )   PT: 9.7 sec;   INR: 0.85 ratio      PTT - ( 06 Sep 2024 06:43 )  PTT:22.7 sec      CAPRINI SCORE Version 2013    AGE RELATED RISK FACTORS                                                             [ ] Age 41-60 years             [1 point]  [ x] Age: 61-74 years            [2 points]                 [ ] Age 75 years or over     [3 points]             DISEASE RELATED RISK FACTORS                                                       [x ] Current swollen legs (pitting edema of any level)     [1 point]                     [ ] Varicose veins (visible, bulging vein, not spider veins or surgically removed veins)   [1 point]                                 [x ] BMI > 25 Kg/m2                       [1 point]  [ ] BMI > 40 kg/m2                       [1 point]                                 [ ] Serious infection (within past month, requiring hospitalization and IV antibiotics)    [1 point]                     [ ] Lung disease ( interstitial: COPD, emphysema, sarcoid, 9-11 illness, NOT asthma)       [1 point]                                                                          [ ] Acute myocardial infarction (within past month)      [1 point]                  [ ] Congestive heart failure (episode within past month or taking CHF meds)                   [1 point]         [ ] Inflammatory bowel disease (Crohns disease or ulcerative colitis, not irritable bowel syndrome)   [1 point]                  [ ] Central venous access, PICC line or Port (within past month)     [2 points]                                                             [ ] Stroke (within past month)     [5 points]    [ ] Previous or present malignancy (includes melanoma but not basal cell carcinoma, each incidence of cancer scores 2 points-not metastases)  [2 points]                                                                                                                                                         HEMATOLOGY RELATED FACTORS                                                         [ ] History of DVT or PE (including superficial venous thrombosis)    [3 points]                    [ ] Positive family history for DVT or PE (includes first-, second-, and third-degree relatives)   [3 points]   [ ] Personal or family history of genetic thrombophilia (family history counts only if it has not been confirmed that patient does not have this genetic marker)                       [ ] Prothrombin [id number] mutation                   [3 points]                           [ ] Factor V Leiden                                               [3 points]            [ ] Antithrombin III deficiency                           [3 points]         [ ] Protein C & S deficiency                                [3 points]              [ ] Dysfibrinogenemia                                         [3 points]  [ ] Personal history of acquired thrombophilia                       [ ] Lupus anticoagulant                                       [3 points]                                                                  [ ] Anticardiolipin antibodies                             [3 points]              [ ] Antiphospholipid antibodies                         [3 points]                                                         [ ] High homocysteine in the blood                  [3 points]                                                    [ ] Myeloproliferative disorders (including thrombocytosis)    [3 points]            [ ] HIV                                                                     [3 points]                                                    [ ] Heparin induced thrombocytopenia            [3 points]                                        MOBILITY RELATED FACTORS  [x ] Bed rest or restricted mobility (inability to ambulate 30 feet continuously or removable leg brace) for less than 72 hours    [1 point]  [ ] Nonremovable plaster cast or mold that prevents calf muscle use   [2 points]  [ ] Bed bound  or restricted mobility for more than 72 hours                  [2 points]    GENDER SPECIFIC FACTORS  [ ] Pregnancy or had a baby within the last month   [1 point]  [ ] Hormone therapy  or oral contraception               [1 point]  [ ] Current use of estrogen-like drugs (raloxifine, tamoxifen, anastrozole, letrozole)                                [1 point]  [ ] History of unexplained stillborn infant, premature birth with toxemia or growth-restricted infant   [1 point]  [ ] Recurrent spontaneous abortions (3 or more)      [1 point]    OTHER RISK FACTORS                                         [ ] Current smoker (includes vaping and smoking marijuana)      [1 point]  [ ] Diabetes requiring insulin     [1 point]                   [ x] Chemotherapy (includes methotrexate for rheumatoid arthritis, hydroxyurea for thrombocytosis)    [1 point]  [ ] Blood transfusion(s)               [1 point]    SURGERY RELATED RISK FACTORS  [ ]  section within the last month                    [1 point]  [ ] Minor surgery is planned (less than 45 minutes)     [1 point]  [ ] Past major surgery (longer than 45 minutes) within past month  [2 points]  [ ] Planned major surgery lasting more than 45 minutes (includes laparoscopic and arthroscopic; do not add to the "5" for hip and knee replacement)    [2 points]  [x ] Length of surgery over 2 hours (includes anesthesia time; do not add to the "5" for hip and knee replacement)   [1 point]  [ ] Elective hip or knee joint replacement surgery         [5 points]                                               TRAUMA RELATED RISK FACTORS  [ ] Fracture of the hip, pelvis, or leg                       [5 points]  [ ] Spinal cord injury resulting in paralysis (within the past month)    [5 points]  [ ] Paralysis  (within the past month)                      [5 points]  [ ] Multiple trauma (within the past month)         [5 Points]    Total Score [   6     ]    Total hip and total knee replacement:  Caprini score 9 or less: LOW risk  Caprini score 10 or highter: HIGH RISK    Caprini score 0-2: Low Risk, NO VTE prophylaxis required for most patients, encourage ambulation  Caprini score 3-6: Moderate Risk , pharmacologic VTE prophylaxis is indicated for most patients (in the absence of contraindications)  Caprini score 7 or higher: High risk, pharmocologic VTE prophylaxis indicated for most patients (in the absence of contraindications)      IMPRESSION: this is a 60 yo female with pmh: anxiety/depression, asthma, HTN, OA of B/L knees morbid obesity, BMI 37.9, peripheral edema, RA, chronic low back pain with radiculopathy to BLE, with h/o lumbar fusion 2 years ago. However, she states that she still has constant pain in her lower back that ends in the coccyx. She is now seen in PACU S/P B/L laminectomy, revision or prior laminectomy, exploration fusion, removal of instrumentation, posterior fusion and instrumentation of L2-5.  Plastic surgery was also involved for posterior wound closure.      Plan:   pain control with Dilaudid PCA  PT eval  wound care per spine surgery  cont gabapentin and flexeril  bowel regimen  IVFs until taking po  medina      #RA   on hydroxychloroquine  also on chronic steroids: prednisone 20 mg po daily  not clear in chart review if or when pt stopped prednisone  pt did receive Solu-cortef 100 mg IV intraop  resume prednisone 20 mg po daily   stress dose Hydrocortisone 50 mg IV q 8 h x 3 doses    #Hypertension  cont amlodipine, hold lisinopril to prevent postop hypotension  pt is Hypertensive presently in PACU most likely from pain  monitor B/P may need to resume Lisinopril    # anxiety/depression   cont clonazepam, duloxetine    # morbid obesity/Pre-dm  A1C 6.2  dietary/lifestyle modifications    #CARINA, no h/o  STOP BANG score #4: intermediate risk  monitor o2 sats, supplement with O2 NC to maintain sats > 92%    #VTE prophylaxis  hold chemical prophylaxis until ok with surgery  venodynes BLE  mobilize as much as possible    Thank you for the consult, will follow with you

## 2024-09-06 NOTE — PATIENT PROFILE ADULT - FALL HARM RISK - HARM RISK INTERVENTIONS

## 2024-09-07 LAB
ANION GAP SERPL CALC-SCNC: 4 MMOL/L — LOW (ref 5–17)
BASOPHILS # BLD AUTO: 0.05 K/UL — SIGNIFICANT CHANGE UP (ref 0–0.2)
BASOPHILS NFR BLD AUTO: 0.4 % — SIGNIFICANT CHANGE UP (ref 0–2)
BUN SERPL-MCNC: 14 MG/DL — SIGNIFICANT CHANGE UP (ref 7–23)
CALCIUM SERPL-MCNC: 8.6 MG/DL — SIGNIFICANT CHANGE UP (ref 8.5–10.1)
CHLORIDE SERPL-SCNC: 109 MMOL/L — HIGH (ref 96–108)
CO2 SERPL-SCNC: 26 MMOL/L — SIGNIFICANT CHANGE UP (ref 22–31)
CREAT SERPL-MCNC: 0.66 MG/DL — SIGNIFICANT CHANGE UP (ref 0.5–1.3)
EGFR: 100 ML/MIN/1.73M2 — SIGNIFICANT CHANGE UP
EOSINOPHIL # BLD AUTO: 0.07 K/UL — SIGNIFICANT CHANGE UP (ref 0–0.5)
EOSINOPHIL NFR BLD AUTO: 0.6 % — SIGNIFICANT CHANGE UP (ref 0–6)
GLUCOSE SERPL-MCNC: 153 MG/DL — HIGH (ref 70–99)
HCT VFR BLD CALC: 33.3 % — LOW (ref 34.5–45)
HGB BLD-MCNC: 10.4 G/DL — LOW (ref 11.5–15.5)
IMM GRANULOCYTES NFR BLD AUTO: 0.4 % — SIGNIFICANT CHANGE UP (ref 0–0.9)
LYMPHOCYTES # BLD AUTO: 0.73 K/UL — LOW (ref 1–3.3)
LYMPHOCYTES # BLD AUTO: 5.8 % — LOW (ref 13–44)
MCHC RBC-ENTMCNC: 27.7 PG — SIGNIFICANT CHANGE UP (ref 27–34)
MCHC RBC-ENTMCNC: 31.2 GM/DL — LOW (ref 32–36)
MCV RBC AUTO: 88.8 FL — SIGNIFICANT CHANGE UP (ref 80–100)
MONOCYTES # BLD AUTO: 1.07 K/UL — HIGH (ref 0–0.9)
MONOCYTES NFR BLD AUTO: 8.5 % — SIGNIFICANT CHANGE UP (ref 2–14)
NEUTROPHILS # BLD AUTO: 10.59 K/UL — HIGH (ref 1.8–7.4)
NEUTROPHILS NFR BLD AUTO: 84.3 % — HIGH (ref 43–77)
PLATELET # BLD AUTO: 179 K/UL — SIGNIFICANT CHANGE UP (ref 150–400)
POTASSIUM SERPL-MCNC: 3.6 MMOL/L — SIGNIFICANT CHANGE UP (ref 3.5–5.3)
POTASSIUM SERPL-SCNC: 3.6 MMOL/L — SIGNIFICANT CHANGE UP (ref 3.5–5.3)
RBC # BLD: 3.75 M/UL — LOW (ref 3.8–5.2)
RBC # FLD: 13.9 % — SIGNIFICANT CHANGE UP (ref 10.3–14.5)
SODIUM SERPL-SCNC: 139 MMOL/L — SIGNIFICANT CHANGE UP (ref 135–145)
WBC # BLD: 12.56 K/UL — HIGH (ref 3.8–10.5)
WBC # FLD AUTO: 12.56 K/UL — HIGH (ref 3.8–10.5)

## 2024-09-07 PROCEDURE — 99233 SBSQ HOSP IP/OBS HIGH 50: CPT

## 2024-09-07 RX ORDER — POLYETHYLENE GLYCOL 3350 17 G/17G
17 POWDER, FOR SOLUTION ORAL DAILY
Refills: 0 | Status: DISCONTINUED | OUTPATIENT
Start: 2024-09-07 | End: 2024-09-10

## 2024-09-07 RX ADMIN — Medication 100 MILLILITER(S): at 05:11

## 2024-09-07 RX ADMIN — Medication 100 MILLILITER(S): at 21:43

## 2024-09-07 RX ADMIN — HYDROCORTISONE 50 MILLIGRAM(S): 10 TABLET ORAL at 13:29

## 2024-09-07 RX ADMIN — POLYETHYLENE GLYCOL 3350 17 GRAM(S): 17 POWDER, FOR SOLUTION ORAL at 13:23

## 2024-09-07 RX ADMIN — HYDROXYCHLOROQUINE SULFATE 200 MILLIGRAM(S): 200 TABLET, FILM COATED ORAL at 21:42

## 2024-09-07 RX ADMIN — HYDROCORTISONE 50 MILLIGRAM(S): 10 TABLET ORAL at 05:10

## 2024-09-07 RX ADMIN — AMLODIPINE BESYLATE 10 MILLIGRAM(S): 10 TABLET ORAL at 09:37

## 2024-09-07 RX ADMIN — Medication 20 MILLIGRAM(S): at 09:38

## 2024-09-07 RX ADMIN — HYDROXYCHLOROQUINE SULFATE 200 MILLIGRAM(S): 200 TABLET, FILM COATED ORAL at 12:04

## 2024-09-07 RX ADMIN — Medication 60 MILLIGRAM(S): at 09:38

## 2024-09-07 RX ADMIN — Medication 600 MILLIGRAM(S): at 05:10

## 2024-09-07 RX ADMIN — Medication 10 MILLIGRAM(S): at 13:23

## 2024-09-07 RX ADMIN — Medication 10 MILLIGRAM(S): at 05:10

## 2024-09-07 RX ADMIN — Medication 600 MILLIGRAM(S): at 13:23

## 2024-09-07 RX ADMIN — Medication 2 TABLET(S): at 21:41

## 2024-09-07 RX ADMIN — HYDROCORTISONE 50 MILLIGRAM(S): 10 TABLET ORAL at 21:40

## 2024-09-07 RX ADMIN — Medication 600 MILLIGRAM(S): at 21:42

## 2024-09-07 RX ADMIN — Medication 10 MILLIGRAM(S): at 21:41

## 2024-09-07 RX ADMIN — Medication 100 MILLILITER(S): at 12:03

## 2024-09-07 NOTE — PHYSICAL THERAPY INITIAL EVALUATION ADULT - NSPTDMEREC_GEN_A_CORE
Pt will require a rolling walker at home due to the dignosis of difficulty walking to help complete their MRADL's./rolling walker/bathing/toileting

## 2024-09-07 NOTE — PHYSICAL THERAPY INITIAL EVALUATION ADULT - GENERAL OBSERVATIONS, REHAB EVAL
Patient was lying supine, + PCA, + spinal drain, + medina, + VCD, incision on her back, pleasant and willing to participate in PT

## 2024-09-08 LAB
HCT VFR BLD CALC: 29.2 % — LOW (ref 34.5–45)
HGB BLD-MCNC: 9.2 G/DL — LOW (ref 11.5–15.5)
MCHC RBC-ENTMCNC: 27.9 PG — SIGNIFICANT CHANGE UP (ref 27–34)
MCHC RBC-ENTMCNC: 31.5 GM/DL — LOW (ref 32–36)
MCV RBC AUTO: 88.5 FL — SIGNIFICANT CHANGE UP (ref 80–100)
PLATELET # BLD AUTO: 175 K/UL — SIGNIFICANT CHANGE UP (ref 150–400)
RBC # BLD: 3.3 M/UL — LOW (ref 3.8–5.2)
RBC # FLD: 13.7 % — SIGNIFICANT CHANGE UP (ref 10.3–14.5)
WBC # BLD: 10.48 K/UL — SIGNIFICANT CHANGE UP (ref 3.8–10.5)
WBC # FLD AUTO: 10.48 K/UL — SIGNIFICANT CHANGE UP (ref 3.8–10.5)

## 2024-09-08 PROCEDURE — 99232 SBSQ HOSP IP/OBS MODERATE 35: CPT

## 2024-09-08 RX ORDER — NALOXONE HCL 1 MG/ML
0.1 VIAL (ML) INJECTION
Refills: 0 | Status: DISCONTINUED | OUTPATIENT
Start: 2024-09-08 | End: 2024-09-09

## 2024-09-08 RX ORDER — HYDROMORPHONE HYDROCHLORIDE 2 MG/1
30 TABLET ORAL
Refills: 0 | Status: DISCONTINUED | OUTPATIENT
Start: 2024-09-08 | End: 2024-09-09

## 2024-09-08 RX ORDER — ONDANSETRON 2 MG/ML
4 INJECTION, SOLUTION INTRAMUSCULAR; INTRAVENOUS EVERY 6 HOURS
Refills: 0 | Status: DISCONTINUED | OUTPATIENT
Start: 2024-09-08 | End: 2024-09-08

## 2024-09-08 RX ORDER — HYDROMORPHONE HYDROCHLORIDE 2 MG/1
0.5 TABLET ORAL
Refills: 0 | Status: DISCONTINUED | OUTPATIENT
Start: 2024-09-08 | End: 2024-09-09

## 2024-09-08 RX ADMIN — Medication 10 MILLIGRAM(S): at 13:41

## 2024-09-08 RX ADMIN — HYDROXYCHLOROQUINE SULFATE 200 MILLIGRAM(S): 200 TABLET, FILM COATED ORAL at 11:21

## 2024-09-08 RX ADMIN — Medication 600 MILLIGRAM(S): at 05:14

## 2024-09-08 RX ADMIN — Medication 10 MILLIGRAM(S): at 05:13

## 2024-09-08 RX ADMIN — Medication 600 MILLIGRAM(S): at 13:41

## 2024-09-08 RX ADMIN — Medication 60 MILLIGRAM(S): at 11:21

## 2024-09-08 RX ADMIN — Medication 10 MILLIGRAM(S): at 22:23

## 2024-09-08 RX ADMIN — Medication 600 MILLIGRAM(S): at 22:23

## 2024-09-08 RX ADMIN — Medication 100 MILLILITER(S): at 05:14

## 2024-09-08 RX ADMIN — AMLODIPINE BESYLATE 10 MILLIGRAM(S): 10 TABLET ORAL at 11:21

## 2024-09-08 RX ADMIN — HYDROMORPHONE HYDROCHLORIDE 30 MILLILITER(S): 2 TABLET ORAL at 22:05

## 2024-09-08 RX ADMIN — HYDROXYCHLOROQUINE SULFATE 200 MILLIGRAM(S): 200 TABLET, FILM COATED ORAL at 22:23

## 2024-09-08 RX ADMIN — POLYETHYLENE GLYCOL 3350 17 GRAM(S): 17 POWDER, FOR SOLUTION ORAL at 11:22

## 2024-09-08 RX ADMIN — Medication 20 MILLIGRAM(S): at 11:21

## 2024-09-08 RX ADMIN — Medication 2 TABLET(S): at 22:23

## 2024-09-09 LAB
ANION GAP SERPL CALC-SCNC: 3 MMOL/L — LOW (ref 5–17)
BUN SERPL-MCNC: 13 MG/DL — SIGNIFICANT CHANGE UP (ref 7–23)
CALCIUM SERPL-MCNC: 9 MG/DL — SIGNIFICANT CHANGE UP (ref 8.5–10.1)
CHLORIDE SERPL-SCNC: 108 MMOL/L — SIGNIFICANT CHANGE UP (ref 96–108)
CO2 SERPL-SCNC: 29 MMOL/L — SIGNIFICANT CHANGE UP (ref 22–31)
CREAT SERPL-MCNC: 0.59 MG/DL — SIGNIFICANT CHANGE UP (ref 0.5–1.3)
EGFR: 102 ML/MIN/1.73M2 — SIGNIFICANT CHANGE UP
GLUCOSE SERPL-MCNC: 165 MG/DL — HIGH (ref 70–99)
HCT VFR BLD CALC: 30.5 % — LOW (ref 34.5–45)
HGB BLD-MCNC: 9.5 G/DL — LOW (ref 11.5–15.5)
MCHC RBC-ENTMCNC: 27.9 PG — SIGNIFICANT CHANGE UP (ref 27–34)
MCHC RBC-ENTMCNC: 31.1 GM/DL — LOW (ref 32–36)
MCV RBC AUTO: 89.4 FL — SIGNIFICANT CHANGE UP (ref 80–100)
PLATELET # BLD AUTO: 187 K/UL — SIGNIFICANT CHANGE UP (ref 150–400)
POTASSIUM SERPL-MCNC: 3.6 MMOL/L — SIGNIFICANT CHANGE UP (ref 3.5–5.3)
POTASSIUM SERPL-SCNC: 3.6 MMOL/L — SIGNIFICANT CHANGE UP (ref 3.5–5.3)
RBC # BLD: 3.41 M/UL — LOW (ref 3.8–5.2)
RBC # FLD: 13.9 % — SIGNIFICANT CHANGE UP (ref 10.3–14.5)
SODIUM SERPL-SCNC: 140 MMOL/L — SIGNIFICANT CHANGE UP (ref 135–145)
WBC # BLD: 9.53 K/UL — SIGNIFICANT CHANGE UP (ref 3.8–10.5)
WBC # FLD AUTO: 9.53 K/UL — SIGNIFICANT CHANGE UP (ref 3.8–10.5)

## 2024-09-09 PROCEDURE — 99232 SBSQ HOSP IP/OBS MODERATE 35: CPT

## 2024-09-09 RX ORDER — ACETAMINOPHEN 325 MG/1
1000 TABLET ORAL ONCE
Refills: 0 | Status: COMPLETED | OUTPATIENT
Start: 2024-09-09 | End: 2024-09-09

## 2024-09-09 RX ORDER — OXYCODONE HYDROCHLORIDE 5 MG/1
10 TABLET ORAL EVERY 4 HOURS
Refills: 0 | Status: DISCONTINUED | OUTPATIENT
Start: 2024-09-09 | End: 2024-09-10

## 2024-09-09 RX ADMIN — Medication 4 MILLIGRAM(S): at 22:07

## 2024-09-09 RX ADMIN — Medication 60 MILLIGRAM(S): at 10:19

## 2024-09-09 RX ADMIN — OXYCODONE HYDROCHLORIDE 10 MILLIGRAM(S): 5 TABLET ORAL at 19:30

## 2024-09-09 RX ADMIN — Medication 1 MILLIGRAM(S): at 21:14

## 2024-09-09 RX ADMIN — Medication 2 TABLET(S): at 21:14

## 2024-09-09 RX ADMIN — Medication 4 MILLIGRAM(S): at 21:37

## 2024-09-09 RX ADMIN — Medication 10 MILLIGRAM(S): at 13:27

## 2024-09-09 RX ADMIN — HYDROXYCHLOROQUINE SULFATE 200 MILLIGRAM(S): 200 TABLET, FILM COATED ORAL at 21:14

## 2024-09-09 RX ADMIN — Medication 20 MILLIGRAM(S): at 10:19

## 2024-09-09 RX ADMIN — ACETAMINOPHEN 400 MILLIGRAM(S): 325 TABLET ORAL at 14:26

## 2024-09-09 RX ADMIN — AMLODIPINE BESYLATE 10 MILLIGRAM(S): 10 TABLET ORAL at 10:18

## 2024-09-09 RX ADMIN — Medication 10 MILLIGRAM(S): at 05:19

## 2024-09-09 RX ADMIN — Medication 10 MILLIGRAM(S): at 21:14

## 2024-09-09 RX ADMIN — ACETAMINOPHEN 1000 MILLIGRAM(S): 325 TABLET ORAL at 14:43

## 2024-09-09 RX ADMIN — Medication 600 MILLIGRAM(S): at 05:19

## 2024-09-09 RX ADMIN — HYDROXYCHLOROQUINE SULFATE 200 MILLIGRAM(S): 200 TABLET, FILM COATED ORAL at 10:19

## 2024-09-09 RX ADMIN — OXYCODONE HYDROCHLORIDE 10 MILLIGRAM(S): 5 TABLET ORAL at 14:15

## 2024-09-09 RX ADMIN — OXYCODONE HYDROCHLORIDE 10 MILLIGRAM(S): 5 TABLET ORAL at 18:52

## 2024-09-09 RX ADMIN — Medication 600 MILLIGRAM(S): at 13:27

## 2024-09-09 RX ADMIN — Medication 600 MILLIGRAM(S): at 21:14

## 2024-09-09 RX ADMIN — OXYCODONE HYDROCHLORIDE 10 MILLIGRAM(S): 5 TABLET ORAL at 13:26

## 2024-09-09 NOTE — PROGRESS NOTE ADULT - ASSESSMENT
58 yo WB presents for definitive treatment of adjacent level HNP/DDDD/stenosis. Patient underwent lami/fusion L2-5 2 yrs ago. Patient noted onset of back pain and leg pain. Studies revealed large extruded HNP at L1/2 with stenosis and DDD. Patient failed nonoperative modalities and underwent exploration fusion, removal hardware L12-5, lami L1/2, exc HNP L1/2, PSF T10-L5 with SSI. LBG. BMP, allograft on 9/6/24.    POD#3  D/C PCA-start oral analgesia  Increase OOB/PT  Monitor BETITO  Monitor labs  Medical management  D/C planning with HC when drain removed

## 2024-09-10 ENCOUNTER — TRANSCRIPTION ENCOUNTER (OUTPATIENT)
Age: 59
End: 2024-09-10

## 2024-09-10 VITALS
HEART RATE: 82 BPM | RESPIRATION RATE: 16 BRPM | OXYGEN SATURATION: 96 % | SYSTOLIC BLOOD PRESSURE: 141 MMHG | TEMPERATURE: 98 F | DIASTOLIC BLOOD PRESSURE: 81 MMHG

## 2024-09-10 PROCEDURE — 99232 SBSQ HOSP IP/OBS MODERATE 35: CPT

## 2024-09-10 RX ORDER — GABAPENTIN 100 MG
1 CAPSULE ORAL
Refills: 0 | DISCHARGE

## 2024-09-10 RX ORDER — GABAPENTIN 100 MG
2 CAPSULE ORAL
Qty: 0 | Refills: 0 | DISCHARGE
Start: 2024-09-10

## 2024-09-10 RX ORDER — GABAPENTIN 100 MG
600 CAPSULE ORAL
Qty: 0 | Refills: 0 | DISCHARGE
Start: 2024-09-10

## 2024-09-10 RX ORDER — ACETAMINOPHEN 325 MG/1
2 TABLET ORAL
Qty: 0 | Refills: 0 | DISCHARGE
Start: 2024-09-10

## 2024-09-10 RX ADMIN — Medication 60 MILLIGRAM(S): at 09:29

## 2024-09-10 RX ADMIN — OXYCODONE HYDROCHLORIDE 10 MILLIGRAM(S): 5 TABLET ORAL at 08:30

## 2024-09-10 RX ADMIN — AMLODIPINE BESYLATE 10 MILLIGRAM(S): 10 TABLET ORAL at 09:29

## 2024-09-10 RX ADMIN — Medication 600 MILLIGRAM(S): at 05:41

## 2024-09-10 RX ADMIN — Medication 20 MILLIGRAM(S): at 09:30

## 2024-09-10 RX ADMIN — Medication 10 MILLIGRAM(S): at 05:41

## 2024-09-10 RX ADMIN — OXYCODONE HYDROCHLORIDE 10 MILLIGRAM(S): 5 TABLET ORAL at 00:03

## 2024-09-10 RX ADMIN — HYDROXYCHLOROQUINE SULFATE 200 MILLIGRAM(S): 200 TABLET, FILM COATED ORAL at 09:30

## 2024-09-10 RX ADMIN — OXYCODONE HYDROCHLORIDE 10 MILLIGRAM(S): 5 TABLET ORAL at 07:44

## 2024-09-10 RX ADMIN — OXYCODONE HYDROCHLORIDE 10 MILLIGRAM(S): 5 TABLET ORAL at 12:43

## 2024-09-10 RX ADMIN — ACETAMINOPHEN 650 MILLIGRAM(S): 325 TABLET ORAL at 12:13

## 2024-09-10 RX ADMIN — OXYCODONE HYDROCHLORIDE 10 MILLIGRAM(S): 5 TABLET ORAL at 00:33

## 2024-09-10 NOTE — DISCHARGE NOTE PROVIDER - NSDCFUADDINST_GEN_ALL_CORE_FT
Notify office if outer dressing comes off. Use bacitracin on excoriations adjacent to dressing. See Dr. Garcia next week at [address] office. Call [facility] for christoph't.

## 2024-09-10 NOTE — DISCHARGE NOTE NURSING/CASE MANAGEMENT/SOCIAL WORK - PATIENT PORTAL LINK FT
You can access the FollowMyHealth Patient Portal offered by [facility] by registering at the following website: [email or web address]. By joining [employer]’s FollowMyHealth portal, you will also be able to view your health information using other applications (apps) compatible with our system.

## 2024-09-10 NOTE — DISCHARGE NOTE NURSING/CASE MANAGEMENT/SOCIAL WORK - NSDCPEFALRISK_GEN_ALL_CORE
For information on Fall & Injury Prevention, visit: [email or web address] OR  [email or web address] OR  https://www.cdc.gov/steadi/patient.html

## 2024-09-10 NOTE — DISCHARGE NOTE PROVIDER - HOSPITAL COURSE
60 yo WB presents for definitive treatment of adjacent level HNP/DDDD/stenosis. Patient underwent lami/fusion L2-5 2 yrs ago. Patient noted onset of back pain and leg pain. Studies revealed large extruded HNP at L1/2 with stenosis and DDD. Patient failed nonoperative modalities and underwent exploration fusion, removal hardware L12-5, lami L1/2, exc HNP L1/2, PSF T10-L5 with SSI. LBG. BMP, allograft on 9/6/24.    9/7/24 Patient with incisional pain without radicular pain. PCA, BETITO with moderate drainage, medina with excellent U/O  9/8/24 Patient comfortable, PCA continues, BETITO with persistent drainage, Dressing intact.  9/9/24 Patient feels well-no radicular pain. PCA d/c'd, BETITO with persistent drainage. OOB ambulating with PT  9/10/24 Patient comfortable. BETITO dislodged. Incision clean and dry, (+) excoriations from Tegaderm lateral to dressing, Neuro intact. H&H stable. Patient stable for D/C with HC.

## 2024-09-10 NOTE — DISCHARGE NOTE PROVIDER - NSDCCPCAREPLAN_GEN_ALL_CORE_FT
PRINCIPAL DISCHARGE DIAGNOSIS  Diagnosis: Herniation of intervertebral disc at L1-L2 level  Assessment and Plan of Treatment:

## 2024-09-10 NOTE — DISCHARGE NOTE PROVIDER - NSDCMRMEDTOKEN_GEN_ALL_CORE_FT
acetaminophen 325 mg oral tablet: 2 tab(s) orally every 6 hours As needed Temp greater or equal to 38.5C (101.3F), Mild Pain (1 - 3)  amLODIPine 10 mg oral tablet: 1 tab(s) orally once a day  clonazePAM 1 mg oral tablet: 1 tab(s) orally once a day (at bedtime)  DULoxetine 60 mg oral delayed release capsule: 1 cap(s) orally once a day (at bedtime)  Flexeril 10 mg oral tablet: 1 tab(s) orally once a day (at bedtime)  gabapentin 300 mg oral capsule: 2 cap(s) orally 3 times a day  gabapentin 600 mg oral tablet: 600 milligram(s) orally 3 times a day  hydroxychloroquine 200 mg oral tablet: 1 tab(s) orally 2 times a day  leflunomide 20 mg oral tablet: 1 tab(s) orally once a day (in the morning)  lisinopril 10 mg oral tablet: 1 tab(s) orally 2 times a day  oxyCODONE 10 mg oral tablet: 1 tab(s) orally 5 times a day  predniSONE 20 mg oral tablet: 1 tab(s) orally once a day  tiZANidine 4 mg oral capsule: 2 cap(s) orally once a day

## 2024-09-10 NOTE — DISCHARGE NOTE PROVIDER - CARE PROVIDER_API CALL
Vasyl Schaefer  Orthopaedic Surgery  [address] [place] [id number]  Phone: [phone]  Fax: [phone]  Follow Up Time:     Trey Garcia  Plastic Surgery  [address] [facility], [address] [place] [address]  Phone: [phone]  Fax: [phone]  Follow Up Time:

## 2024-09-10 NOTE — PROGRESS NOTE ADULT - SUBJECTIVE AND OBJECTIVE BOX
PCP: Dr. Raul no    CHIEF COMPLAINT: chronic back pain    HISTORY OF THE PRESENT ILLNESS: this is a 60 yo female with pmh: anxiety/depression, asthma, HTN, OA of B/L knees morbid obesity, BMI 37.9, peripheral edema, RA, chronic low back pain with radiculopathy to bilateral LEs, with h/o lumbar fusion 2 years ago. However, she states that she still has constant pain in her lower back that ends in the coccyx. She is now seen in PACU S/P B/L laminectomy, revision or prior laminectomy, exploration fusion, removal of instrumentation, posterior fusion and instrumentation of L2-5.  Plastic surgery was also involved for posterior wound closure.  Pt is crying, c/o postop pain,  Dilaudid PCA being set up for pt use.  B/p earlier was high 194./109, but better now normotensive 2/2 to better pain control.  We are consulted for medical management      9/9: pt seen at bedside, daughter present, complaining of pain, was medicated with Oxycodone, gabapentin and flexeril 30 mins prior to my visits.   ordered IV tylenol x 1, seen up with PT earlier, pt amb from room to PT room and back again.  she did well      ROS:   All 10 systems reviewed and found to be negative with the exception of what has been described above.    Vital Signs Last 24 Hrs  T(C): 37 (09 Sep 2024 08:18), Max: 37.2 (08 Sep 2024 20:00)  T(F): 98.6 (09 Sep 2024 08:18), Max: 99 (08 Sep 2024 20:00)  HR: 85 (09 Sep 2024 08:18) (84 - 100)  BP: 127/79 (09 Sep 2024 08:18) (103/59 - 127/79)  BP(mean): 87 (08 Sep 2024 20:00) (87 - 92)  RR: 16 (09 Sep 2024 08:18) (16 - 18)  SpO2: 95% (09 Sep 2024 08:18) (93% - 98%)    Parameters below as of 09 Sep 2024 08:18  Patient On (Oxygen Delivery Method): room air      MEDICATIONS  (STANDING):  amLODIPine   Tablet 10 milliGRAM(s) Oral daily  clonazePAM  Tablet 1 milliGRAM(s) Oral at bedtime  cyclobenzaprine 10 milliGRAM(s) Oral every 8 hours  DULoxetine 60 milliGRAM(s) Oral daily  gabapentin 600 milliGRAM(s) Oral three times a day  hydroxychloroquine 200 milliGRAM(s) Oral two times a day  lactated ringers. 1000 milliLiter(s) (100 mL/Hr) IV Continuous <Continuous>  polyethylene glycol 3350 17 Gram(s) Oral daily  predniSONE   Tablet 20 milliGRAM(s) Oral daily  senna 2 Tablet(s) Oral at bedtime      MEDICATIONS  (PRN):  acetaminophen     Tablet .. 650 milliGRAM(s) Oral every 6 hours PRN Temp greater or equal to 38.5C (101.3F), Mild Pain (1 - 3)  diazepam    Tablet 5 milliGRAM(s) Oral every 12 hours PRN muscle spasm  morphine  - Injectable 4 milliGRAM(s) IV Push every 3 hours PRN Severe Pain (7 - 10)  ondansetron Injectable 4 milliGRAM(s) IV Push every 6 hours PRN Nausea and/or Vomiting  oxyCODONE    IR 10 milliGRAM(s) Oral every 4 hours PRN Moderate Pain (4 - 6)      PHYSICAL EXAM:    GENERAL: Comfortable, no acute distress   HEAD:  Normocephalic, atraumatic  EYES: EOMI, PERRLA  HEENT: Moist mucous membranes  NECK: Supple, No JVD  NERVOUS SYSTEM:  Alert & Oriented X3, Motor Strength 5/5 B/L upper and lower extremities  CHEST/LUNG: Clear to auscultation bilaterally  HEART: Regular rate and rhythm  ABDOMEN: Soft, non tender, Nondistended, Bowel sounds present  GENITOURINARY: voiding  EXTREMITIES:   No clubbing, cyanosis, or edema  MUSCULOSKELETAL- back dsg c/d/i + BETITO  SKIN-no rash    LABS:                                        9.5    9.53  )-----------( 187      ( 09 Sep 2024 09:51 )             30.5       09-09    140  |  108  |  13  ----------------------------<  165<H>  3.6   |  29  |  0.59    Ca    9.0      09 Sep 2024 09:51            IMPRESSION: this is a 60 yo female with pmh: anxiety/depression, asthma, HTN, OA of B/L knees morbid obesity, BMI 37.9, peripheral edema, RA, chronic low back pain with radiculopathy to BLE, with h/o lumbar fusion 2 years ago. However, she states that she still has constant pain in her lower back that ends in the coccyx. S/P B/L laminectomy, revision or prior laminectomy, exploration fusion, removal of instrumentation, posterior fusion and instrumentation of L2-5.  Plastic surgery was also involved for posterior wound closure.      Plan:   #  s/p  Lami/PSF with instr T10-L5  POD#3   drain to remain in place  pain control with po oxycodone  PT eval  wound care per spine surgery  cont gabapentin and flexeril  bowel regimen , having BM's daily      #RA   on hydroxychloroquine  also on chronic steroids: prednisone 20 mg po daily      #Hypertension - controlled  cont amlodipine, resume lisinopril if Hypertensive      # anxiety/depression   cont clonazepam, duloxetine    # morbid obesity/Pre-dm  A1C 6.2  dietary/lifestyle modifications    #CARINA, no h/o  STOP BANG score #4: intermediate risk  monitor o2 sats, supplement with O2 NC to maintain sats > 92%    #VTE prophylaxis  hold chemical prophylaxis until ok with surgery  venodynes BLE  mobilize as much as possible  CAPRINI 6           
  PCP: Dr. Raul no    CHIEF COMPLAINT: chronic back pain    HISTORY OF THE PRESENT ILLNESS: this is a 60 yo female with pmh: anxiety/depression, asthma, HTN, OA of B/L knees morbid obesity, BMI 37.9, peripheral edema, RA, chronic low back pain with radiculopathy to bilateral LEs, with h/o lumbar fusion 2 years ago. However, she states that she still has constant pain in her lower back that ends in the coccyx. She is now seen in PACU S/P B/L laminectomy, revision or prior laminectomy, exploration fusion, removal of instrumentation, posterior fusion and instrumentation of L2-5.  Plastic surgery was also involved for posterior wound closure.  Pt is crying, c/o postop pain,  Dilaudid PCA being set up for pt use.  B/p earlier was high 194./109, but better now normotensive 2/2 to better pain control.  We are consulted for medical management    9/7 - pain controlled with pCA.  last BM 2 days ago. no abd pain n/v/d. no fever or chills  9/8 - pain well controlled with PCA.  had BM yesterday.  no n/v/d      ROS:   All 10 systems reviewed and found to be negative with the exception of what has been described above.      Vital Signs Last 24 Hrs  T(C): 36.8 (08 Sep 2024 08:00), Max: 37.2 (07 Sep 2024 16:00)  T(F): 98.2 (08 Sep 2024 08:00), Max: 98.9 (07 Sep 2024 16:00)  HR: 82 (08 Sep 2024 08:00) (82 - 97)  BP: 106/61 (08 Sep 2024 08:00) (106/61 - 139/67)  BP(mean): 69 (07 Sep 2024 20:00) (69 - 83)  RR: 18 (08 Sep 2024 08:00) (18 - 18)  SpO2: 94% (08 Sep 2024 08:00) (94% - 98%)    Parameters below as of 08 Sep 2024 08:00  Patient On (Oxygen Delivery Method): room air        PHYSICAL EXAM:    GENERAL: Comfortable, no acute distress   HEAD:  Normocephalic, atraumatic  EYES: EOMI, PERRLA  HEENT: Moist mucous membranes  NECK: Supple, No JVD  NERVOUS SYSTEM:  Alert & Oriented X3, Motor Strength 5/5 B/L upper and lower extremities  CHEST/LUNG: Clear to auscultation bilaterally  HEART: Regular rate and rhythm  ABDOMEN: Soft, non tender, Nondistended, Bowel sounds present  GENITOURINARY: medina  EXTREMITIES:   No clubbing, cyanosis, or edema  MUSCULOSKELETAL- back dsg c/d/i + BETITO  SKIN-no rash    LABS: preop labs reviewed                                9.2    10.48 )-----------( 175      ( 08 Sep 2024 08:06 )             29.2     09-07    139  |  109<H>  |  14  ----------------------------<  153<H>  3.6   |  26  |  0.66    Ca    8.6      07 Sep 2024 07:57              Urinalysis Basic - ( 07 Sep 2024 07:57 )    Color: x / Appearance: x / SG: x / pH: x  Gluc: 153 mg/dL / Ketone: x  / Bili: x / Urobili: x   Blood: x / Protein: x / Nitrite: x   Leuk Esterase: x / RBC: x / WBC x   Sq Epi: x / Non Sq Epi: x / Bacteria: x                    IMPRESSION: this is a 60 yo female with pmh: anxiety/depression, asthma, HTN, OA of B/L knees morbid obesity, BMI 37.9, peripheral edema, RA, chronic low back pain with radiculopathy to BLE, with h/o lumbar fusion 2 years ago. However, she states that she still has constant pain in her lower back that ends in the coccyx. She is now seen in PACU S/P B/L laminectomy, revision or prior laminectomy, exploration fusion, removal of instrumentation, posterior fusion and instrumentation of L2-5.  Plastic surgery was also involved for posterior wound closure.      Plan:   #  s/p  Lami/PSF with instr T10-L5           POD # 2  - case d/w dr herrera.  drain to remain in place  pain control with Dilaudid PCA  PT eval  wound care per spine surgery  cont gabapentin and flexeril  bowel regimen - continue senna will add miralax   IVFs until taking po  recommend dc [facility]       #RA   on hydroxychloroquine  also on chronic steroids: prednisone 20 mg po daily  not clear in chart review if or when pt stopped prednisone  pt did receive Solu-cortef 100 mg IV intraop  resume prednisone 20 mg po daily   stress dose Hydrocortisone 50 mg IV q 8 h x 3 doses    #Hypertension - controlled  cont amlodipine, hold lisinopril to prevent postop hypotension  pt is Hypertensive presently in PACU most likely from pain  monitor B/P may need to resume Lisinopril    # anxiety/depression   cont clonazepam, duloxetine    # morbid obesity/Pre-dm  A1C 6.2  dietary/lifestyle modifications    #CARINA, no h/o  STOP BANG score #4: intermediate risk  monitor o2 sats, supplement with O2 NC to maintain sats > 92%    #VTE prophylaxis  hold chemical prophylaxis until ok with surgery  venodynes BLE  mobilize as much as possible  CAPRINI 6     Thank you for the consult, will follow with you           
  PCP: Dr. Raul no    CHIEF COMPLAINT: chronic back pain    HISTORY OF THE PRESENT ILLNESS: this is a 60 yo female with pmh: anxiety/depression, asthma, HTN, OA of B/L knees morbid obesity, BMI 37.9, peripheral edema, RA, chronic low back pain with radiculopathy to bilateral LEs, with h/o lumbar fusion 2 years ago. However, she states that she still has constant pain in her lower back that ends in the coccyx. She is now seen in PACU S/P B/L laminectomy, revision or prior laminectomy, exploration fusion, removal of instrumentation, posterior fusion and instrumentation of L2-5.  Plastic surgery was also involved for posterior wound closure.  We are consulted for medical management      9/10: pt seen at bedside, going home today,, seen up with PT earlier, pt amb from room to PT room and back again.  she did well      ROS:   All 10 systems reviewed and found to be negative with the exception of what has been described above.    Vital Signs Last 24 Hrs  T(C): 36.8 (10 Sep 2024 07:45), Max: 37.3 (09 Sep 2024 16:00)  T(F): 98.2 (10 Sep 2024 07:45), Max: 99.1 (09 Sep 2024 16:00)  HR: 82 (10 Sep 2024 07:45) (82 - 93)  BP: 141/81 (10 Sep 2024 07:45) (116/72 - 141/81)  BP(mean): 80 (09 Sep 2024 16:00) (80 - 80)  RR: 16 (10 Sep 2024 07:45) (16 - 17)  SpO2: 96% (10 Sep 2024 07:45) (96% - 100%)    Parameters below as of 10 Sep 2024 07:45  Patient On (Oxygen Delivery Method): room air    MEDICATIONS  (STANDING):  amLODIPine   Tablet 10 milliGRAM(s) Oral daily  clonazePAM  Tablet 1 milliGRAM(s) Oral at bedtime  cyclobenzaprine 10 milliGRAM(s) Oral every 8 hours  DULoxetine 60 milliGRAM(s) Oral daily  gabapentin 600 milliGRAM(s) Oral three times a day  hydroxychloroquine 200 milliGRAM(s) Oral two times a day  lactated ringers. 1000 milliLiter(s) (100 mL/Hr) IV Continuous <Continuous>  polyethylene glycol 3350 17 Gram(s) Oral daily  predniSONE   Tablet 20 milliGRAM(s) Oral daily  senna 2 Tablet(s) Oral at bedtime  tranexamic acid IVPB 2000 milliGRAM(s) IV Intermittent once  tranexamic acid IVPB 2000 milliGRAM(s) IV Intermittent once    MEDICATIONS  (PRN):  acetaminophen     Tablet .. 650 milliGRAM(s) Oral every 6 hours PRN Temp greater or equal to 38.5C (101.3F), Mild Pain (1 - 3)  diazepam    Tablet 5 milliGRAM(s) Oral every 12 hours PRN muscle spasm  morphine  - Injectable 4 milliGRAM(s) IV Push every 3 hours PRN Severe Pain (7 - 10)  ondansetron Injectable 4 milliGRAM(s) IV Push every 6 hours PRN Nausea and/or Vomiting  oxyCODONE    IR 10 milliGRAM(s) Oral every 4 hours PRN Moderate Pain (4 - 6)      PHYSICAL EXAM:    GENERAL: Comfortable, no acute distress   HEAD:  Normocephalic, atraumatic  EYES: EOMI, PERRLA  HEENT: Moist mucous membranes  NECK: Supple, No JVD  NERVOUS SYSTEM:  Alert & Oriented X3, Motor Strength 5/5 B/L upper and lower extremities  CHEST/LUNG: Clear to auscultation bilaterally  HEART: Regular rate and rhythm  ABDOMEN: Soft, non tender, Nondistended, Bowel sounds present  GENITOURINARY: voiding  EXTREMITIES:   No clubbing, cyanosis, or edema  MUSCULOSKELETAL- back dsg c/d/i , BETITO out  SKIN-no rash    LABS:                                                          9.5    9.53  )-----------( 187      ( 09 Sep 2024 09:51 )             30.5       09-09    140  |  108  |  13  ----------------------------<  165<H>  3.6   |  29  |  0.59    Ca    9.0      09 Sep 2024 09:51            IMPRESSION: this is a 60 yo female with pmh: anxiety/depression, asthma, HTN, OA of B/L knees morbid obesity, BMI 37.9, peripheral edema, RA, chronic low back pain with radiculopathy to BLE, with h/o lumbar fusion 2 years ago. However, she stated that she  has constant pain in her lower back that ends in the coccyx. S/P B/L laminectomy, revision or prior laminectomy, exploration fusion, removal of instrumentation, posterior fusion and instrumentation of L2-5.  Plastic surgery was also involved for posterior wound closure.      Plan:   #  s/p  Lami/PSF with instr T10-L5  POD#4   pain control with po oxycodone  PT eval  wound care per spine surgery  cont gabapentin and flexeril  bowel regimen , having BM's daily      #RA   on hydroxychloroquine  also on chronic steroids: prednisone 20 mg po daily      #Hypertension - controlled  cont amlodipine, resume lisinopril if Hypertensive      # anxiety/depression   cont clonazepam, duloxetine    # morbid obesity/Pre-dm  A1C 6.2  dietary/lifestyle modifications    #CARINA, no h/o  STOP BANG score #4: intermediate risk  monitor o2 sats, supplement with O2 NC to maintain sats > 92%    #VTE prophylaxis  hold chemical prophylaxis until ok with surgery  venodynes BLE  mobilize as much as possible  CAPRINI 6     dispo: home today      
POST OPERATIVE DAY #:  1  STATUS POST:    Lami/ Post fusion with instr T10-L5                  SUBJECTIVE: Patient seen and examined. Doing well. Leg pain has improved since surgery.    OBJECTIVE:     Vital Signs Last 24 Hrs  T(C): 37.1 (07 Sep 2024 08:28), Max: 37.1 (07 Sep 2024 08:28)  T(F): 98.8 (07 Sep 2024 08:28), Max: 98.8 (07 Sep 2024 08:28)  HR: 98 (07 Sep 2024 08:28) (61 - 98)  BP: 134/63 (07 Sep 2024 08:28) (108/60 - 198/101)  BP(mean): --  RR: 17 (07 Sep 2024 08:28) (12 - 17)  SpO2: 91% (07 Sep 2024 08:28) (91% - 100%)    Parameters below as of 07 Sep 2024 08:28  Patient On (Oxygen Delivery Method): room air        Awake and alert  HEENT: unremarkable  Neck: supple  Back:          Dressing: clean/dry/intact             Drains: output per shift = 90cc  Bilateral Upper Extremities:         Good Motor Strength         Sensation intact  Bilateral Lower Extremities:          Good Motor Strength          Sensation Intact                   I&O's Detail    06 Sep 2024 07:01  -  07 Sep 2024 07:00  --------------------------------------------------------  IN:    Lactated Ringers: 464 mL    Other (mL): 3700 mL  Total IN: 4164 mL    OUT:    Bulb (mL): 225 mL    Indwelling Catheter - Urethral (mL): 1835 mL    Other (mL): 235 mL  Total OUT: 2295 mL    Total NET: 1869 mL      07 Sep 2024 07:01  -  07 Sep 2024 11:45  --------------------------------------------------------  IN:  Total IN: 0 mL    OUT:    Bulb (mL): 90 mL  Total OUT: 90 mL    Total NET: -90 mL          LABS:                        10.4   12.56 )-----------( 179      ( 07 Sep 2024 07:57 )             33.3     09-07    139  |  109<H>  |  14  ----------------------------<  153<H>  3.6   |  26  |  0.66    Ca    8.6      07 Sep 2024 07:57      PT/INR - ( 06 Sep 2024 06:43 )   PT: 9.7 sec;   INR: 0.85 ratio         PTT - ( 06 Sep 2024 06:43 )  PTT:22.7 sec      A/P :  59y Female s/p  Lami/PSF with instr T10-L5           POD # 1  -    Pain control  -    DVT ppx: SCDs    -    OOB as tolerated  -    Physical Therapy, re; ambulation  -    Continue hemovac  -    Advance diet  -    Continue present treatment  
  PCP: Dr. Raul no    CHIEF COMPLAINT: chronic back pain    HISTORY OF THE PRESENT ILLNESS: this is a 58 yo female with pmh: anxiety/depression, asthma, HTN, OA of B/L knees morbid obesity, BMI 37.9, peripheral edema, RA, chronic low back pain with radiculopathy to bilateral LEs, with h/o lumbar fusion 2 years ago. However, she states that she still has constant pain in her lower back that ends in the coccyx. She is now seen in PACU S/P B/L laminectomy, revision or prior laminectomy, exploration fusion, removal of instrumentation, posterior fusion and instrumentation of L2-5.  Plastic surgery was also involved for posterior wound closure.  Pt is crying, c/o postop pain,  Dilaudid PCA being set up for pt use.  B/p earlier was high 194./109, but better now normotensive 2/2 to better pain control.  We are consulted for medical management    9/7 - pain controlled with pCA.  last BM 2 days ago. no abd pain n/v/d. no fever or chills    ROS:   All 10 systems reviewed and found to be negative with the exception of what has been described above.    Vital Signs Last 24 Hrs  T(C): 37.1 (07 Sep 2024 08:28), Max: 37.1 (07 Sep 2024 08:28)  T(F): 98.8 (07 Sep 2024 08:28), Max: 98.8 (07 Sep 2024 08:28)  HR: 98 (07 Sep 2024 08:28) (61 - 98)  BP: 134/63 (07 Sep 2024 08:28) (108/60 - 198/101)  BP(mean): --  RR: 17 (07 Sep 2024 08:28) (12 - 17)  SpO2: 91% (07 Sep 2024 08:28) (91% - 100%)    Parameters below as of 07 Sep 2024 08:28  Patient On (Oxygen Delivery Method): room air      PHYSICAL EXAM:    GENERAL: Comfortable, no acute distress   HEAD:  Normocephalic, atraumatic  EYES: EOMI, PERRLA  HEENT: Moist mucous membranes  NECK: Supple, No JVD  NERVOUS SYSTEM:  Alert & Oriented X3, Motor Strength 5/5 B/L upper and lower extremities  CHEST/LUNG: Clear to auscultation bilaterally  HEART: Regular rate and rhythm  ABDOMEN: Soft, non tender, Nondistended, Bowel sounds present  GENITOURINARY: medina  EXTREMITIES:   No clubbing, cyanosis, or edema  MUSCULOSKELETAL- back dsg c/d/i + BETITO  SKIN-no rash    LABS: preop labs reviewed                              10.4   12.56 )-----------( 179      ( 07 Sep 2024 07:57 )             33.3     09-07    139  |  109<H>  |  14  ----------------------------<  153<H>  3.6   |  26  |  0.66    Ca    8.6      07 Sep 2024 07:57            PT/INR - ( 06 Sep 2024 06:43 )   PT: 9.7 sec;   INR: 0.85 ratio         PTT - ( 06 Sep 2024 06:43 )  PTT:22.7 sec  Urinalysis Basic - ( 07 Sep 2024 07:57 )    Color: x / Appearance: x / SG: x / pH: x  Gluc: 153 mg/dL / Ketone: x  / Bili: x / Urobili: x   Blood: x / Protein: x / Nitrite: x   Leuk Esterase: x / RBC: x / WBC x   Sq Epi: x / Non Sq Epi: x / Bacteria: x        IMPRESSION: this is a 58 yo female with pmh: anxiety/depression, asthma, HTN, OA of B/L knees morbid obesity, BMI 37.9, peripheral edema, RA, chronic low back pain with radiculopathy to BLE, with h/o lumbar fusion 2 years ago. However, she states that she still has constant pain in her lower back that ends in the coccyx. She is now seen in PACU S/P B/L laminectomy, revision or prior laminectomy, exploration fusion, removal of instrumentation, posterior fusion and instrumentation of L2-5.  Plastic surgery was also involved for posterior wound closure.      Plan:   #  s/p  Lami/PSF with instr T10-L5           POD # 1  - case d/w dr herrera.  drain to remain in place  pain control with Dilaudid PCA  PT eval  wound care per spine surgery  cont gabapentin and flexeril  bowel regimen - continue senna will add miralax   IVFs until taking po  [facility]      #RA   on hydroxychloroquine  also on chronic steroids: prednisone 20 mg po daily  not clear in chart review if or when pt stopped prednisone  pt did receive Solu-cortef 100 mg IV intraop  resume prednisone 20 mg po daily   stress dose Hydrocortisone 50 mg IV q 8 h x 3 doses    #Hypertension  cont amlodipine, hold lisinopril to prevent postop hypotension  pt is Hypertensive presently in PACU most likely from pain  monitor B/P may need to resume Lisinopril    # anxiety/depression   cont clonazepam, duloxetine    # morbid obesity/Pre-dm  A1C 6.2  dietary/lifestyle modifications    #CARINA, no h/o  STOP BANG score #4: intermediate risk  monitor o2 sats, supplement with O2 NC to maintain sats > 92%    #VTE prophylaxis  hold chemical prophylaxis until ok with surgery  venodynes BLE  mobilize as much as possible  CAPRINI 6     Thank you for the consult, will follow with you           
POST OPERATIVE DAY #:  2  STATUS POST:   Lami/PSF with instr T10-L5                   SUBJECTIVE: Patient seen and examined. Doing well. Leg pain improved since surgery. Patient tolerating OOB/ambulation    OBJECTIVE:     Vital Signs Last 24 Hrs  T(C): 36.8 (08 Sep 2024 08:00), Max: 37.2 (07 Sep 2024 16:00)  T(F): 98.2 (08 Sep 2024 08:00), Max: 98.9 (07 Sep 2024 16:00)  HR: 82 (08 Sep 2024 08:00) (82 - 97)  BP: 106/61 (08 Sep 2024 08:00) (106/61 - 139/67)  BP(mean): 69 (07 Sep 2024 20:00) (69 - 83)  RR: 18 (08 Sep 2024 08:00) (18 - 18)  SpO2: 94% (08 Sep 2024 08:00) (94% - 98%)    Parameters below as of 08 Sep 2024 08:00  Patient On (Oxygen Delivery Method): room air        Awake and alert  HEENT: unremarkable  Neck: supple  Back:          Dressing: clean/dry/intact             Drains: output per shift = 70cc  Bilateral Upper Extremities:         Good Motor Strength         Sensation intact  Bilateral Lower Extremities:          Good Motor Strength          Sensation Intact                   I&O's Detail    07 Sep 2024 07:01  -  08 Sep 2024 07:00  --------------------------------------------------------  IN:    Lactated Ringers: 1100 mL  Total IN: 1100 mL    OUT:    Bulb (mL): 280 mL    Indwelling Catheter - Urethral (mL): 1300 mL    Voided (mL): 1050 mL  Total OUT: 2630 mL    Total NET: -1530 mL          LABS:                        9.2    10.48 )-----------( 175      ( 08 Sep 2024 08:06 )             29.2     09-07    139  |  109<H>  |  14  ----------------------------<  153<H>  3.6   |  26  |  0.66    Ca    8.6      07 Sep 2024 07:57      A/P :  59y Female s/p   Lami/PSF with Instr T10-L5          POD # 2  -    Pain control  -    DVT ppx: SCDs    -    OOB as tolerated  -    Physical Therapy, re; ambulation  -    Continue hemovac  -    DC [facility]  -    Continue present treatment  -     eval for rehab placement next week.  
58 yo WB presents for definitive treatment of adjacent level HNP/DDDD/stenosis. Patient underwent lami/fusion L2-5 2 yrs ago. Patient noted onset of back pain and leg pain. Studies revealed large extruded HNP at L1/2 with stenosis and DDD. Patient failed nonoperative modalities and underwent exploration fusion, removal hardware L12-5, lami L1/2, exc HNP L1/2, PSF T10-L5 with SSI. LBG. BMP, allograft on 9/6/24.    9/9/24 patient feels well-no radicular pain.    PAST MEDICAL & SURGICAL HISTORY:  Lumbar stenosis      Spondylolisthesis  Lumbar      HTN (hypertension)      Peripheral edema      Rheumatoid arthritis      Anxiety and depression      Asthma      Lower back pain  with radiating pain to bilatral lower extremities      Osteoarthritis of both knees      Joint pain  Knees. left knee replaced.      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 Replaced after 9 months      S/P lumbar spinal fusion      MEDICATIONS  (STANDING):  amLODIPine   Tablet 10 milliGRAM(s) Oral daily  clonazePAM  Tablet 1 milliGRAM(s) Oral at bedtime  cyclobenzaprine 10 milliGRAM(s) Oral every 8 hours  DULoxetine 60 milliGRAM(s) Oral daily  gabapentin 600 milliGRAM(s) Oral three times a day  HYDROmorphone PCA (1 mG/mL) 30 milliLiter(s) PCA Continuous PCA Continuous  hydroxychloroquine 200 milliGRAM(s) Oral two times a day  lactated ringers. 1000 milliLiter(s) (100 mL/Hr) IV Continuous <Continuous>  polyethylene glycol 3350 17 Gram(s) Oral daily  predniSONE   Tablet 20 milliGRAM(s) Oral daily  senna 2 Tablet(s) Oral at bedtime  tranexamic acid IVPB 2000 milliGRAM(s) IV Intermittent once  tranexamic acid IVPB 2000 milliGRAM(s) IV Intermittent once    MEDICATIONS  (PRN):  acetaminophen     Tablet .. 650 milliGRAM(s) Oral every 6 hours PRN Temp greater or equal to 38.5C (101.3F), Mild Pain (1 - 3)  diazepam    Tablet 5 milliGRAM(s) Oral every 12 hours PRN muscle spasm  HYDROmorphone PCA (1 mG/mL) Rescue Clinician Bolus 0.5 milliGRAM(s) IV Push every 15 minutes PRN for Pain Scale GREATER THAN 6  morphine  - Injectable 4 milliGRAM(s) IV Push every 3 hours PRN Severe Pain (7 - 10)  naloxone Injectable 0.1 milliGRAM(s) IV Push every 3 minutes PRN For ANY of the following changes in patient status:  A. RR LESS THAN 10 breaths per minute, B. Oxygen saturation LESS THAN 90%, C. Sedation score of 6  ondansetron Injectable 4 milliGRAM(s) IV Push every 6 hours PRN Nausea and/or Vomiting  oxyCODONE    IR 10 milliGRAM(s) Oral every 4 hours PRN Moderate Pain (4 - 6)    Allergies    Levaquin (Hives; Flushing)    Intolerances    PE:    Vital Signs Last 24 Hrs  T(C): 37 (09 Sep 2024 08:18), Max: 37.2 (08 Sep 2024 20:00)  T(F): 98.6 (09 Sep 2024 08:18), Max: 99 (08 Sep 2024 20:00)  HR: 85 (09 Sep 2024 08:18) (84 - 100)  BP: 127/79 (09 Sep 2024 08:18) (103/59 - 127/79)  BP(mean): 87 (08 Sep 2024 20:00) (87 - 92)  RR: 16 (09 Sep 2024 08:18) (16 - 18)  SpO2: 95% (09 Sep 2024 08:18) (93% - 98%)    Parameters below as of 09 Sep 2024 08:18  Patient On (Oxygen Delivery Method): room air    Patient sitting upright in bed with mild incisional pain  Tolerating diet  Voiding adequately  BETITO with 60 cc last 24 hrs, 20 last 12 hrs  Dressing clean and dry  Neuro without deficits    LABS                        9.2    10.48 )-----------( 175      ( 08 Sep 2024 08:06 )             29.2

## 2024-09-11 LAB — SURGICAL PATHOLOGY STUDY: SIGNIFICANT CHANGE UP

## 2024-09-12 RX ORDER — AMLODIPINE BESYLATE 10 MG/1
1 TABLET ORAL
Qty: 30 | Refills: 0
Start: 2024-09-12 | End: 2024-10-11

## 2024-09-24 DIAGNOSIS — R73.03 PREDIABETES: ICD-10-CM

## 2024-09-24 DIAGNOSIS — G47.33 OBSTRUCTIVE SLEEP APNEA (ADULT) (PEDIATRIC): ICD-10-CM

## 2024-09-24 DIAGNOSIS — Z96.652 PRESENCE OF LEFT ARTIFICIAL KNEE JOINT: ICD-10-CM

## 2024-09-24 DIAGNOSIS — M43.16 SPONDYLOLISTHESIS, LUMBAR REGION: ICD-10-CM

## 2024-09-24 DIAGNOSIS — E66.01 MORBID (SEVERE) OBESITY DUE TO EXCESS CALORIES: ICD-10-CM

## 2024-09-24 DIAGNOSIS — M51.16 INTERVERTEBRAL DISC DISORDERS WITH RADICULOPATHY, LUMBAR REGION: ICD-10-CM

## 2024-09-24 DIAGNOSIS — Z79.52 LONG TERM (CURRENT) USE OF SYSTEMIC STEROIDS: ICD-10-CM

## 2024-09-24 DIAGNOSIS — M96.1 POSTLAMINECTOMY SYNDROME, NOT ELSEWHERE CLASSIFIED: ICD-10-CM

## 2024-09-24 DIAGNOSIS — J45.909 UNSPECIFIED ASTHMA, UNCOMPLICATED: ICD-10-CM

## 2024-09-24 DIAGNOSIS — I10 ESSENTIAL (PRIMARY) HYPERTENSION: ICD-10-CM

## 2024-09-24 DIAGNOSIS — Z96.0 PRESENCE OF UROGENITAL IMPLANTS: ICD-10-CM

## 2024-09-24 DIAGNOSIS — F41.8 OTHER SPECIFIED ANXIETY DISORDERS: ICD-10-CM

## 2024-09-24 DIAGNOSIS — M48.061 SPINAL STENOSIS, LUMBAR REGION WITHOUT NEUROGENIC CLAUDICATION: ICD-10-CM

## 2024-09-24 DIAGNOSIS — Z98.1 ARTHRODESIS STATUS: ICD-10-CM

## 2024-09-24 DIAGNOSIS — Z88.1 ALLERGY STATUS TO OTHER ANTIBIOTIC AGENTS: ICD-10-CM

## 2024-09-24 DIAGNOSIS — Z86.16 PERSONAL HISTORY OF COVID-19: ICD-10-CM

## 2024-09-24 DIAGNOSIS — Z90.710 ACQUIRED ABSENCE OF BOTH CERVIX AND UTERUS: ICD-10-CM

## 2024-09-24 DIAGNOSIS — M53.2X6 SPINAL INSTABILITIES, LUMBAR REGION: ICD-10-CM

## 2024-09-24 DIAGNOSIS — M06.9 RHEUMATOID ARTHRITIS, UNSPECIFIED: ICD-10-CM

## 2025-04-05 NOTE — PROGRESS NOTE ADULT - SUBJECTIVE AND OBJECTIVE BOX
C/c: poor wound healing.     HPI: 57F, PMH of HTN, anxiety, depression, asthma, lumbar stenosis, obesity, OA, RA, Prosthetic joint infn s/p eileen/spacer and revision,  presented to ED with poorly healing wound from recent lumbar lami/fusion performed on dec 2nd 2022. She was prescribed outpt abx without improvement.   Hospitalist service consulted for medical comanagement/clearance. She has been on prednisone 20mg daily for about a month prior to admission. Given periop steroids.  she underwent I+D 1/17.     pt seen and examined this am. [place] well. no sob/chest pain. Has some incisional pain. tolerating po. no difficulty voiding.     ROS: all 10 systems reviewed and is as above otherwise negative.     Vital Signs Last 24 Hrs  T(C): 36.4 (19 Jan 2023 08:53), Max: 36.8 (18 Jan 2023 20:26)  T(F): 97.5 (19 Jan 2023 08:53), Max: 98.2 (18 Jan 2023 20:26)  HR: 75 (19 Jan 2023 08:53) (75 - 85)  BP: 118/80 (19 Jan 2023 08:53) (118/80 - 140/71)  RR: 18 (19 Jan 2023 08:53) (18 - 18)  SpO2: 100% (19 Jan 2023 08:53) (97% - 100%)    Parameters below as of 19 Jan 2023 08:53  Patient On (Oxygen Delivery Method): room air        PHYSICAL EXAM:    GENERAL: Comfortable, no acute distress   HEAD:  Normocephalic, atraumatic  EYES: EOMI, PERRLA  HEENT: Moist mucous membranes  NECK: Supple, No JVD  NERVOUS SYSTEM:  Alert & Oriented X3, Motor Strength 5/5 B/L upper and lower extremities  CHEST/LUNG: Clear to auscultation bilaterally  HEART: Regular rate and rhythm  ABDOMEN: Soft, Nontender, Nondistended, Bowel sounds present  GENITOURINARY: Voiding, no palpable bladder  EXTREMITIES:   No clubbing, cyanosis, or edema  MUSCULOSKELTAL-back dressing +  SKIN-no rash  LABS:                        10.5   7.07  )-----------( 194      ( 18 Jan 2023 08:39 )             33.1     01-18    137  |  106  |  19  ----------------------------<  122<H>  3.9   |  25  |  0.50    Ca    8.8      18 Jan 2023       MEDICATIONS  (STANDING):  acetaminophen     Tablet .. 650 milliGRAM(s) Oral every 6 hours  amLODIPine   Tablet 10 milliGRAM(s) Oral daily  cefTRIAXone Injectable. 2000 milliGRAM(s) IV Push every 24 hours  cyclobenzaprine 10 milliGRAM(s) Oral every 8 hours  FLUoxetine 40 milliGRAM(s) Oral daily  gabapentin 600 milliGRAM(s) Oral three times a day  hydrocortisone sodium succinate Injectable 25 milliGRAM(s) IV Push every 8 hours  influenza   Vaccine 0.5 milliLiter(s) IntraMuscular once  lactated ringers. 1000 milliLiter(s) (75 mL/Hr) IV Continuous <Continuous>  lactated ringers. 1000 milliLiter(s) (100 mL/Hr) IV Continuous <Continuous>  lactobacillus acidophilus 1 Tablet(s) Oral daily  lisinopril 10 milliGRAM(s) Oral every 12 hours  montelukast 10 milliGRAM(s) Oral at bedtime  multivitamin 1 Tablet(s) Oral daily  senna 2 Tablet(s) Oral at bedtime  vancomycin  IVPB 1000 milliGRAM(s) IV Intermittent every 12 hours    MEDICATIONS  (PRN):  acetaminophen     Tablet .. 650 milliGRAM(s) Oral every 6 hours PRN Temp greater or equal to 38.5C (101.3F), Mild Pain (1 - 3)  albuterol    90 MICROgram(s) HFA Inhaler 2 Puff(s) Inhalation every 6 hours PRN for shortness of breath and/or wheezing  clonazePAM  Tablet 0.5 milliGRAM(s) Oral two times a day PRN anxiety  diazepam    Tablet 5 milliGRAM(s) Oral every 12 hours PRN muscle spasm  diphenhydrAMINE Injectable 12.5 milliGRAM(s) IV Push every 4 hours PRN Itching  HYDROmorphone  Injectable 0.5 milliGRAM(s) IV Push every 6 hours PRN Severe Pain (7 - 10)  morphine  - Injectable 4 milliGRAM(s) IV Push every 3 hours PRN Severe Pain (7 - 10)  oxyCODONE    IR 10 milliGRAM(s) Oral every 4 hours PRN Moderate Pain (4 - 6)  oxyCODONE    IR 5 milliGRAM(s) Oral every 4 hours PRN Mild Pain (1 - 3)  oxyCODONE    IR 10 milliGRAM(s) Oral every 4 hours PRN Moderate Pain (4 - 6)    ASSESSMENT AND PLAN:  57F, pmh as above a/w    1. Recent Lumbar lami/fusion complicated by post op infn:  -pt with h/o post op infns, likely related to underlying immunosuppression with RA/RA treatment.   -s/p I+D 1/7.   -f/u cultures  -monitor drain outpt.  -ID following.   -cultures with staph, await sensitivities.  -continue rocephin/vancomycin.   -physical therapy  -incentive spirometry.     2. Acute blood loss anemia:  -d/t surgery  -no need for transfusion at this time.     3. H/o RA:  -has been on prednisone for at least a month @ 20mg daily.   -s/p iv hydrocortisone 50mg on call to OR/then 25mg Q8H X 3.  -back on prednisone at home dose.    4. HTN:  -continue lisinopril.     5. Asthma:  -prn albuterol.   -singulair.     6. Anxiety/depression:  -continue fluoxetine, prn klonipin.     7. DVT px:  -per primary team.                  steady

## 2025-04-30 NOTE — ED PROVIDER NOTE - CLINICAL SUMMARY MEDICAL DECISION MAKING FREE TEXT BOX
pt with back pain and opening of surgical wound s/p laminectomy and fusion with serous drainage will get labs, esr, crp, blood cultures and pain medicaitons, tba to orthospine for possible infection, pt not septic, no concern for epidural abscess at this time will follow with ortho consult 30-Apr-2025 13:23

## 2025-07-01 NOTE — PROGRESS NOTE ADULT - SUBJECTIVE AND OBJECTIVE BOX
HPI  Patient is a 57y Female who presents to  ED w/ a c/o of 2 weeks of drainage from her surgical site s/p L2-5 lumbar lami/fusion with Dr Schaefer 12/2/22. Patient states she noticed drainage and now burning pain at her surigcal site , Denies Fevers/chills/CP/SOB/palpitations/N/v/Headache/confusion/dizziness/weakness/fatigue. States ability to walk. Denies any bowel/bladder incontinence. Endorses BUE paresthesia which are always present. Denies having any other pain elsewhere. No other orthopedic concerns at this time.    1/17/23 Patient underwent I&D of superficial wound infection-contained to extrafascial plane  1/18/23 Patient comfortable. IVAB continue-awaiting culture results  1/19/23 Patient comfortable-now on IV Vanco and Ceftriaxone    PMH/PSH:  Lumbar stenosis    Spondylolisthesis    HTN (hypertension)    Peripheral edema    Rheumatoid arthritis    Anxiety and depression    Asthma    Lower back pain    Osteoarthritis of both knees    Joint pain    Obesity    Seasonal allergies    Prolapse of female pelvic organs    Eczema    2019 novel coronavirus disease (COVID-19)    MEDICATIONS  (STANDING):  acetaminophen     Tablet .. 650 milliGRAM(s) Oral every 6 hours  amLODIPine   Tablet 10 milliGRAM(s) Oral daily  cefTRIAXone Injectable. 2000 milliGRAM(s) IV Push every 24 hours  cyclobenzaprine 10 milliGRAM(s) Oral every 8 hours  FLUoxetine 40 milliGRAM(s) Oral daily  gabapentin 600 milliGRAM(s) Oral three times a day  influenza   Vaccine 0.5 milliLiter(s) IntraMuscular once  lactated ringers. 1000 milliLiter(s) (75 mL/Hr) IV Continuous <Continuous>  lactated ringers. 1000 milliLiter(s) (100 mL/Hr) IV Continuous <Continuous>  lactobacillus acidophilus 1 Tablet(s) Oral daily  lisinopril 10 milliGRAM(s) Oral every 12 hours  montelukast 10 milliGRAM(s) Oral at bedtime  multivitamin 1 Tablet(s) Oral daily  predniSONE   Tablet 20 milliGRAM(s) Oral daily  senna 2 Tablet(s) Oral at bedtime  vancomycin  IVPB 1000 milliGRAM(s) IV Intermittent every 12 hours    MEDICATIONS  (PRN):  acetaminophen     Tablet .. 650 milliGRAM(s) Oral every 6 hours PRN Temp greater or equal to 38.5C (101.3F), Mild Pain (1 - 3)  albuterol    90 MICROgram(s) HFA Inhaler 2 Puff(s) Inhalation every 6 hours PRN for shortness of breath and/or wheezing  clonazePAM  Tablet 0.5 milliGRAM(s) Oral two times a day PRN anxiety  diazepam    Tablet 5 milliGRAM(s) Oral every 12 hours PRN muscle spasm  diphenhydrAMINE Injectable 12.5 milliGRAM(s) IV Push every 4 hours PRN Itching  HYDROmorphone  Injectable 0.5 milliGRAM(s) IV Push every 6 hours PRN Severe Pain (7 - 10)  morphine  - Injectable 4 milliGRAM(s) IV Push every 3 hours PRN Severe Pain (7 - 10)  oxyCODONE    IR 10 milliGRAM(s) Oral every 4 hours PRN Moderate Pain (4 - 6)  oxyCODONE    IR 5 milliGRAM(s) Oral every 4 hours PRN Mild Pain (1 - 3)  oxyCODONE    IR 10 milliGRAM(s) Oral every 4 hours PRN Moderate Pain (4 - 6)    Allergies    Levaquin (Hives; Flushing)      PHYSICAL EXAM:    Vital Signs Last 24 Hrs  T(C): 36.4 (19 Jan 2023 08:53), Max: 36.8 (18 Jan 2023 20:26)  T(F): 97.5 (19 Jan 2023 08:53), Max: 98.2 (18 Jan 2023 20:26)  HR: 75 (19 Jan 2023 08:53) (75 - 85)  BP: 118/80 (19 Jan 2023 08:53) (118/80 - 140/71)  BP(mean): --  RR: 18 (19 Jan 2023 08:53) (18 - 18)  SpO2: 100% (19 Jan 2023 08:53) (97% - 100%)    Parameters below as of 19 Jan 2023 08:53  Patient On (Oxygen Delivery Method): room air    Patient sitting upright on side of bed  Tolerating diet  Voiding  Dressing changed-no active bleeding or drainage  BETITO with minimal drainage-left in today  Neuro without deficits    LABS                                   10.5   7.07  )-----------( 194      ( 18 Jan 2023 08:39 )             33.1     01-18    137  |  106  |  19  ----------------------------<  122<H>  3.9   |  25  |  0.50    Ca    8.8      18 Jan 2023 08:39    Culture Results:   Moderate Staphylococcus aureus See previous culture [id number] (01.17.23 @ 15:15)                 Detail Level: Simple Detail Level: Zone Bleaching Agents Recommendations: Revitalize pads BID, SPF QD, Eventone for anti-aging and sunspots

## 2025-07-14 NOTE — PHYSICAL THERAPY INITIAL EVALUATION ADULT - THERAPY FREQUENCY, PT EVAL
Detail Level: Simple Instructions: This plan will send the code FBSE to the PM system.  DO NOT or CHANGE the price. Price (Do Not Change): 0.00 daily